# Patient Record
Sex: MALE | Race: BLACK OR AFRICAN AMERICAN | Employment: OTHER | URBAN - METROPOLITAN AREA
[De-identification: names, ages, dates, MRNs, and addresses within clinical notes are randomized per-mention and may not be internally consistent; named-entity substitution may affect disease eponyms.]

---

## 2017-01-04 ENCOUNTER — OFFICE VISIT (OUTPATIENT)
Dept: INTERNAL MEDICINE CLINIC | Age: 82
End: 2017-01-04

## 2017-01-04 VITALS
WEIGHT: 192 LBS | DIASTOLIC BLOOD PRESSURE: 80 MMHG | HEART RATE: 81 BPM | SYSTOLIC BLOOD PRESSURE: 148 MMHG | RESPIRATION RATE: 20 BRPM | TEMPERATURE: 98.4 F | HEIGHT: 70 IN | OXYGEN SATURATION: 97 % | BODY MASS INDEX: 27.49 KG/M2

## 2017-01-04 DIAGNOSIS — R80.9 CONTROLLED TYPE 2 DIABETES MELLITUS WITH MICROALBUMINURIA, WITHOUT LONG-TERM CURRENT USE OF INSULIN (HCC): Primary | ICD-10-CM

## 2017-01-04 DIAGNOSIS — E78.00 HIGH BLOOD CHOLESTEROL LEVEL: ICD-10-CM

## 2017-01-04 DIAGNOSIS — I10 HTN (HYPERTENSION), BENIGN: ICD-10-CM

## 2017-01-04 DIAGNOSIS — E11.29 CONTROLLED TYPE 2 DIABETES MELLITUS WITH MICROALBUMINURIA, WITHOUT LONG-TERM CURRENT USE OF INSULIN (HCC): Primary | ICD-10-CM

## 2017-01-04 RX ORDER — AMLODIPINE AND BENAZEPRIL HYDROCHLORIDE 10; 20 MG/1; MG/1
CAPSULE ORAL
Qty: 30 CAP | Refills: 5 | Status: SHIPPED | OUTPATIENT
Start: 2017-01-04 | End: 2018-01-01 | Stop reason: ALTCHOICE

## 2017-01-04 NOTE — PROGRESS NOTES
Patient is in the office today for a 4 month follow up. Do you have an Advance Directive yes      1. Have you been to the ER, urgent care clinic since your last visit? Hospitalized since your last visit? No    2. Have you seen or consulted any other health care providers outside of the [address] [place] [address] since your last visit? Include any pap smears or colon screening.  No

## 2017-01-04 NOTE — PROGRESS NOTES
Subjective:       Chief Complaint  The patient presents for follow up of diabetes, hypertension and high cholesterol. HPI Merilee Goodpasture is a [de-identified] y.o. male seen for follow up of diabetes. Too has hypertension and hyperlipidemia. Diabetes well controlled, no significant medication side effects noted, on metformin, hypertension well controlled, no significant medication side effects noted, on Lotrel, hyperlipidemia well controlled, no significant medication side effects noted, on Zocor. Diet and Lifestyle: generally follows a low fat low cholesterol diet, follows a diabetic diet regularly, sedentary    Home BP Monitoring: is not measured at home. Diabetic Review of Systems - home glucose monitoring: is performed regularly, 1x/day. Other symptoms and concerns:  Pt is by himself today. Has been concern by family about his memory but pt seems to still drive and take meds. Discussed the patient's BMI with him. The BMI follow up plan is as follows: BMI is out of normal parameters and plan is as follows: I have counseled this patient on diet and exercise regimens. Current Outpatient Prescriptions   Medication Sig    amLODIPine-benazepril (LOTREL) 10-20 mg per capsule take 1 capsule by mouth once daily    metFORMIN ER (GLUCOPHAGE XR) 500 mg tablet take 1 tablet by mouth once daily WITH DINNER    simvastatin (ZOCOR) 40 mg tablet take 1 tablet by mouth EVERYNIGHT AT BEDTIME    aspirin (ASPIRIN) 325 mg tablet Take 325 mg by mouth daily. No current facility-administered medications for this visit.               Review of Systems  Respiratory: negative for dyspnea on exertion  Cardiovascular: negative for chest pain    Objective:     Visit Vitals    /80 (BP 1 Location: Left arm, BP Patient Position: Sitting)    Pulse 81    Temp 98.4 °F (36.9 °C) (Oral)    Resp 20    Ht 5' 10\" (1.778 m)    Wt 192 lb (87.1 kg)    SpO2 97%    BMI 27.55 kg/m2        General appearance - alert, well appearing, and in no distress  Neck - supple, no significant adenopathy, carotids upstroke normal bilaterally, no bruits  Chest - clear to auscultation, no wheezes, rales or rhonchi, symmetric air entry  Heart - normal rate, regular rhythm, normal S1, S2, no murmurs, rubs, clicks or gallops  Extremities - peripheral pulses normal, no pedal edema, no clubbing or cyanosis        Labs:   Lab Results   Component Value Date/Time    Hemoglobin A1c 5.6 12/29/2016 08:16 AM    Hemoglobin A1c 5.8 08/18/2016 08:30 AM    Hemoglobin A1c 6.3 04/19/2016 08:11 AM    Microalbumin/Creat ratio (mg/g creat) 294 12/29/2016 08:16 AM    Microalb/Creat ratio (ug/mg creat.) 140.7 12/18/2014 08:32 AM    Microalbumin,urine random 60.30 12/29/2016 08:16 AM    LDL, calculated 69 12/29/2016 08:16 AM    Creatinine 1.17 12/29/2016 08:16 AM      Lab Results   Component Value Date/Time    Cholesterol, total 121 12/29/2016 08:16 AM    HDL Cholesterol 36 12/29/2016 08:16 AM    LDL, calculated 69 12/29/2016 08:16 AM    Triglyceride 80 12/29/2016 08:16 AM    CHOL/HDL Ratio 3.4 12/29/2016 08:16 AM     Lab Results   Component Value Date/Time    AST 12 12/29/2016 08:16 AM    Alk. phosphatase 88 12/29/2016 08:16 AM     Lab Results   Component Value Date/Time    GFR est AA >60 12/29/2016 08:16 AM    GFR est non-AA 60 12/29/2016 08:16 AM    Creatinine 1.17 12/29/2016 08:16 AM    BUN 17 12/29/2016 08:16 AM    Sodium 143 12/29/2016 08:16 AM    Potassium 4.2 12/29/2016 08:16 AM    Chloride 107 12/29/2016 08:16 AM    CO2 29 12/29/2016 08:16 AM            Assessment / Plan     Diabetes well controlled, on current meds,   Hypertension well controlled, on current meds  Hyperlipidemia well controlled, on ZOcor. ICD-10-CM ICD-9-CM    1.  Controlled type 2 diabetes mellitus with microalbuminuria, without long-term current use of insulin (HCC) E11.29 250.40 HEMOGLOBIN A1C W/O EAG    R80.9 791.0    2. HTN (hypertension), benign S41 974.6 METABOLIC PANEL, COMPREHENSIVE   3. High blood cholesterol level E78.00 272.0 LIPID PANEL                Diabetic issues reviewed with him: diabetic diet discussed in detail, and low cholesterol diet, weight control and daily exercise discussed. Follow-up Disposition:  Return in about 4 months (around 5/4/2017) for labs 1 week before. Reviewed plan of care. Patient has provided input and agrees with goals.

## 2017-01-04 NOTE — PATIENT INSTRUCTIONS

## 2017-01-04 NOTE — MR AVS SNAPSHOT
Visit Information Date & Time Provider Department Dept. Phone Encounter #  
 1/4/2017  4:30 PM Dorys Sims MD [facility] 8 [id number] Follow-up Instructions Return in about 4 months (around 5/4/2017) for labs 1 week before. Your Appointments 1/4/2017  4:30 PM  
ROUTINE CARE with Dorys Sims MD  
[facility] [employer] (--) Appt Note: 4 month follow with labs [address] [place] [address] [phone]  
  
   
 [address] [place] [address] [address] Upcoming Health Maintenance Date Due DTaP/Tdap/Td series (1 - Tdap) 8/18/1956 ZOSTER VACCINE AGE 60> 8/18/1996 GLAUCOMA SCREENING Q2Y 8/18/2001 EYE EXAM RETINAL OR DILATED Q1 8/23/2010 FOOT EXAM Q1 4/24/2016 INFLUENZA AGE 9 TO ADULT 8/1/2016 Pneumococcal 65+ High/Highest Risk (1 of 2 - PCV13) 1/30/2017* MEDICARE YEARLY EXAM 4/28/2017 HEMOGLOBIN A1C Q6M 6/29/2017 MICROALBUMIN Q1 12/29/2017 LIPID PANEL Q1 12/29/2017 *Topic was postponed. The date shown is not the original due date. Allergies as of 1/4/2017  Review Complete On: 1/4/2017 By: Dorys Sims MD  
 No Known Allergies Current Immunizations  Never Reviewed No immunizations on file. Not reviewed this visit You Were Diagnosed With   
  
 Codes Comments HTN (hypertension), benign    -  Primary ICD-10-CM: I10 
ICD-9-CM: 401.1 High blood cholesterol level     ICD-10-CM: E78.00 ICD-9-CM: 272.0 Controlled type 2 diabetes mellitus with microalbuminuria, without long-term current use of insulin (HCC)     ICD-10-CM: E11.29, R80.9 ICD-9-CM: 250.40, 791.0 Vitals BP Pulse Temp Resp Height(growth percentile) Weight(growth percentile) 148/80 (BP 1 Location: Left arm, BP Patient Position: Sitting) 81 98.4 °F (36.9 °C) (Oral) 20 5' 10\" (1.778 m) 192 lb (87.1 kg) SpO2 BMI Smoking Status 97% 27.55 kg/m2 Never Smoker Vitals History BMI and BSA Data Body Mass Index Body Surface Area  
 27.55 kg/m 2 2.07 m 2 [phone] Your Updated Medication List  
  
   
This list is accurate as of: 1/4/17  4:20 PM.  Always use your most recent med list. amLODIPine-benazepril 10-20 mg per capsule Commonly known as:  LOTREL  
take 1 capsule by mouth once daily  
  
 aspirin 325 mg tablet Commonly known as:  ASPIRIN Take 325 mg by mouth daily. metFORMIN  mg tablet Commonly known as:  GLUCOPHAGE XR  
take 1 tablet by mouth once daily WITH DINNER  
  
 simvastatin 40 mg tablet Commonly known as:  ZOCOR  
take 1 tablet by mouth EVERYNIGHT AT BEDTIME Prescriptions Sent to Pharmacy Refills  
 amLODIPine-benazepril (LOTREL) 10-20 mg per capsule 5 Sig: take 1 capsule by mouth once daily Class: Normal  
 Pharmacy: [id number]. Sergio [place]. Ph #: [phone] Follow-up Instructions Return in about 4 months (around 5/4/2017) for labs 1 week before. Patient Instructions Learning About Diabetes Food Guidelines Your Care Instructions Meal planning is important to manage diabetes. It helps keep your blood sugar at a target level (which you set with your doctor). You don't have to eat special foods. You can eat what your family eats, including sweets once in a while. But you do have to pay attention to how often you eat and how much you eat of certain foods. You may want to work with a dietitian or a certified diabetes educator (CDE) to help you plan meals and snacks. A dietitian or CDE can also help you lose weight if that is one of your goals. What should you know about eating carbs?  
Managing the amount of carbohydrate (carbs) you eat is an important part of healthy meals when you have diabetes. Carbohydrate is found in many foods. · Learn which foods have carbs. And learn the amounts of carbs in different foods. ¨ Bread, cereal, pasta, and rice have about 15 grams of carbs in a serving. A serving is 1 slice of bread (1 ounce), ½ cup of cooked cereal, or 1/3 cup of cooked pasta or rice. ¨ Fruits have 15 grams of carbs in a serving. A serving is 1 small fresh fruit, such as an apple or orange; ½ of a banana; ½ cup of cooked or canned fruit; ½ cup of fruit juice; 1 cup of melon or raspberries; or 2 tablespoons of dried fruit. ¨ Milk and no-sugar-added yogurt have 15 grams of carbs in a serving. A serving is 1 cup of milk or 2/3 cup of no-sugar-added yogurt. ¨ Starchy vegetables have 15 grams of carbs in a serving. A serving is ½ cup of mashed potatoes or sweet potato; 1 cup winter squash; ½ of a small baked potato; ½ cup of cooked beans; or ½ cup cooked corn or green peas. · Learn how much carbs to eat each day and at each meal. A dietitian or CDE can teach you how to keep track of the amount of carbs you eat. This is called carbohydrate counting. · If you are not sure how to count carbohydrate grams, use the Plate Method to plan meals. It is a good, quick way to make sure that you have a balanced meal. It also helps you spread carbs throughout the day. ¨ Divide your plate by types of foods. Put non-starchy vegetables on half the plate, meat or other protein food on one-quarter of the plate, and a grain or starchy vegetable in the final quarter of the plate. To this you can add a small piece of fruit and 1 cup of milk or yogurt, depending on how many carbs you are supposed to eat at a meal. 
· Try to eat about the same amount of carbs at each meal. Do not \"save up\" your daily allowance of carbs to eat at one meal. 
· Proteins have very little or no carbs per serving.  Examples of proteins are beef, chicken, turkey, fish, eggs, tofu, cheese, cottage cheese, and peanut butter. A serving size of meat is 3 ounces, which is about the size of a deck of cards. Examples of meat substitute serving sizes (equal to 1 ounce of meat) are 1/4 cup of cottage cheese, 1 egg, 1 tablespoon of peanut butter, and ½ cup of tofu. How can you eat out and still eat healthy? · Learn to estimate the serving sizes of foods that have carbohydrate. If you measure food at home, it will be easier to estimate the amount in a serving of restaurant food. · If the meal you order has too much carbohydrate (such as potatoes, corn, or baked beans), ask to have a low-carbohydrate food instead. Ask for a salad or green vegetables. · If you use insulin, check your blood sugar before and after eating out to help you plan how much to eat in the future. · If you eat more carbohydrate at a meal than you had planned, take a walk or do other exercise. This will help lower your blood sugar. What else should you know? · Limit saturated fat, such as the fat from meat and dairy products. This is a healthy choice because people who have diabetes are at higher risk of heart disease. So choose lean cuts of meat and nonfat or low-fat dairy products. Use olive or canola oil instead of butter or shortening when cooking. · Don't skip meals. Your blood sugar may drop too low if you skip meals and take insulin or certain medicines for diabetes. · Check with your doctor before you drink alcohol. Alcohol can cause your blood sugar to drop too low. Alcohol can also cause a bad reaction if you take certain diabetes medicines. Follow-up care is a key part of your treatment and safety. Be sure to make and go to all appointments, and call your doctor if you are having problems. It's also a good idea to know your test results and keep a list of the medicines you take. Where can you learn more? Go to http://gladis-bigg.info/.  
Enter A080 in the search box to learn more about \"Learning About Diabetes Food Guidelines. \" Current as of: May 23, 2016 Content Version: 11.1 © 3272-7684 [employer]. Care instructions adapted under license by [employer] (which disclaims liability or warranty for this information). If you have questions about a medical condition or this instruction, always ask your healthcare professional. [address] any warranty or liability for your use of this information. Introducing [facility]! [employer] introduces [employer] patient portal. Now you can access parts of your medical record, email your doctor's office, and request medication refills online. 1. In your internet browser, go to [email or web address] 2. Click on the First Time User? Click Here link in the Sign In box. You will see the New Member Sign Up page. 3. Enter your [employer] Access Code exactly as it appears below. You will not need to use this code after youve completed the sign-up process. If you do not sign up before the expiration date, you must request a new code. · [employer] Access Code: [email or web address] Expires: 4/4/2017  4:14 PM 
 
4. Enter the last four digits of your Social Security Number (xxxx) and Date of Birth (mm/dd/yyyy) as indicated and click Submit. You will be taken to the next sign-up page. 5. Create a [employer] ID. This will be your [employer] login ID and cannot be changed, so think of one that is secure and easy to remember. 6. Create a [employer] password. You can change your password at any time. 7. Enter your Password Reset Question and Answer. This can be used at a later time if you forget your password. 8. Enter your e-mail address. You will receive e-mail notification when new information is available in [address] 9. Click Sign Up. You can now view and download portions of your medical record. 10. Click the Download Summary menu link to download a portable copy of your medical information. If you have questions, please visit the Frequently Asked Questions section of the [facility] website. Remember, [employer] is NOT to be used for urgent needs. For medical emergencies, dial 911. Now available from your iPhone and Android! Please provide this summary of care documentation to your next provider. Your primary care clinician is listed as JOSE GUEVARA. If you have any questions after today's visit, please call [phone].

## 2017-01-23 RX ORDER — METFORMIN HYDROCHLORIDE 500 MG/1
TABLET, EXTENDED RELEASE ORAL
Qty: 30 TAB | Refills: 5 | Status: SHIPPED | OUTPATIENT
Start: 2017-01-23 | End: 2018-01-01

## 2017-04-28 ENCOUNTER — HOSPITAL ENCOUNTER (OUTPATIENT)
Dept: LAB | Age: 82
Discharge: HOME | End: 2017-04-28
Payer: MEDICARE

## 2017-04-28 DIAGNOSIS — E11.29 CONTROLLED TYPE 2 DIABETES MELLITUS WITH MICROALBUMINURIA, WITHOUT LONG-TERM CURRENT USE OF INSULIN (HCC): ICD-10-CM

## 2017-04-28 DIAGNOSIS — R80.9 CONTROLLED TYPE 2 DIABETES MELLITUS WITH MICROALBUMINURIA, WITHOUT LONG-TERM CURRENT USE OF INSULIN (HCC): ICD-10-CM

## 2017-04-28 DIAGNOSIS — E78.00 HIGH BLOOD CHOLESTEROL LEVEL: ICD-10-CM

## 2017-04-28 DIAGNOSIS — I10 HTN (HYPERTENSION), BENIGN: ICD-10-CM

## 2017-04-28 LAB
ALBUMIN SERPL BCP-MCNC: 3.3 G/DL (ref 3.4–5)
ALBUMIN/GLOB SERPL: 0.6 {RATIO} (ref 0.8–1.7)
ALP SERPL-CCNC: 78 U/L (ref 45–117)
ALT SERPL-CCNC: 15 U/L (ref 16–61)
ANION GAP BLD CALC-SCNC: 11 MMOL/L (ref 3–18)
AST SERPL W P-5'-P-CCNC: 15 U/L (ref 15–37)
BILIRUB SERPL-MCNC: 0.4 MG/DL (ref 0.2–1)
BUN SERPL-MCNC: 12 MG/DL (ref 7–18)
BUN/CREAT SERPL: 11 (ref 12–20)
CALCIUM SERPL-MCNC: 9.3 MG/DL (ref 8.5–10.1)
CHLORIDE SERPL-SCNC: 103 MMOL/L (ref 100–108)
CHOLEST SERPL-MCNC: 107 MG/DL
CO2 SERPL-SCNC: 27 MMOL/L (ref 21–32)
CREAT SERPL-MCNC: 1.08 MG/DL (ref 0.6–1.3)
GLOBULIN SER CALC-MCNC: 5.6 G/DL (ref 2–4)
GLUCOSE SERPL-MCNC: 94 MG/DL (ref 74–99)
HBA1C MFR BLD: 6 % (ref 4.2–5.6)
HDLC SERPL-MCNC: 30 MG/DL (ref 40–60)
HDLC SERPL: 3.6 {RATIO} (ref 0–5)
LDLC SERPL CALC-MCNC: 56.4 MG/DL (ref 0–100)
LIPID PROFILE,FLP: ABNORMAL
POTASSIUM SERPL-SCNC: 4.3 MMOL/L (ref 3.5–5.5)
PROT SERPL-MCNC: 8.9 G/DL (ref 6.4–8.2)
SODIUM SERPL-SCNC: 141 MMOL/L (ref 136–145)
TRIGL SERPL-MCNC: 103 MG/DL (ref ?–150)
VLDLC SERPL CALC-MCNC: 20.6 MG/DL

## 2017-04-28 PROCEDURE — 80053 COMPREHEN METABOLIC PANEL: CPT | Performed by: INTERNAL MEDICINE

## 2017-04-28 PROCEDURE — 80061 LIPID PANEL: CPT | Performed by: INTERNAL MEDICINE

## 2017-04-28 PROCEDURE — 36415 COLL VENOUS BLD VENIPUNCTURE: CPT | Performed by: INTERNAL MEDICINE

## 2017-04-28 PROCEDURE — 83036 HEMOGLOBIN GLYCOSYLATED A1C: CPT | Performed by: INTERNAL MEDICINE

## 2017-05-15 ENCOUNTER — OFFICE VISIT (OUTPATIENT)
Dept: INTERNAL MEDICINE CLINIC | Age: 82
End: 2017-05-15

## 2017-05-15 VITALS
OXYGEN SATURATION: 98 % | SYSTOLIC BLOOD PRESSURE: 134 MMHG | RESPIRATION RATE: 16 BRPM | TEMPERATURE: 98.1 F | HEART RATE: 71 BPM | HEIGHT: 70 IN | WEIGHT: 181 LBS | BODY MASS INDEX: 25.91 KG/M2 | DIASTOLIC BLOOD PRESSURE: 80 MMHG

## 2017-05-15 DIAGNOSIS — E11.29 CONTROLLED TYPE 2 DIABETES MELLITUS WITH MICROALBUMINURIA, WITHOUT LONG-TERM CURRENT USE OF INSULIN (HCC): ICD-10-CM

## 2017-05-15 DIAGNOSIS — I10 HTN (HYPERTENSION), BENIGN: ICD-10-CM

## 2017-05-15 DIAGNOSIS — Z13.39 SCREENING FOR ALCOHOLISM: ICD-10-CM

## 2017-05-15 DIAGNOSIS — R63.4 WEIGHT LOSS: ICD-10-CM

## 2017-05-15 DIAGNOSIS — Z00.00 ROUTINE GENERAL MEDICAL EXAMINATION AT A HEALTH CARE FACILITY: Primary | ICD-10-CM

## 2017-05-15 DIAGNOSIS — R80.9 CONTROLLED TYPE 2 DIABETES MELLITUS WITH MICROALBUMINURIA, WITHOUT LONG-TERM CURRENT USE OF INSULIN (HCC): ICD-10-CM

## 2017-05-15 DIAGNOSIS — E78.00 HIGH BLOOD CHOLESTEROL LEVEL: ICD-10-CM

## 2017-05-15 NOTE — MR AVS SNAPSHOT
Visit Information Date & Time Provider Department Dept. Phone Encounter #  
 5/15/2017  3:15 PM Yari Felder MD [facility] 8 [id number] Follow-up Instructions Return in about 3 months (around 8/15/2017) for labs 1 week before. Upcoming Health Maintenance Date Due DTaP/Tdap/Td series (1 - Tdap) 8/18/1956 ZOSTER VACCINE AGE 60> 8/18/1996 GLAUCOMA SCREENING Q2Y 8/18/2001 Pneumococcal 65+ Low/Medium Risk (1 of 2 - PCV13) 8/18/2001 EYE EXAM RETINAL OR DILATED Q1 8/23/2010 FOOT EXAM Q1 4/24/2016 MEDICARE YEARLY EXAM 4/28/2017 INFLUENZA AGE 9 TO ADULT 8/1/2017 HEMOGLOBIN A1C Q6M 10/28/2017 MICROALBUMIN Q1 12/29/2017 LIPID PANEL Q1 4/28/2018 Allergies as of 5/15/2017  Review Complete On: 5/15/2017 By: Yari Felder MD  
 No Known Allergies Current Immunizations  Never Reviewed No immunizations on file. Not reviewed this visit You Were Diagnosed With   
  
 Codes Comments Routine general medical examination at a health care facility     ICD-10-CM: Z00.00 ICD-9-CM: V70.0 Screening for alcoholism     ICD-10-CM: Z13.89 ICD-9-CM: V79.1 Vitals BP Pulse Temp Resp Height(growth percentile) Weight(growth percentile) 134/80 (BP 1 Location: Left arm, BP Patient Position: Sitting) 71 98.1 °F (36.7 °C) (Oral) 16 5' 10\" (1.778 m) 181 lb (82.1 kg) SpO2 BMI Smoking Status 98% 25.97 kg/m2 Never Smoker Vitals History BMI and BSA Data Body Mass Index Body Surface Area  
 25.97 kg/m 2 2.01 m 2 Preferred Pharmacy Pharmacy Name Phone [facility]. [place]a Croft, [address] [facility] [phone] Your Updated Medication List  
  
   
This list is accurate as of: 5/15/17  4:28 PM.  Always use your most recent med list. amLODIPine-benazepril 10-20 mg per capsule Commonly known as:  LOTREL  
take 1 capsule by mouth once daily aspirin 325 mg tablet Commonly known as:  ASPIRIN Take 325 mg by mouth daily. metFORMIN  mg tablet Commonly known as:  GLUCOPHAGE XR  
take 1 tablet by mouth once daily WITH DINNER  
  
 simvastatin 40 mg tablet Commonly known as:  ZOCOR  
take 1 tablet by mouth EVERYNIGHT AT BEDTIME Follow-up Instructions Return in about 3 months (around 8/15/2017) for labs 1 week before. Patient Instructions Medicare Part B Preventive Services Limitations Recommendation Scheduled Bone Mass Measurement 
(age 72 & older, biennial) Requires diagnosis related to osteoporosis or estrogen deficiency. Biennial benefit unless patient has history of long-term glucocorticoid tx or baseline is needed because initial test was by other method  NA Cardiovascular Screening Blood Tests (every 5 years) Total cholesterol, HDL, Triglycerides Order as a panel if possible  4/2017 Colorectal Cancer Screening 
-Fecal occult blood test (annual) -Flexible sigmoidoscopy (5y) 
-Screening colonoscopy (10y) -Barium Enema   NA Counseling to Prevent Tobacco Use (up to 8 sessions per year) - Counseling greater than 3 and up to 10 minutes - Counseling greater than 10 minutes Patients must be asymptomatic of tobacco-related conditions to receive as preventive service  NA Diabetes Screening Tests (at least every 3 years, Medicare covers annually or at 6-month intervals for prediabetic patients) Fasting blood sugar (FBS) or glucose tolerance test (GTT) Patient must be diagnosed with one of the following: 
-Hypertension, Dyslipidemia, obesity, previous impaired FBS or GTT 
Or any two of the following: overweight, FH of diabetes, age ? 72, history of gestational diabetes, birth of baby weighing more than 9 pounds  4/2017 Diabetes Self-Management Training (DSMT) (no USPSTF recommendation) Requires referral by treating physician for patient with diabetes or renal disease. 10 hours of initial DSMT session of no less than 30 minutes each in a continuous 12-month period. 2 hours of follow-up DSMT in subsequent years. 8/2015 Glaucoma Screening (no USPSTF recommendation) Diabetes mellitus, family history, , age 48 or over,  American, age 72 or over  NA Human Immunodeficiency Virus (HIV) Screening (annually for increased risk patients) HIV-1 and HIV-2 by EIA, NORM, rapid antibody test, or oral mucosa transudate Patient must be at increased risk for HIV infection per USPSTF guidelines or pregnant. Tests covered annually for patients at increased risk. Pregnant patients may receive up to 3 test during pregnancy. NA Medical Nutrition Therapy (MNT) (for diabetes or renal disease not recommended schedule) Requires referral by treating physician for patient with diabetes or renal disease. Can be provided in same year as diabetes self-management training (DSMT), and CMS recommends medical nutrition therapy take place after DSMT. Up to 3 hours for initial year and 2 hours in subsequent years. NA Prostate Cancer Screening (annually up to age 76) - Digital rectal exam (ROBYN) - Prostate specific antigen (PSA) Annually (age 48 or over), ROBYN not paid separately when covered E/M service is provided on same date Men up to age 76 may need a screening blood test for prostate cancer at certain intervals, depending on their personal and family history. This decision is between the patient and his provider. NA Seasonal Influenza Vaccination (annually)   Declined Pneumococcal Vaccination (once after 65)   Declined Hepatitis B Vaccinations (if medium/high risk) Medium/high risk factors:  End-stage renal disease, Hemophiliacs who received Factor VIII or IX concentrates, Clients of institutions for the mentally retarded, Persons who live in the same house as a HepB virus carrier, Homosexual men, Illicit injectable drug abusers.   NA  
 Shingles Vaccination A shingles vaccine is also recommended once in a lifetime after age 61  Declined Ultrasound Screening for Abdominal Aortic Aneurysm (AAA) (once) Patient must be referred through [facility] and not have had a screening for abdominal aortic aneurysm before under Medicare. Limited to patients who meet one of the following criteria: 
- Men who are 73-68 years old and have smoked more than 100 cigarettes in their lifetime. 
-Anyone with a FH of AAA 
-Anyone recommended for screening by USPSTF  Na Preventing Falls: Care Instructions Your Care Instructions Getting around your home safely can be a challenge if you have injuries or health problems that make it easy for you to fall. Loose rugs and furniture in walkways are among the dangers for many older people who have problems walking or who have poor eyesight. People who have conditions such as arthritis, osteoporosis, or dementia also have to be careful not to fall. You can make your home safer with a few simple measures. Follow-up care is a key part of your treatment and safety. Be sure to make and go to all appointments, and call your doctor if you are having problems. It's also a good idea to know your test results and keep a list of the medicines you take. How can you care for yourself at home? Taking care of yourself · You may get dizzy if you do not drink enough water. To prevent dehydration, drink plenty of fluids, enough so that your urine is light yellow or clear like water. Choose water and other caffeine-free clear liquids. If you have kidney, heart, or liver disease and have to limit fluids, talk with your doctor before you increase the amount of fluids you drink. · Exercise regularly to improve your strength, muscle tone, and balance. Walk if you can. Swimming may be a good choice if you cannot walk easily.  
· Have your vision and hearing checked each year or any time you notice a change. If you have trouble seeing and hearing, you might not be able to avoid objects and could lose your balance. · Know the side effects of the medicines you take. Ask your doctor or pharmacist whether the medicines you take can affect your balance. Sleeping pills or sedatives can affect your balance. · Limit the amount of alcohol you drink. Alcohol can impair your balance and other senses. · Ask your doctor whether calluses or corns on your feet need to be removed. If you wear loose-fitting shoes because of calluses or corns, you can lose your balance and fall. · Talk to your doctor if you have numbness in your feet. Preventing falls at home · Remove raised doorway thresholds, throw rugs, and clutter. Repair loose carpet or raised areas in the floor. · Move furniture and electrical cords to keep them out of walking paths. · Use nonskid floor wax, and wipe up spills right away, especially on ceramic tile floors. · If you use a walker or cane, put rubber tips on it. If you use crutches, clean the bottoms of them regularly with an abrasive pad, such as steel wool. · Keep your house well lit, especially Katcarina Connelly, and outside walkways. Use night-lights in areas such as hallways and bathrooms. Add extra light switches or use remote switches (such as switches that go on or off when you clap your hands) to make it easier to turn lights on if you have to get up during the night. · Install sturdy handrails on stairways. · Move items in your cabinets so that the things you use a lot are on the lower shelves (about waist level). · Keep a cordless phone and a flashlight with new batteries by your bed. If possible, put a phone in each of the main rooms of your house, or carry a cell phone in case you fall and cannot reach a phone. Or, you can wear a device around your neck or wrist. You push a button that sends a signal for help. · Wear low-heeled shoes that fit well and give your feet good support. Use footwear with nonskid soles. Check the heels and soles of your shoes for wear. Repair or replace worn heels or soles. · Do not wear socks without shoes on wood floors. · Walk on the grass when the sidewalks are slippery. If you live in an area that gets snow and ice in the winter, sprinkle salt on slippery steps and sidewalks. Preventing falls in the bath · Install grab bars and nonskid mats inside and outside your shower or tub and near the toilet and sinks. · Use shower chairs and bath benches. · Use a hand-held shower head that will allow you to sit while showering. · Get into a tub or shower by putting the weaker leg in first. Get out of a tub or shower with your strong side first. 
· Repair loose toilet seats and consider installing a raised toilet seat to make getting on and off the toilet easier. · Keep your bathroom door unlocked while you are in the shower. Where can you learn more? Go to http://gladis-bigg.info/. Enter [id number] [id number] in the search box to learn more about \"Preventing Falls: Care Instructions. \" Current as of: August 4, 2016 Content Version: 11.2 © 7385-3160 [employer]. Care instructions adapted under license by [employer] (which disclaims liability or warranty for this information). If you have questions about a medical condition or this instruction, always ask your healthcare professional. [place] [address] any warranty or liability for your use of this information. Introducing [facility]! Bethelmitoney Shah introduces [employer] patient portal. Now you can access parts of your medical record, email your doctor's office, and request medication refills online. 1. In your internet browser, go to [email or web address] 2. Click on the First Time User? Click Here link in the Sign In box. You will see the New Member Sign Up page. 3. Enter your [employer] Access Code exactly as it appears below. You will not need to use this code after youve completed the sign-up process. If you do not sign up before the expiration date, you must request a new code. · [employer] Access Code: [email or web address] Expires: 8/13/2017  2:19 PM 
 
4. Enter the last four digits of your Social Security Number (xxxx) and Date of Birth (mm/dd/yyyy) as indicated and click Submit. You will be taken to the next sign-up page. 5. Create a [employer] ID. This will be your [employer] login ID and cannot be changed, so think of one that is secure and easy to remember. 6. Create a [employer] password. You can change your password at any time. 7. Enter your Password Reset Question and Answer. This can be used at a later time if you forget your password. 8. Enter your e-mail address. You will receive e-mail notification when new information is available in [address]. 9. Click Sign Up. You can now view and download portions of your medical record. 10. Click the Download Summary menu link to download a portable copy of your medical information. If you have questions, please visit the Frequently Asked Questions section of the [employer] website. Remember, [employer] is NOT to be used for urgent needs. For medical emergencies, dial 911. Now available from your iPhone and Android! Please provide this summary of care documentation to your next provider. Your primary care clinician is listed as JOSE GUEVARA. If you have any questions after today's visit, please call [phone].

## 2017-05-15 NOTE — PATIENT INSTRUCTIONS
Medicare Part B Preventive Services Limitations Recommendation Scheduled   Bone Mass Measurement  (age 72 & older, biennial) Requires diagnosis related to osteoporosis or estrogen deficiency. Biennial benefit unless patient has history of long-term glucocorticoid tx or baseline is needed because initial test was by other method  NA   Cardiovascular Screening Blood Tests (every 5 years)  Total cholesterol, HDL, Triglycerides Order as a panel if possible  4/2017   Colorectal Cancer Screening  -Fecal occult blood test (annual)  -Flexible sigmoidoscopy (5y)  -Screening colonoscopy (10y)  -Barium Enema   NA   Counseling to Prevent Tobacco Use (up to 8 sessions per year)  - Counseling greater than 3 and up to 10 minutes  - Counseling greater than 10 minutes Patients must be asymptomatic of tobacco-related conditions to receive as preventive service  NA   Diabetes Screening Tests (at least every 3 years, Medicare covers annually or at 6-month intervals for prediabetic patients)    Fasting blood sugar (FBS) or glucose tolerance test (GTT) Patient must be diagnosed with one of the following:  -Hypertension, Dyslipidemia, obesity, previous impaired FBS or GTT  Or any two of the following: overweight, FH of diabetes, age ? 72, history of gestational diabetes, birth of baby weighing more than 9 pounds  4/2017   Diabetes Self-Management Training (DSMT) (no USPSTF recommendation) Requires referral by treating physician for patient with diabetes or renal disease. 10 hours of initial DSMT session of no less than 30 minutes each in a continuous 12-month period. 2 hours of follow-up DSMT in subsequent years.   8/2015   Glaucoma Screening (no USPSTF recommendation) Diabetes mellitus, family history, , age 48 or over,  American, age 72 or over  NA   Human Immunodeficiency Virus (HIV) Screening (annually for increased risk patients)  HIV-1 and HIV-2 by EIA, NORM, rapid antibody test, or oral mucosa transudate Patient must be at increased risk for HIV infection per USPSTF guidelines or pregnant. Tests covered annually for patients at increased risk. Pregnant patients may receive up to 3 test during pregnancy. NA   Medical Nutrition Therapy (MNT) (for diabetes or renal disease not recommended schedule) Requires referral by treating physician for patient with diabetes or renal disease. Can be provided in same year as diabetes self-management training (DSMT), and CMS recommends medical nutrition therapy take place after DSMT. Up to 3 hours for initial year and 2 hours in subsequent years. NA   Prostate Cancer Screening (annually up to age 76)  - Digital rectal exam (ROBYN)  - Prostate specific antigen (PSA) Annually (age 48 or over), ROBYN not paid separately when covered E/M service is provided on same date  Men up to age 76 may need a screening blood test for prostate cancer at certain intervals, depending on their personal and family history. This decision is between the patient and his provider. NA   Seasonal Influenza Vaccination (annually)   Declined      Pneumococcal Vaccination (once after 72)   Declined   Hepatitis B Vaccinations (if medium/high risk) Medium/high risk factors:  End-stage renal disease,  Hemophiliacs who received Factor VIII or IX concentrates, Clients of institutions for the mentally retarded, Persons who live in the same house as a HepB virus carrier, Homosexual men, Illicit injectable drug abusers. NA   Shingles Vaccination A shingles vaccine is also recommended once in a lifetime after age 61  Declined    Ultrasound Screening for Abdominal Aortic Aneurysm (AAA) (once) Patient must be referred through [facility] and not have had a screening for abdominal aortic aneurysm before under Medicare.   Limited to patients who meet one of the following criteria:  - Men who are 73-68 years old and have smoked more than 100 cigarettes in their lifetime.  -Anyone with a FH of AAA  -Anyone recommended for screening by USPSTF  Na          Preventing Falls: Care Instructions  Your Care Instructions  Getting around your home safely can be a challenge if you have injuries or health problems that make it easy for you to fall. Loose rugs and furniture in walkways are among the dangers for many older people who have problems walking or who have poor eyesight. People who have conditions such as arthritis, osteoporosis, or dementia also have to be careful not to fall. You can make your home safer with a few simple measures. Follow-up care is a key part of your treatment and safety. Be sure to make and go to all appointments, and call your doctor if you are having problems. It's also a good idea to know your test results and keep a list of the medicines you take. How can you care for yourself at home? Taking care of yourself  · You may get dizzy if you do not drink enough water. To prevent dehydration, drink plenty of fluids, enough so that your urine is light yellow or clear like water. Choose water and other caffeine-free clear liquids. If you have kidney, heart, or liver disease and have to limit fluids, talk with your doctor before you increase the amount of fluids you drink. · Exercise regularly to improve your strength, muscle tone, and balance. Walk if you can. Swimming may be a good choice if you cannot walk easily. · Have your vision and hearing checked each year or any time you notice a change. If you have trouble seeing and hearing, you might not be able to avoid objects and could lose your balance. · Know the side effects of the medicines you take. Ask your doctor or pharmacist whether the medicines you take can affect your balance. Sleeping pills or sedatives can affect your balance. · Limit the amount of alcohol you drink. Alcohol can impair your balance and other senses. · Ask your doctor whether calluses or corns on your feet need to be removed.  If you wear loose-fitting shoes because of calluses or corns, you can lose your balance and fall. · Talk to your doctor if you have numbness in your feet. Preventing falls at home  · Remove raised doorway thresholds, throw rugs, and clutter. Repair loose carpet or raised areas in the floor. · Move furniture and electrical cords to keep them out of walking paths. · Use nonskid floor wax, and wipe up spills right away, especially on ceramic tile floors. · If you use a walker or cane, put rubber tips on it. If you use crutches, clean the bottoms of them regularly with an abrasive pad, such as steel wool. · Keep your house well lit, especially Delgado Basil, and outside walkways. Use night-lights in areas such as hallways and bathrooms. Add extra light switches or use remote switches (such as switches that go on or off when you clap your hands) to make it easier to turn lights on if you have to get up during the night. · Install sturdy handrails on stairways. · Move items in your cabinets so that the things you use a lot are on the lower shelves (about waist level). · Keep a cordless phone and a flashlight with new batteries by your bed. If possible, put a phone in each of the main rooms of your house, or carry a cell phone in case you fall and cannot reach a phone. Or, you can wear a device around your neck or wrist. You push a button that sends a signal for help. · Wear low-heeled shoes that fit well and give your feet good support. Use footwear with nonskid soles. Check the heels and soles of your shoes for wear. Repair or replace worn heels or soles. · Do not wear socks without shoes on wood floors. · Walk on the grass when the sidewalks are slippery. If you live in an area that gets snow and ice in the winter, sprinkle salt on slippery steps and sidewalks. Preventing falls in the bath  · Install grab bars and nonskid mats inside and outside your shower or tub and near the toilet and sinks. · Use shower chairs and bath benches.   · Use a hand-held shower head that will allow you to sit while showering. · Get into a tub or shower by putting the weaker leg in first. Get out of a tub or shower with your strong side first.  · Repair loose toilet seats and consider installing a raised toilet seat to make getting on and off the toilet easier. · Keep your bathroom door unlocked while you are in the shower. Where can you learn more? Go to http://gladis-bigg.info/. Enter [phone] [id number] in the search box to learn more about \"Preventing Falls: Care Instructions. \"  Current as of: August 4, 2016  Content Version: 11.2  © 0387-2585 [employer]. Care instructions adapted under license by [employer] (which disclaims liability or warranty for this information). If you have questions about a medical condition or this instruction, always ask your healthcare professional. [address] any warranty or liability for your use of this information.

## 2017-05-15 NOTE — PROGRESS NOTES
Patient is in the office today for a 6 month follow up, and Medicare Wellness Visit. 1. Have you been to the ER, urgent care clinic since your last visit? Hospitalized since your last visit? No    2. Have you seen or consulted any other health care providers outside of the [address] [place] [address] since your last visit? Include any pap smears or colon screening. No        This is a Subsequent Medicare Annual Wellness Visit providing Personalized Prevention Plan Services (PPPS) (Performed 12 months after initial AWV and PPPS )    I have reviewed the patient's medical history in detail and updated the computerized patient record. History     Past Medical History:   Diagnosis Date    CKD (chronic kidney disease) stage 2, GFR 60-89 ml/min 3/29/2010    High blood cholesterol level 3/29/2010    HTN (hypertension), benign 3/29/2010    Hyperglycemia 3/29/2010    Microalbuminuria 12/14/2010    Vertigo 3/29/2010      History reviewed. No pertinent surgical history. Current Outpatient Prescriptions   Medication Sig Dispense Refill    metFORMIN ER (GLUCOPHAGE XR) 500 mg tablet take 1 tablet by mouth once daily WITH DINNER 30 Tab 5    amLODIPine-benazepril (LOTREL) 10-20 mg per capsule take 1 capsule by mouth once daily 30 Cap 5    simvastatin (ZOCOR) 40 mg tablet take 1 tablet by mouth EVERYNIGHT AT BEDTIME 30 Tab 5    aspirin (ASPIRIN) 325 mg tablet Take 325 mg by mouth daily. No Known Allergies  History reviewed. No pertinent family history.   Social History   Substance Use Topics    Smoking status: Never Smoker    Smokeless tobacco: Never Used    Alcohol use No     Patient Active Problem List   Diagnosis Code    HTN (hypertension), benign I10    CKD (chronic kidney disease) stage 2, GFR 60-89 ml/min N18.2    Vertigo R42    High blood cholesterol level E78.00    Microalbuminuria R80.9    ACP (advance care planning) Z71.89    Controlled type 2 diabetes mellitus with microalbuminuria, without long-term current use of insulin (HCC) E11.29, R80.9       Depression Risk Factor Screening:     PHQ over the last two weeks 5/15/2017   Little interest or pleasure in doing things Not at all   Feeling down, depressed or hopeless Not at all   Total Score PHQ 2 0     Alcohol Risk Factor Screening:   Patient states he does not drink alcohol. Functional Ability and Level of Safety:     Hearing Loss   Patient states he might have a little hearing loss. Activities of Daily Living   Self-care. Requires assistance with: no ADLs    Fall Risk     Fall Risk Assessment, last 12 mths 5/15/2017   Able to walk? Yes   Fall in past 12 months? No   Fall with injury? -   Number of falls in past 12 months -   Fall Risk Score -     Abuse Screen   Patient is not abused    Review of Systems   A comprehensive review of systems was negative except for that written in the HPI. Physical Examination     Evaluation of Cognitive Function:  Mood/affect:  neutral  Appearance: age appropriate  Family member/caregiver input: none    Visit Vitals    /80 (BP 1 Location: Left arm, BP Patient Position: Sitting)    Pulse 71    Temp 98.1 °F (36.7 °C) (Oral)    Resp 16    Ht 5' 10\" (1.778 m)    Wt 181 lb (82.1 kg)    SpO2 98%    BMI 25.97 kg/m2       Patient Care Team:  Mariano Deshpande MD as PCP - General    Advice/Referrals/Counseling   Education and counseling provided:  Are appropriate based on today's review and evaluation  End-of-Life planning (with patient's consent)  Pneumococcal Vaccine     Glaucoma Screening- has not seen one in years declines currently  Pneumonia Vaccine- declines    Shingles Vaccine- declines    Tdap Vaccine- declines  Colonoscopy-  Due 2/2015. Pt declines currently    PSA- 6/2013  4.0  Due to age would not repeat    Advance Directive-  Pt considering wife and child       Assessment/Plan       ICD-10-CM ICD-9-CM    1.  Routine general medical examination at a health care facility Z00.00 V70.0 2. Screening for alcoholism Z13.89 V79.1    3. HTN (hypertension), benign Y40 361.7 METABOLIC PANEL, COMPREHENSIVE   4. High blood cholesterol level E78.00 272.0 LIPID PANEL   5. Controlled type 2 diabetes mellitus with microalbuminuria, without long-term current use of insulin (HCC) E11.29 250.40 HEMOGLOBIN A1C W/O EAG    R80.9 791.0    6. Weight loss R63.4 783.21    . A comprehensive 5 year plan for medical care and screening exams was reviewed with pt and they received a copy of it.

## 2017-05-16 NOTE — PROGRESS NOTES
Subjective:       Chief Complaint  The patient presents for follow up of diabetes, hypertension and high cholesterol. HPI  Trupti Erwin is a [de-identified] y.o. male seen for follow up of diabetes. Too has hypertension and hyperlipidemia. Diabetes well controlled, no significant medication side effects noted, on metformin, hypertension well controlled, no significant medication side effects noted, on Lotrel, hyperlipidemia well controlled, no significant medication side effects noted, on Zocor. Diet and Lifestyle: generally follows a low fat low cholesterol diet, follows a diabetic diet regularly, sedentary    Home BP Monitoring: is not measured at home. Diabetic Review of Systems - home glucose monitoring: is performed regularly, 1x/day. Other symptoms and concerns:  Pt is by himself today. Has been concern by family about his memory but pt seems to still drive and take meds. Pt has been losing weight over the last few months. He says he does eat less. Will continue to monitor if pt continues to lose weight consider further evaluation. Pt has declined screening recently due to his age. Discussed the patient's BMI with him. The BMI follow up plan is as follows: BMI is out of normal parameters and plan is as follows: I have counseled this patient on diet and exercise regimens. Current Outpatient Prescriptions   Medication Sig    metFORMIN ER (GLUCOPHAGE XR) 500 mg tablet take 1 tablet by mouth once daily WITH DINNER    amLODIPine-benazepril (LOTREL) 10-20 mg per capsule take 1 capsule by mouth once daily    simvastatin (ZOCOR) 40 mg tablet take 1 tablet by mouth EVERYNIGHT AT BEDTIME    aspirin (ASPIRIN) 325 mg tablet Take 325 mg by mouth daily. No current facility-administered medications for this visit.               Review of Systems  Respiratory: negative for dyspnea on exertion  Cardiovascular: negative for chest pain    Objective:     Visit Vitals    /80 (BP 1 Location: Left arm, BP Patient Position: Sitting)    Pulse 71    Temp 98.1 °F (36.7 °C) (Oral)    Resp 16    Ht 5' 10\" (1.778 m)    Wt 181 lb (82.1 kg)    SpO2 98%    BMI 25.97 kg/m2        General appearance - alert, well appearing, and in no distress  Neck - supple, no significant adenopathy, carotids upstroke normal bilaterally, no bruits  Chest - clear to auscultation, no wheezes, rales or rhonchi, symmetric air entry  Heart - normal rate, regular rhythm, normal S1, S2, no murmurs, rubs, clicks or gallops  Extremities - peripheral pulses normal, no pedal edema, no clubbing or cyanosis        Labs:   Lab Results   Component Value Date/Time    Hemoglobin A1c 6.0 04/28/2017 07:57 AM    Hemoglobin A1c 5.6 12/29/2016 08:16 AM    Hemoglobin A1c 5.8 08/18/2016 08:30 AM    Microalbumin/Creat ratio (mg/g creat) 294 12/29/2016 08:16 AM    Microalbumin,urine random 60.30 12/29/2016 08:16 AM    LDL, calculated 56.4 04/28/2017 07:57 AM    Creatinine 1.08 04/28/2017 07:57 AM      Lab Results   Component Value Date/Time    Cholesterol, total 107 04/28/2017 07:57 AM    HDL Cholesterol 30 04/28/2017 07:57 AM    LDL, calculated 56.4 04/28/2017 07:57 AM    Triglyceride 103 04/28/2017 07:57 AM    CHOL/HDL Ratio 3.6 04/28/2017 07:57 AM     Lab Results   Component Value Date/Time    AST (SGOT) 15 04/28/2017 07:57 AM    Alk. phosphatase 78 04/28/2017 07:57 AM     Lab Results   Component Value Date/Time    GFR est AA >60 04/28/2017 07:57 AM    GFR est non-AA >60 04/28/2017 07:57 AM    Creatinine 1.08 04/28/2017 07:57 AM    BUN 12 04/28/2017 07:57 AM    Sodium 141 04/28/2017 07:57 AM    Potassium 4.3 04/28/2017 07:57 AM    Chloride 103 04/28/2017 07:57 AM    CO2 27 04/28/2017 07:57 AM            Assessment / Plan     Diabetes well controlled, on current meds,   Hypertension well controlled, on current meds  Hyperlipidemia well controlled, on ZOcor. ICD-10-CM ICD-9-CM    1.  Routine general medical examination at a health care facility Z00.00 V70.0    2. Screening for alcoholism Z13.89 V79.1    3. HTN (hypertension), benign Y87 110.0 METABOLIC PANEL, COMPREHENSIVE   4. High blood cholesterol level E78.00 272.0 LIPID PANEL   5. Controlled type 2 diabetes mellitus with microalbuminuria, without long-term current use of insulin (HCC) E11.29 250.40 HEMOGLOBIN A1C W/O EAG    R80.9 791.0    6. Weight loss R63.4 783.21 Etiology unclear. Continue to monitor. Due to age would not pursue w/u unless he continues to lose more significant weight. Diabetic issues reviewed with him: diabetic diet discussed in detail, and low cholesterol diet, weight control and daily exercise discussed. Follow-up Disposition:  Return in about 3 months (around 8/15/2017) for labs 1 week before. Reviewed plan of care. Patient has provided input and agrees with goals.

## 2017-08-07 ENCOUNTER — HOSPITAL ENCOUNTER (OUTPATIENT)
Dept: LAB | Age: 82
Discharge: HOME | End: 2017-08-07
Payer: MEDICARE

## 2017-08-07 DIAGNOSIS — I10 HTN (HYPERTENSION), BENIGN: ICD-10-CM

## 2017-08-07 DIAGNOSIS — E11.29 CONTROLLED TYPE 2 DIABETES MELLITUS WITH MICROALBUMINURIA, WITHOUT LONG-TERM CURRENT USE OF INSULIN (HCC): ICD-10-CM

## 2017-08-07 DIAGNOSIS — E78.00 HIGH BLOOD CHOLESTEROL LEVEL: ICD-10-CM

## 2017-08-07 DIAGNOSIS — R80.9 CONTROLLED TYPE 2 DIABETES MELLITUS WITH MICROALBUMINURIA, WITHOUT LONG-TERM CURRENT USE OF INSULIN (HCC): ICD-10-CM

## 2017-08-07 LAB
ALBUMIN SERPL BCP-MCNC: 3.3 G/DL (ref 3.4–5)
ALBUMIN/GLOB SERPL: 0.6 {RATIO} (ref 0.8–1.7)
ALP SERPL-CCNC: 76 U/L (ref 45–117)
ALT SERPL-CCNC: 16 U/L (ref 16–61)
ANION GAP BLD CALC-SCNC: 9 MMOL/L (ref 3–18)
AST SERPL W P-5'-P-CCNC: 17 U/L (ref 15–37)
BILIRUB SERPL-MCNC: 0.4 MG/DL (ref 0.2–1)
BUN SERPL-MCNC: 15 MG/DL (ref 7–18)
BUN/CREAT SERPL: 15 (ref 12–20)
CALCIUM SERPL-MCNC: 9.4 MG/DL (ref 8.5–10.1)
CHLORIDE SERPL-SCNC: 109 MMOL/L (ref 100–108)
CHOLEST SERPL-MCNC: 109 MG/DL
CO2 SERPL-SCNC: 27 MMOL/L (ref 21–32)
CREAT SERPL-MCNC: 1.02 MG/DL (ref 0.6–1.3)
GLOBULIN SER CALC-MCNC: 5.8 G/DL (ref 2–4)
GLUCOSE SERPL-MCNC: 94 MG/DL (ref 74–99)
HBA1C MFR BLD: 6.3 % (ref 4.2–5.6)
HDLC SERPL-MCNC: 39 MG/DL (ref 40–60)
HDLC SERPL: 2.8 {RATIO} (ref 0–5)
LDLC SERPL CALC-MCNC: 55.2 MG/DL (ref 0–100)
LIPID PROFILE,FLP: ABNORMAL
POTASSIUM SERPL-SCNC: 4.2 MMOL/L (ref 3.5–5.5)
PROT SERPL-MCNC: 9.1 G/DL (ref 6.4–8.2)
SODIUM SERPL-SCNC: 145 MMOL/L (ref 136–145)
TRIGL SERPL-MCNC: 74 MG/DL (ref ?–150)
VLDLC SERPL CALC-MCNC: 14.8 MG/DL

## 2017-08-07 PROCEDURE — 83036 HEMOGLOBIN GLYCOSYLATED A1C: CPT | Performed by: INTERNAL MEDICINE

## 2017-08-07 PROCEDURE — 80053 COMPREHEN METABOLIC PANEL: CPT | Performed by: INTERNAL MEDICINE

## 2017-08-07 PROCEDURE — 36415 COLL VENOUS BLD VENIPUNCTURE: CPT | Performed by: INTERNAL MEDICINE

## 2017-08-07 PROCEDURE — 80061 LIPID PANEL: CPT | Performed by: INTERNAL MEDICINE

## 2017-08-14 ENCOUNTER — OFFICE VISIT (OUTPATIENT)
Dept: INTERNAL MEDICINE CLINIC | Age: 82
End: 2017-08-14

## 2017-08-14 VITALS
TEMPERATURE: 99.4 F | OXYGEN SATURATION: 97 % | SYSTOLIC BLOOD PRESSURE: 142 MMHG | HEART RATE: 67 BPM | BODY MASS INDEX: 24.91 KG/M2 | HEIGHT: 70 IN | DIASTOLIC BLOOD PRESSURE: 82 MMHG | WEIGHT: 174 LBS | RESPIRATION RATE: 18 BRPM

## 2017-08-14 DIAGNOSIS — E11.29 CONTROLLED TYPE 2 DIABETES MELLITUS WITH MICROALBUMINURIA, WITHOUT LONG-TERM CURRENT USE OF INSULIN (HCC): Primary | ICD-10-CM

## 2017-08-14 DIAGNOSIS — R80.9 CONTROLLED TYPE 2 DIABETES MELLITUS WITH MICROALBUMINURIA, WITHOUT LONG-TERM CURRENT USE OF INSULIN (HCC): Primary | ICD-10-CM

## 2017-08-14 DIAGNOSIS — I10 HTN (HYPERTENSION), BENIGN: ICD-10-CM

## 2017-08-14 DIAGNOSIS — E78.00 HIGH BLOOD CHOLESTEROL LEVEL: ICD-10-CM

## 2017-08-14 NOTE — PATIENT INSTRUCTIONS

## 2017-08-14 NOTE — PROGRESS NOTES
Patient is in the office today for a 3 month follow up. Patient declined eye exam.      1. Have you been to the ER, urgent care clinic since your last visit? Hospitalized since your last visit? No    2. Have you seen or consulted any other health care providers outside of the [address] [place] [address] since your last visit? Include any pap smears or colon screening.  No

## 2017-08-14 NOTE — MR AVS SNAPSHOT
Visit Information Date & Time Provider Department Dept. Phone Encounter #  
 8/14/2017  1:15 PM Lashawn Coto MD [facility]  Follow-up Instructions Return in about 4 months (around 12/14/2017) for labs 1 week before. Upcoming Health Maintenance Date Due DTaP/Tdap/Td series (1 - Tdap) 8/18/1956 ZOSTER VACCINE AGE 60> 6/18/1996 GLAUCOMA SCREENING Q2Y 8/18/2001 Pneumococcal 65+ Low/Medium Risk (1 of 2 - PCV13) 8/18/2001 EYE EXAM RETINAL OR DILATED Q1 8/23/2010 FOOT EXAM Q1 4/24/2016 INFLUENZA AGE 9 TO ADULT 10/1/2017* MICROALBUMIN Q1 12/29/2017 HEMOGLOBIN A1C Q6M 2/7/2018 MEDICARE YEARLY EXAM 5/16/2018 LIPID PANEL Q1 8/7/2018 *Topic was postponed. The date shown is not the original due date. Allergies as of 8/14/2017  Review Complete On: 8/14/2017 By: Emilie Matos LPN No Known Allergies Current Immunizations  Never Reviewed No immunizations on file. Not reviewed this visit You Were Diagnosed With   
  
 Codes Comments Controlled type 2 diabetes mellitus with microalbuminuria, without long-term current use of insulin (HCC)    -  Primary ICD-10-CM: E11.29, R80.9 ICD-9-CM: 250.40, 791.0   
 HTN (hypertension), benign     ICD-10-CM: I10 
ICD-9-CM: 401.1 High blood cholesterol level     ICD-10-CM: E78.00 ICD-9-CM: 272.0 Vitals BP Pulse Temp Resp Height(growth percentile) Weight(growth percentile) 142/82 (BP 1 Location: Left arm, BP Patient Position: Sitting) 67 99.4 °F (37.4 °C) (Oral) 18 5' 10\" (1.778 m) 174 lb (78.9 kg) SpO2 BMI Smoking Status 97% 24.97 kg/m2 Never Smoker Vitals History BMI and BSA Data Body Mass Index Body Surface Area 24.97 kg/m 2 1.97 m 2 Preferred Pharmacy Pharmacy Name Phone [id number] [place] [address] [address] [facility] [phone] Your Updated Medication List  
  
   
 This list is accurate as of: 8/14/17  1:15 PM.  Always use your most recent med list. amLODIPine-benazepril 10-20 mg per capsule Commonly known as:  LOTREL  
take 1 capsule by mouth once daily  
  
 aspirin 325 mg tablet Commonly known as:  ASPIRIN Take 325 mg by mouth daily. metFORMIN  mg tablet Commonly known as:  GLUCOPHAGE XR  
take 1 tablet by mouth once daily WITH DINNER  
  
 simvastatin 40 mg tablet Commonly known as:  ZOCOR  
take 1 tablet by mouth EVERYNIGHT AT BEDTIME Follow-up Instructions Return in about 4 months (around 12/14/2017) for labs 1 week before. Patient Instructions Learning About Diabetes Food Guidelines Your Care Instructions Meal planning is important to manage diabetes. It helps keep your blood sugar at a target level (which you set with your doctor). You don't have to eat special foods. You can eat what your family eats, including sweets once in a while. But you do have to pay attention to how often you eat and how much you eat of certain foods. You may want to work with a dietitian or a certified diabetes educator (CDE) to help you plan meals and snacks. A dietitian or CDE can also help you lose weight if that is one of your goals. What should you know about eating carbs? Managing the amount of carbohydrate (carbs) you eat is an important part of healthy meals when you have diabetes. Carbohydrate is found in many foods. · Learn which foods have carbs. And learn the amounts of carbs in different foods. ¨ Bread, cereal, pasta, and rice have about 15 grams of carbs in a serving. A serving is 1 slice of bread (1 ounce), ½ cup of cooked cereal, or 1/3 cup of cooked pasta or rice. ¨ Fruits have 15 grams of carbs in a serving.  A serving is 1 small fresh fruit, such as an apple or orange; ½ of a banana; ½ cup of cooked or canned fruit; ½ cup of fruit juice; 1 cup of melon or raspberries; or 2 tablespoons of dried fruit. ¨ Milk and no-sugar-added yogurt have 15 grams of carbs in a serving. A serving is 1 cup of milk or 2/3 cup of no-sugar-added yogurt. ¨ Starchy vegetables have 15 grams of carbs in a serving. A serving is ½ cup of mashed potatoes or sweet potato; 1 cup winter squash; ½ of a small baked potato; ½ cup of cooked beans; or ½ cup cooked corn or green peas. · Learn how much carbs to eat each day and at each meal. A dietitian or CDE can teach you how to keep track of the amount of carbs you eat. This is called carbohydrate counting. · If you are not sure how to count carbohydrate grams, use the Plate Method to plan meals. It is a good, quick way to make sure that you have a balanced meal. It also helps you spread carbs throughout the day. ¨ Divide your plate by types of foods. Put non-starchy vegetables on half the plate, meat or other protein food on one-quarter of the plate, and a grain or starchy vegetable in the final quarter of the plate. To this you can add a small piece of fruit and 1 cup of milk or yogurt, depending on how many carbs you are supposed to eat at a meal. 
· Try to eat about the same amount of carbs at each meal. Do not \"save up\" your daily allowance of carbs to eat at one meal. 
· Proteins have very little or no carbs per serving. Examples of proteins are beef, chicken, turkey, fish, eggs, tofu, cheese, cottage cheese, and peanut butter. A serving size of meat is 3 ounces, which is about the size of a deck of cards. Examples of meat substitute serving sizes (equal to 1 ounce of meat) are 1/4 cup of cottage cheese, 1 egg, 1 tablespoon of peanut butter, and ½ cup of tofu. How can you eat out and still eat healthy? · Learn to estimate the serving sizes of foods that have carbohydrate. If you measure food at home, it will be easier to estimate the amount in a serving of restaurant food.  
· If the meal you order has too much carbohydrate (such as potatoes, corn, or baked beans), ask to have a low-carbohydrate food instead. Ask for a salad or green vegetables. · If you use insulin, check your blood sugar before and after eating out to help you plan how much to eat in the future. · If you eat more carbohydrate at a meal than you had planned, take a walk or do other exercise. This will help lower your blood sugar. What else should you know? · Limit saturated fat, such as the fat from meat and dairy products. This is a healthy choice because people who have diabetes are at higher risk of heart disease. So choose lean cuts of meat and nonfat or low-fat dairy products. Use olive or canola oil instead of butter or shortening when cooking. · Don't skip meals. Your blood sugar may drop too low if you skip meals and take insulin or certain medicines for diabetes. · Check with your doctor before you drink alcohol. Alcohol can cause your blood sugar to drop too low. Alcohol can also cause a bad reaction if you take certain diabetes medicines. Follow-up care is a key part of your treatment and safety. Be sure to make and go to all appointments, and call your doctor if you are having problems. It's also a good idea to know your test results and keep a list of the medicines you take. Where can you learn more? Go to http://gladis-bigg.info/. Enter U701 in the search box to learn more about \"Learning About Diabetes Food Guidelines. \" Current as of: March 13, 2017 Content Version: 11.3 © 9349-1533 [employer]. Care instructions adapted under license by [employer] (which disclaims liability or warranty for this information). If you have questions about a medical condition or this instruction, always ask your healthcare professional. [address] any warranty or liability for your use of this information. Introducing [facility]!    
 Leo Anderson introduces [employer]en patient portal. Now you can access parts of your medical record, email your doctor's office, and request medication refills online. 1. In your internet browser, go to [email or web address] 2. Click on the First Time User? Click Here link in the Sign In box. You will see the New Member Sign Up page. 3. Enter your [facility] Access Code exactly as it appears below. You will not need to use this code after youve completed the sign-up process. If you do not sign up before the expiration date, you must request a new code. · [facility] Access Code: [email or web address] Expires: 11/12/2017 12:26 PM 
 
4. Enter the last four digits of your Social Security Number (xxxx) and Date of Birth (mm/dd/yyyy) as indicated and click Submit. You will be taken to the next sign-up page. 5. Create a [facility] ID. This will be your [facility] login ID and cannot be changed, so think of one that is secure and easy to remember. 6. Create a [facility] password. You can change your password at any time. 7. Enter your Password Reset Question and Answer. This can be used at a later time if you forget your password. 8. Enter your e-mail address. You will receive e-mail notification when new information is available in [address] 9. Click Sign Up. You can now view and download portions of your medical record. 10. Click the Download Summary menu link to download a portable copy of your medical information. If you have questions, please visit the Frequently Asked Questions section of the [facility] website. Remember, [facility] is NOT to be used for urgent needs. For medical emergencies, dial 911. Now available from your iPhone and Android! Please provide this summary of care documentation to your next provider. Your primary care clinician is listed as JOSE GUEVARA. If you have any questions after today's visit, please call [phone].

## 2017-08-15 NOTE — PROGRESS NOTES
Subjective:       Chief Complaint  The patient presents for follow up of diabetes, hypertension and high cholesterol. NEL Dunn is a [de-identified] y.o. male seen for follow up of diabetes. Too has hypertension and hyperlipidemia. Diabetes well controlled, no significant medication side effects noted, on metformin, hypertension well controlled, no significant medication side effects noted, on Lotrel, hyperlipidemia well controlled, no significant medication side effects noted, on Zocor. Diet and Lifestyle: generally follows a low fat low cholesterol diet, follows a diabetic diet regularly, sedentary    Home BP Monitoring: is not measured at home. Diabetic Review of Systems - home glucose monitoring: is performed regularly, 1x/day. Other symptoms and concerns:  Pt is by himself today. Has been concern by family about his memory but pt seems to still drive and take meds. Pt has been losing weight over the last few months. He says he does eat less. Will continue to monitor if pt continues to lose weight consider further evaluation. Pt has declined screening recently due to his age. Discussed the patient's BMI with him. The BMI follow up plan is as follows: BMI is out of normal parameters and plan is as follows: I have counseled this patient on diet and exercise regimens. Current Outpatient Prescriptions   Medication Sig    metFORMIN ER (GLUCOPHAGE XR) 500 mg tablet take 1 tablet by mouth once daily WITH DINNER    amLODIPine-benazepril (LOTREL) 10-20 mg per capsule take 1 capsule by mouth once daily    simvastatin (ZOCOR) 40 mg tablet take 1 tablet by mouth EVERYNIGHT AT BEDTIME    aspirin (ASPIRIN) 325 mg tablet Take 325 mg by mouth daily. No current facility-administered medications for this visit.               Review of Systems  Respiratory: negative for dyspnea on exertion  Cardiovascular: negative for chest pain    Objective:     Visit Vitals    /82 (BP 1 Location: Left arm, BP Patient Position: Sitting)    Pulse 67    Temp 99.4 °F (37.4 °C) (Oral)    Resp 18    Ht 5' 10\" (1.778 m)    Wt 174 lb (78.9 kg)    SpO2 97%    BMI 24.97 kg/m2        General appearance - alert, well appearing, and in no distress  Neck - supple, no significant adenopathy, carotids upstroke normal bilaterally, no bruits  Chest - clear to auscultation, no wheezes, rales or rhonchi, symmetric air entry  Heart - normal rate, regular rhythm, normal S1, S2, no murmurs, rubs, clicks or gallops  Extremities - peripheral pulses normal, no pedal edema, no clubbing or cyanosis        Labs:   Lab Results   Component Value Date/Time    Hemoglobin A1c 6.3 08/07/2017 08:32 AM    Hemoglobin A1c 6.0 04/28/2017 07:57 AM    Hemoglobin A1c 5.6 12/29/2016 08:16 AM    Microalbumin/Creat ratio (mg/g creat) 294 12/29/2016 08:16 AM    Microalbumin,urine random 60.30 12/29/2016 08:16 AM    LDL, calculated 55.2 08/07/2017 08:32 AM    Creatinine 1.02 08/07/2017 08:32 AM      Lab Results   Component Value Date/Time    Cholesterol, total 109 08/07/2017 08:32 AM    HDL Cholesterol 39 08/07/2017 08:32 AM    LDL, calculated 55.2 08/07/2017 08:32 AM    Triglyceride 74 08/07/2017 08:32 AM    CHOL/HDL Ratio 2.8 08/07/2017 08:32 AM     Lab Results   Component Value Date/Time    AST (SGOT) 17 08/07/2017 08:32 AM    Alk. phosphatase 76 08/07/2017 08:32 AM     Lab Results   Component Value Date/Time    GFR est AA >60 08/07/2017 08:32 AM    GFR est non-AA >60 08/07/2017 08:32 AM    Creatinine 1.02 08/07/2017 08:32 AM    BUN 15 08/07/2017 08:32 AM    Sodium 145 08/07/2017 08:32 AM    Potassium 4.2 08/07/2017 08:32 AM    Chloride 109 08/07/2017 08:32 AM    CO2 27 08/07/2017 08:32 AM            Assessment / Plan     Diabetes well controlled, on current meds,   Hypertension well controlled, on current meds  Hyperlipidemia well controlled, on ZOcor. ICD-10-CM ICD-9-CM    1.  Controlled type 2 diabetes mellitus with microalbuminuria, without long-term current use of insulin (HCC) E11.29 250.40 HEMOGLOBIN A1C W/O EAG    R80.9 791.0 MICROALBUMIN, UR, RAND W/ MICROALBUMIN/CREA RATIO   2. HTN (hypertension), benign M07 676.6 METABOLIC PANEL, COMPREHENSIVE   3. High blood cholesterol level E78.00 272.0 LIPID PANEL                Diabetic issues reviewed with him: diabetic diet discussed in detail, and low cholesterol diet, weight control and daily exercise discussed. Follow-up Disposition:  Return in about 4 months (around 12/14/2017) for labs 1 week before. Reviewed plan of care. Patient has provided input and agrees with goals.

## 2017-12-12 ENCOUNTER — HOSPITAL ENCOUNTER (OUTPATIENT)
Dept: LAB | Age: 82
Discharge: HOME | End: 2017-12-12
Payer: MEDICARE

## 2017-12-12 DIAGNOSIS — R80.9 CONTROLLED TYPE 2 DIABETES MELLITUS WITH MICROALBUMINURIA, WITHOUT LONG-TERM CURRENT USE OF INSULIN (HCC): ICD-10-CM

## 2017-12-12 DIAGNOSIS — E78.00 HIGH BLOOD CHOLESTEROL LEVEL: ICD-10-CM

## 2017-12-12 DIAGNOSIS — I10 HTN (HYPERTENSION), BENIGN: ICD-10-CM

## 2017-12-12 DIAGNOSIS — E11.29 CONTROLLED TYPE 2 DIABETES MELLITUS WITH MICROALBUMINURIA, WITHOUT LONG-TERM CURRENT USE OF INSULIN (HCC): ICD-10-CM

## 2017-12-12 LAB
ALBUMIN SERPL-MCNC: 3.4 G/DL (ref 3.4–5)
ALBUMIN/GLOB SERPL: 0.6 {RATIO} (ref 0.8–1.7)
ALP SERPL-CCNC: 79 U/L (ref 45–117)
ALT SERPL-CCNC: 21 U/L (ref 16–61)
ANION GAP SERPL CALC-SCNC: 9 MMOL/L (ref 3–18)
AST SERPL-CCNC: 15 U/L (ref 15–37)
BILIRUB SERPL-MCNC: 0.5 MG/DL (ref 0.2–1)
BUN SERPL-MCNC: 10 MG/DL (ref 7–18)
BUN/CREAT SERPL: 10 (ref 12–20)
CALCIUM SERPL-MCNC: 9 MG/DL (ref 8.5–10.1)
CHLORIDE SERPL-SCNC: 104 MMOL/L (ref 100–108)
CHOLEST SERPL-MCNC: 105 MG/DL
CO2 SERPL-SCNC: 28 MMOL/L (ref 21–32)
CREAT SERPL-MCNC: 1 MG/DL (ref 0.6–1.3)
GLOBULIN SER CALC-MCNC: 5.9 G/DL (ref 2–4)
GLUCOSE SERPL-MCNC: 88 MG/DL (ref 74–99)
HBA1C MFR BLD: 5.8 % (ref 4.2–5.6)
HDLC SERPL-MCNC: 42 MG/DL (ref 40–60)
HDLC SERPL: 2.5 {RATIO} (ref 0–5)
LDLC SERPL CALC-MCNC: 49 MG/DL (ref 0–100)
LIPID PROFILE,FLP: NORMAL
POTASSIUM SERPL-SCNC: 4.4 MMOL/L (ref 3.5–5.5)
PROT SERPL-MCNC: 9.3 G/DL (ref 6.4–8.2)
SODIUM SERPL-SCNC: 141 MMOL/L (ref 136–145)
TRIGL SERPL-MCNC: 70 MG/DL (ref ?–150)
VLDLC SERPL CALC-MCNC: 14 MG/DL

## 2017-12-12 PROCEDURE — 80061 LIPID PANEL: CPT | Performed by: INTERNAL MEDICINE

## 2017-12-12 PROCEDURE — 80053 COMPREHEN METABOLIC PANEL: CPT | Performed by: INTERNAL MEDICINE

## 2017-12-12 PROCEDURE — 83036 HEMOGLOBIN GLYCOSYLATED A1C: CPT | Performed by: INTERNAL MEDICINE

## 2017-12-12 PROCEDURE — 36415 COLL VENOUS BLD VENIPUNCTURE: CPT | Performed by: INTERNAL MEDICINE

## 2017-12-12 PROCEDURE — 82043 UR ALBUMIN QUANTITATIVE: CPT | Performed by: INTERNAL MEDICINE

## 2017-12-13 LAB
CREAT UR-MCNC: 167.18 MG/DL (ref 30–125)
MICROALBUMIN UR-MCNC: 18.6 MG/DL (ref 0–3)
MICROALBUMIN/CREAT UR-RTO: 111 MG/G (ref 0–30)

## 2018-01-01 ENCOUNTER — OFFICE VISIT (OUTPATIENT)
Dept: FAMILY MEDICINE CLINIC | Age: 83
End: 2018-01-01

## 2018-01-01 ENCOUNTER — HOME CARE VISIT (OUTPATIENT)
Dept: SCHEDULING | Facility: HOME HEALTH | Age: 83
End: 2018-01-01
Payer: MEDICARE

## 2018-01-01 ENCOUNTER — HOSPITAL ENCOUNTER (OUTPATIENT)
Dept: LAB | Age: 83
Discharge: HOME | End: 2018-11-12
Payer: MEDICARE

## 2018-01-01 ENCOUNTER — HOME CARE VISIT (OUTPATIENT)
Dept: HOME HEALTH SERVICES | Facility: HOME HEALTH | Age: 83
End: 2018-01-01
Payer: MEDICARE

## 2018-01-01 ENCOUNTER — TELEPHONE (OUTPATIENT)
Dept: FAMILY MEDICINE CLINIC | Age: 83
End: 2018-01-01

## 2018-01-01 ENCOUNTER — HOSPITAL ENCOUNTER (OUTPATIENT)
Dept: LAB | Age: 83
Discharge: HOME | End: 2018-07-12
Payer: MEDICARE

## 2018-01-01 ENCOUNTER — HOME HEALTH ADMISSION (OUTPATIENT)
Dept: HOME HEALTH SERVICES | Facility: HOME HEALTH | Age: 83
End: 2018-01-01
Payer: MEDICARE

## 2018-01-01 ENCOUNTER — HOSPITAL ENCOUNTER (EMERGENCY)
Age: 83
Discharge: HOME | End: 2018-10-16
Attending: EMERGENCY MEDICINE | Admitting: INTERNAL MEDICINE
Payer: MEDICARE

## 2018-01-01 ENCOUNTER — HOSPITAL ENCOUNTER (OUTPATIENT)
Dept: LAB | Age: 83
Discharge: HOME | End: 2018-11-19
Payer: MEDICARE

## 2018-01-01 ENCOUNTER — HOSPITAL ENCOUNTER (OUTPATIENT)
Dept: LAB | Age: 83
Discharge: HOME | End: 2018-04-09
Payer: MEDICARE

## 2018-01-01 VITALS
SYSTOLIC BLOOD PRESSURE: 137 MMHG | DIASTOLIC BLOOD PRESSURE: 83 MMHG | HEART RATE: 60 BPM | TEMPERATURE: 97.8 F | OXYGEN SATURATION: 97 %

## 2018-01-01 VITALS
RESPIRATION RATE: 20 BRPM | HEART RATE: 62 BPM | BODY MASS INDEX: 22.05 KG/M2 | HEIGHT: 70 IN | SYSTOLIC BLOOD PRESSURE: 167 MMHG | TEMPERATURE: 98.1 F | OXYGEN SATURATION: 95 % | WEIGHT: 154 LBS | DIASTOLIC BLOOD PRESSURE: 98 MMHG

## 2018-01-01 VITALS
TEMPERATURE: 98.2 F | HEART RATE: 85 BPM | OXYGEN SATURATION: 99 % | SYSTOLIC BLOOD PRESSURE: 122 MMHG | DIASTOLIC BLOOD PRESSURE: 80 MMHG

## 2018-01-01 VITALS
DIASTOLIC BLOOD PRESSURE: 78 MMHG | SYSTOLIC BLOOD PRESSURE: 128 MMHG | TEMPERATURE: 96 F | RESPIRATION RATE: 20 BRPM | HEART RATE: 74 BPM | OXYGEN SATURATION: 98 %

## 2018-01-01 VITALS
BODY MASS INDEX: 20.66 KG/M2 | SYSTOLIC BLOOD PRESSURE: 137 MMHG | DIASTOLIC BLOOD PRESSURE: 83 MMHG | TEMPERATURE: 97.6 F | OXYGEN SATURATION: 97 % | HEART RATE: 64 BPM | WEIGHT: 144 LBS | RESPIRATION RATE: 16 BRPM

## 2018-01-01 VITALS
TEMPERATURE: 98.1 F | DIASTOLIC BLOOD PRESSURE: 84 MMHG | SYSTOLIC BLOOD PRESSURE: 130 MMHG | OXYGEN SATURATION: 97 % | HEART RATE: 77 BPM

## 2018-01-01 VITALS
SYSTOLIC BLOOD PRESSURE: 159 MMHG | OXYGEN SATURATION: 96 % | RESPIRATION RATE: 16 BRPM | TEMPERATURE: 98.2 F | BODY MASS INDEX: 22.76 KG/M2 | HEART RATE: 66 BPM | WEIGHT: 159 LBS | HEIGHT: 70 IN | DIASTOLIC BLOOD PRESSURE: 88 MMHG

## 2018-01-01 VITALS
DIASTOLIC BLOOD PRESSURE: 70 MMHG | TEMPERATURE: 98.1 F | OXYGEN SATURATION: 98 % | HEART RATE: 77 BPM | SYSTOLIC BLOOD PRESSURE: 124 MMHG

## 2018-01-01 VITALS
OXYGEN SATURATION: 98 % | HEIGHT: 70 IN | RESPIRATION RATE: 20 BRPM | HEART RATE: 74 BPM | BODY MASS INDEX: 21.47 KG/M2 | TEMPERATURE: 98.1 F | WEIGHT: 150 LBS | SYSTOLIC BLOOD PRESSURE: 158 MMHG | DIASTOLIC BLOOD PRESSURE: 82 MMHG

## 2018-01-01 VITALS
HEART RATE: 85 BPM | DIASTOLIC BLOOD PRESSURE: 68 MMHG | SYSTOLIC BLOOD PRESSURE: 120 MMHG | OXYGEN SATURATION: 97 % | TEMPERATURE: 99 F

## 2018-01-01 VITALS
TEMPERATURE: 98.5 F | SYSTOLIC BLOOD PRESSURE: 124 MMHG | DIASTOLIC BLOOD PRESSURE: 79 MMHG | RESPIRATION RATE: 18 BRPM | OXYGEN SATURATION: 97 % | HEART RATE: 75 BPM

## 2018-01-01 VITALS
OXYGEN SATURATION: 98 % | DIASTOLIC BLOOD PRESSURE: 76 MMHG | SYSTOLIC BLOOD PRESSURE: 118 MMHG | TEMPERATURE: 97.9 F | HEART RATE: 69 BPM

## 2018-01-01 VITALS
TEMPERATURE: 98.6 F | SYSTOLIC BLOOD PRESSURE: 121 MMHG | DIASTOLIC BLOOD PRESSURE: 76 MMHG | HEART RATE: 64 BPM | OXYGEN SATURATION: 95 %

## 2018-01-01 VITALS
SYSTOLIC BLOOD PRESSURE: 122 MMHG | HEART RATE: 48 BPM | TEMPERATURE: 98.8 F | OXYGEN SATURATION: 98 % | DIASTOLIC BLOOD PRESSURE: 70 MMHG

## 2018-01-01 VITALS
DIASTOLIC BLOOD PRESSURE: 64 MMHG | TEMPERATURE: 96.6 F | HEART RATE: 77 BPM | OXYGEN SATURATION: 98 % | SYSTOLIC BLOOD PRESSURE: 128 MMHG

## 2018-01-01 VITALS
TEMPERATURE: 97.1 F | OXYGEN SATURATION: 98 % | HEART RATE: 64 BPM | DIASTOLIC BLOOD PRESSURE: 80 MMHG | SYSTOLIC BLOOD PRESSURE: 120 MMHG

## 2018-01-01 VITALS
HEART RATE: 62 BPM | SYSTOLIC BLOOD PRESSURE: 169 MMHG | TEMPERATURE: 97.3 F | OXYGEN SATURATION: 98 % | RESPIRATION RATE: 17 BRPM | DIASTOLIC BLOOD PRESSURE: 86 MMHG

## 2018-01-01 VITALS — HEART RATE: 79 BPM | OXYGEN SATURATION: 97 % | DIASTOLIC BLOOD PRESSURE: 84 MMHG | SYSTOLIC BLOOD PRESSURE: 130 MMHG

## 2018-01-01 DIAGNOSIS — R80.9 CONTROLLED TYPE 2 DIABETES MELLITUS WITH MICROALBUMINURIA, WITHOUT LONG-TERM CURRENT USE OF INSULIN (HCC): Primary | ICD-10-CM

## 2018-01-01 DIAGNOSIS — I10 HTN (HYPERTENSION), BENIGN: ICD-10-CM

## 2018-01-01 DIAGNOSIS — I10 ESSENTIAL HYPERTENSION: ICD-10-CM

## 2018-01-01 DIAGNOSIS — F02.80 LATE ONSET ALZHEIMER'S DISEASE WITHOUT BEHAVIORAL DISTURBANCE (HCC): ICD-10-CM

## 2018-01-01 DIAGNOSIS — E11.29 CONTROLLED TYPE 2 DIABETES MELLITUS WITH MICROALBUMINURIA, WITHOUT LONG-TERM CURRENT USE OF INSULIN (HCC): ICD-10-CM

## 2018-01-01 DIAGNOSIS — G30.1 LATE ONSET ALZHEIMER'S DISEASE WITHOUT BEHAVIORAL DISTURBANCE (HCC): ICD-10-CM

## 2018-01-01 DIAGNOSIS — I10 HTN (HYPERTENSION), BENIGN: Primary | ICD-10-CM

## 2018-01-01 DIAGNOSIS — R80.9 CONTROLLED TYPE 2 DIABETES MELLITUS WITH MICROALBUMINURIA, WITHOUT LONG-TERM CURRENT USE OF INSULIN (HCC): ICD-10-CM

## 2018-01-01 DIAGNOSIS — D64.9 ANEMIA, UNSPECIFIED TYPE: Primary | ICD-10-CM

## 2018-01-01 DIAGNOSIS — I10 ESSENTIAL HYPERTENSION: Primary | ICD-10-CM

## 2018-01-01 DIAGNOSIS — E78.00 HIGH BLOOD CHOLESTEROL LEVEL: ICD-10-CM

## 2018-01-01 DIAGNOSIS — D64.9 ANEMIA, UNSPECIFIED TYPE: ICD-10-CM

## 2018-01-01 DIAGNOSIS — N39.0 URINARY TRACT INFECTION WITHOUT HEMATURIA, SITE UNSPECIFIED: Primary | ICD-10-CM

## 2018-01-01 DIAGNOSIS — R63.4 WEIGHT LOSS: ICD-10-CM

## 2018-01-01 DIAGNOSIS — E11.29 CONTROLLED TYPE 2 DIABETES MELLITUS WITH MICROALBUMINURIA, WITHOUT LONG-TERM CURRENT USE OF INSULIN (HCC): Primary | ICD-10-CM

## 2018-01-01 LAB
ALBUMIN 24H MFR UR ELPH: 41.5 %
ALBUMIN SERPL ELPH-MCNC: 3.4 G/DL (ref 2.9–4.4)
ALBUMIN SERPL-MCNC: 3.1 G/DL (ref 3.4–5)
ALBUMIN SERPL-MCNC: 3.1 G/DL (ref 3.4–5)
ALBUMIN SERPL-MCNC: 3.2 G/DL (ref 3.4–5)
ALBUMIN/GLOB SERPL: 0.6 {RATIO} (ref 0.7–1.7)
ALBUMIN/GLOB SERPL: 0.6 {RATIO} (ref 0.8–1.7)
ALP SERPL-CCNC: 68 U/L (ref 45–117)
ALP SERPL-CCNC: 74 U/L (ref 45–117)
ALP SERPL-CCNC: 83 U/L (ref 45–117)
ALPHA1 GLOB 24H MFR UR ELPH: 4.2 %
ALPHA1 GLOB SERPL ELPH-MCNC: 0.2 G/DL (ref 0–0.4)
ALPHA2 GLOB 24H MFR UR ELPH: 13.6 %
ALPHA2 GLOB SERPL ELPH-MCNC: 0.7 G/DL (ref 0.4–1)
ALT SERPL-CCNC: 13 U/L (ref 16–61)
ALT SERPL-CCNC: 19 U/L (ref 16–61)
ALT SERPL-CCNC: 23 U/L (ref 16–61)
ANION GAP SERPL CALC-SCNC: 8 MMOL/L (ref 3–18)
ANION GAP SERPL CALC-SCNC: 8 MMOL/L (ref 3–18)
ANION GAP SERPL CALC-SCNC: 9 MMOL/L (ref 3–18)
ANION GAP SERPL CALC-SCNC: 9 MMOL/L (ref 3–18)
APPEARANCE UR: CLEAR
AST SERPL-CCNC: 10 U/L (ref 15–37)
AST SERPL-CCNC: 13 U/L (ref 15–37)
AST SERPL-CCNC: 19 U/L (ref 15–37)
ATRIAL RATE: 63 BPM
B-GLOBULIN MFR UR ELPH: 27.8 %
B-GLOBULIN SERPL ELPH-MCNC: 4.1 G/DL (ref 0.7–1.3)
BACTERIA URNS QL MICRO: ABNORMAL /HPF
BASOPHILS # BLD: 0 K/UL (ref 0–0.1)
BASOPHILS # BLD: 0 K/UL (ref 0–0.1)
BASOPHILS NFR BLD: 0 % (ref 0–2)
BASOPHILS NFR BLD: 0 % (ref 0–2)
BILIRUB SERPL-MCNC: 0.3 MG/DL (ref 0.2–1)
BILIRUB SERPL-MCNC: 0.3 MG/DL (ref 0.2–1)
BILIRUB SERPL-MCNC: 0.4 MG/DL (ref 0.2–1)
BILIRUB UR QL: NEGATIVE
BUN SERPL-MCNC: 11 MG/DL (ref 7–18)
BUN SERPL-MCNC: 11 MG/DL (ref 7–18)
BUN SERPL-MCNC: 13 MG/DL (ref 7–18)
BUN SERPL-MCNC: 18 MG/DL (ref 7–18)
BUN/CREAT SERPL: 12 (ref 12–20)
BUN/CREAT SERPL: 12 (ref 12–20)
BUN/CREAT SERPL: 16 (ref 12–20)
BUN/CREAT SERPL: 9 (ref 12–20)
CALCIUM SERPL-MCNC: 10 MG/DL (ref 8.5–10.1)
CALCIUM SERPL-MCNC: 8.6 MG/DL (ref 8.5–10.1)
CALCIUM SERPL-MCNC: 8.6 MG/DL (ref 8.5–10.1)
CALCIUM SERPL-MCNC: 9.1 MG/DL (ref 8.5–10.1)
CALCULATED P AXIS, ECG09: 66 DEGREES
CALCULATED R AXIS, ECG10: 45 DEGREES
CALCULATED T AXIS, ECG11: 62 DEGREES
CHLORIDE SERPL-SCNC: 104 MMOL/L (ref 100–108)
CHLORIDE SERPL-SCNC: 106 MMOL/L (ref 100–108)
CHOLEST SERPL-MCNC: 83 MG/DL
CHOLEST SERPL-MCNC: 84 MG/DL
CHOLEST SERPL-MCNC: 88 MG/DL
CO2 SERPL-SCNC: 26 MMOL/L (ref 21–32)
CO2 SERPL-SCNC: 26 MMOL/L (ref 21–32)
CO2 SERPL-SCNC: 27 MMOL/L (ref 21–32)
CO2 SERPL-SCNC: 28 MMOL/L (ref 21–32)
COLLECT DURATION TIME UR: NORMAL HR
COLOR UR: ABNORMAL
CREAT SERPL-MCNC: 0.94 MG/DL (ref 0.6–1.3)
CREAT SERPL-MCNC: 1.05 MG/DL (ref 0.6–1.3)
CREAT SERPL-MCNC: 1.11 MG/DL (ref 0.6–1.3)
CREAT SERPL-MCNC: 1.16 MG/DL (ref 0.6–1.3)
DIAGNOSIS, 93000: NORMAL
DIFFERENTIAL METHOD BLD: ABNORMAL
DIFFERENTIAL METHOD BLD: ABNORMAL
EOSINOPHIL # BLD: 0 K/UL (ref 0–0.4)
EOSINOPHIL # BLD: 0 K/UL (ref 0–0.4)
EOSINOPHIL NFR BLD: 0 % (ref 0–5)
EOSINOPHIL NFR BLD: 1 % (ref 0–5)
EPITH CASTS URNS QL MICRO: ABNORMAL /LPF (ref 0–5)
ERYTHROCYTE [DISTWIDTH] IN BLOOD BY AUTOMATED COUNT: 13 % (ref 11.6–14.5)
ERYTHROCYTE [DISTWIDTH] IN BLOOD BY AUTOMATED COUNT: 13.3 % (ref 11.6–14.5)
GAMMA GLOB 24H MFR UR ELPH: 12.9 %
GAMMA GLOB SERPL ELPH-MCNC: 0.4 G/DL (ref 0.4–1.8)
GLOBULIN SER CALC-MCNC: 5.4 G/DL (ref 2.2–3.9)
GLOBULIN SER CALC-MCNC: 5.5 G/DL (ref 2–4)
GLOBULIN SER CALC-MCNC: 5.6 G/DL (ref 2–4)
GLOBULIN SER CALC-MCNC: 5.8 G/DL (ref 2–4)
GLUCOSE SERPL-MCNC: 100 MG/DL (ref 74–99)
GLUCOSE SERPL-MCNC: 102 MG/DL (ref 74–99)
GLUCOSE SERPL-MCNC: 87 MG/DL (ref 74–99)
GLUCOSE SERPL-MCNC: 88 MG/DL (ref 74–99)
GLUCOSE UR STRIP.AUTO-MCNC: NEGATIVE MG/DL
HBA1C MFR BLD: 5.5 % (ref 4.2–5.6)
HBA1C MFR BLD: 5.6 % (ref 4.2–5.6)
HCT VFR BLD AUTO: 28.8 % (ref 36–48)
HCT VFR BLD AUTO: 34.1 % (ref 36–48)
HDLC SERPL-MCNC: 31 MG/DL (ref 40–60)
HDLC SERPL-MCNC: 31 MG/DL (ref 40–60)
HDLC SERPL-MCNC: 33 MG/DL (ref 40–60)
HDLC SERPL: 2.7 {RATIO} (ref 0–5)
HGB BLD-MCNC: 10.9 G/DL (ref 13–16)
HGB BLD-MCNC: 9.7 G/DL (ref 13–16)
HGB UR QL STRIP: ABNORMAL
INTERPRETATION UR IFE-IMP: NORMAL
IRON SATN MFR SERPL: 26 %
IRON SERPL-MCNC: 49 UG/DL (ref 50–175)
KETONES UR QL STRIP.AUTO: NEGATIVE MG/DL
LACTATE BLD-SCNC: 1.9 MMOL/L (ref 0.4–2)
LDLC SERPL CALC-MCNC: 37.4 MG/DL (ref 0–100)
LDLC SERPL CALC-MCNC: 39.4 MG/DL (ref 0–100)
LDLC SERPL CALC-MCNC: 41.2 MG/DL (ref 0–100)
LEUKOCYTE ESTERASE UR QL STRIP.AUTO: ABNORMAL
LIPID PROFILE,FLP: ABNORMAL
LYMPHOCYTES # BLD: 0.6 K/UL (ref 0.9–3.6)
LYMPHOCYTES # BLD: 1.3 K/UL (ref 0.9–3.6)
LYMPHOCYTES NFR BLD: 12 % (ref 21–52)
LYMPHOCYTES NFR BLD: 36 % (ref 21–52)
M PROTEIN 24H MFR UR ELPH: NORMAL %
M PROTEIN 24H UR ELPH-MRATE: NORMAL MG/24 HR
M PROTEIN SERPL ELPH-MCNC: 2.7 G/DL
MCH RBC QN AUTO: 28.5 PG (ref 24–34)
MCH RBC QN AUTO: 28.8 PG (ref 24–34)
MCHC RBC AUTO-ENTMCNC: 32 G/DL (ref 31–37)
MCHC RBC AUTO-ENTMCNC: 33.7 G/DL (ref 31–37)
MCV RBC AUTO: 84.7 FL (ref 74–97)
MCV RBC AUTO: 90 FL (ref 74–97)
MONOCYTES # BLD: 0.3 K/UL (ref 0.05–1.2)
MONOCYTES # BLD: 0.6 K/UL (ref 0.05–1.2)
MONOCYTES NFR BLD: 11 % (ref 3–10)
MONOCYTES NFR BLD: 8 % (ref 3–10)
NEUTS SEG # BLD: 2 K/UL (ref 1.8–8)
NEUTS SEG # BLD: 4 K/UL (ref 1.8–8)
NEUTS SEG NFR BLD: 55 % (ref 40–73)
NEUTS SEG NFR BLD: 77 % (ref 40–73)
NITRITE UR QL STRIP.AUTO: POSITIVE
NOTE, 149533: NORMAL
P-R INTERVAL, ECG05: 180 MS
PH UR STRIP: 5 [PH] (ref 5–8)
PLATELET # BLD AUTO: 154 K/UL (ref 135–420)
PLATELET # BLD AUTO: 204 K/UL (ref 135–420)
PMV BLD AUTO: 11.2 FL (ref 9.2–11.8)
PMV BLD AUTO: 11.2 FL (ref 9.2–11.8)
POTASSIUM SERPL-SCNC: 4.2 MMOL/L (ref 3.5–5.5)
POTASSIUM SERPL-SCNC: 4.2 MMOL/L (ref 3.5–5.5)
POTASSIUM SERPL-SCNC: 4.3 MMOL/L (ref 3.5–5.5)
POTASSIUM SERPL-SCNC: 4.7 MMOL/L (ref 3.5–5.5)
PROT 24H UR-MRATE: NORMAL MG/24 HR (ref 30–150)
PROT SERPL-MCNC: 8.6 G/DL (ref 6.4–8.2)
PROT SERPL-MCNC: 8.7 G/DL (ref 6.4–8.2)
PROT SERPL-MCNC: 8.8 G/DL (ref 6–8.5)
PROT SERPL-MCNC: 9 G/DL (ref 6.4–8.2)
PROT UR STRIP-MCNC: 100 MG/DL
PROT UR-MCNC: 10.7 MG/DL
Q-T INTERVAL, ECG07: 384 MS
QRS DURATION, ECG06: 84 MS
QTC CALCULATION (BEZET), ECG08: 392 MS
RBC # BLD AUTO: 3.4 M/UL (ref 4.7–5.5)
RBC # BLD AUTO: 3.79 M/UL (ref 4.7–5.5)
RBC #/AREA URNS HPF: ABNORMAL /HPF (ref 0–5)
SODIUM SERPL-SCNC: 138 MMOL/L (ref 136–145)
SODIUM SERPL-SCNC: 141 MMOL/L (ref 136–145)
SODIUM SERPL-SCNC: 141 MMOL/L (ref 136–145)
SODIUM SERPL-SCNC: 143 MMOL/L (ref 136–145)
SP GR UR REFRACTOMETRY: 1.02 (ref 1–1.03)
SPECIMEN VOL ?TM UR: NORMAL ML
TIBC SERPL-MCNC: 189 UG/DL (ref 250–450)
TRIGL SERPL-MCNC: 63 MG/DL (ref ?–150)
TRIGL SERPL-MCNC: 69 MG/DL (ref ?–150)
TRIGL SERPL-MCNC: 78 MG/DL (ref ?–150)
TROPONIN I SERPL-MCNC: 0.03 NG/ML (ref 0–0.04)
UROBILINOGEN UR QL STRIP.AUTO: 1 EU/DL (ref 0.2–1)
VENTRICULAR RATE, ECG03: 63 BPM
VLDLC SERPL CALC-MCNC: 12.6 MG/DL
VLDLC SERPL CALC-MCNC: 13.8 MG/DL
VLDLC SERPL CALC-MCNC: 15.6 MG/DL
WBC # BLD AUTO: 3.6 K/UL (ref 4.6–13.2)
WBC # BLD AUTO: 5.2 K/UL (ref 4.6–13.2)
WBC URNS QL MICRO: PRESENT /HPF (ref 0–4)

## 2018-01-01 PROCEDURE — 93005 ELECTROCARDIOGRAM TRACING: CPT

## 2018-01-01 PROCEDURE — G0299 HHS/HOSPICE OF RN EA 15 MIN: HCPCS

## 2018-01-01 PROCEDURE — 83540 ASSAY OF IRON: CPT

## 2018-01-01 PROCEDURE — 80053 COMPREHEN METABOLIC PANEL: CPT | Performed by: INTERNAL MEDICINE

## 2018-01-01 PROCEDURE — 3331090001 HH PPS REVENUE CREDIT

## 2018-01-01 PROCEDURE — 36415 COLL VENOUS BLD VENIPUNCTURE: CPT | Performed by: INTERNAL MEDICINE

## 2018-01-01 PROCEDURE — G0157 HHC PT ASSISTANT EA 15: HCPCS

## 2018-01-01 PROCEDURE — 3331090002 HH PPS REVENUE DEBIT

## 2018-01-01 PROCEDURE — 65270000029 HC RM PRIVATE

## 2018-01-01 PROCEDURE — 96361 HYDRATE IV INFUSION ADD-ON: CPT

## 2018-01-01 PROCEDURE — G0496 LPN CARE TRAIN/EDU IN HH: HCPCS

## 2018-01-01 PROCEDURE — 80061 LIPID PANEL: CPT | Performed by: INTERNAL MEDICINE

## 2018-01-01 PROCEDURE — G0153 HHCP-SVS OF S/L PATH,EA 15MN: HCPCS

## 2018-01-01 PROCEDURE — 96374 THER/PROPH/DIAG INJ IV PUSH: CPT

## 2018-01-01 PROCEDURE — 81001 URINALYSIS AUTO W/SCOPE: CPT | Performed by: EMERGENCY MEDICINE

## 2018-01-01 PROCEDURE — 83036 HEMOGLOBIN GLYCOSYLATED A1C: CPT | Performed by: INTERNAL MEDICINE

## 2018-01-01 PROCEDURE — 83605 ASSAY OF LACTIC ACID: CPT

## 2018-01-01 PROCEDURE — G0151 HHCP-SERV OF PT,EA 15 MIN: HCPCS

## 2018-01-01 PROCEDURE — 85025 COMPLETE CBC W/AUTO DIFF WBC: CPT | Performed by: EMERGENCY MEDICINE

## 2018-01-01 PROCEDURE — 400013 HH SOC

## 2018-01-01 PROCEDURE — 74011250636 HC RX REV CODE- 250/636: Performed by: EMERGENCY MEDICINE

## 2018-01-01 PROCEDURE — 84166 PROTEIN E-PHORESIS/URINE/CSF: CPT

## 2018-01-01 PROCEDURE — 74011250636 HC RX REV CODE- 250/636: Performed by: INTERNAL MEDICINE

## 2018-01-01 PROCEDURE — 84484 ASSAY OF TROPONIN QUANT: CPT | Performed by: EMERGENCY MEDICINE

## 2018-01-01 PROCEDURE — 84155 ASSAY OF PROTEIN SERUM: CPT

## 2018-01-01 PROCEDURE — 36415 COLL VENOUS BLD VENIPUNCTURE: CPT

## 2018-01-01 PROCEDURE — 80048 BASIC METABOLIC PNL TOTAL CA: CPT | Performed by: EMERGENCY MEDICINE

## 2018-01-01 PROCEDURE — 83036 HEMOGLOBIN GLYCOSYLATED A1C: CPT

## 2018-01-01 PROCEDURE — 85025 COMPLETE CBC W/AUTO DIFF WBC: CPT

## 2018-01-01 PROCEDURE — 80053 COMPREHEN METABOLIC PANEL: CPT

## 2018-01-01 PROCEDURE — 80061 LIPID PANEL: CPT

## 2018-01-01 PROCEDURE — 99285 EMERGENCY DEPT VISIT HI MDM: CPT

## 2018-01-01 RX ORDER — AMLODIPINE BESYLATE 5 MG/1
5 TABLET ORAL DAILY
Qty: 30 TAB | Refills: 5 | Status: SHIPPED | OUTPATIENT
Start: 2018-01-01

## 2018-01-01 RX ORDER — CEFTRIAXONE 1 G/1
1 INJECTION, POWDER, FOR SOLUTION INTRAMUSCULAR; INTRAVENOUS
Status: COMPLETED | OUTPATIENT
Start: 2018-01-01 | End: 2018-01-01

## 2018-01-01 RX ORDER — NITROFURANTOIN 25; 75 MG/1; MG/1
100 CAPSULE ORAL 2 TIMES DAILY
Qty: 6 CAP | Refills: 0 | Status: SHIPPED | OUTPATIENT
Start: 2018-01-01 | End: 2018-01-01

## 2018-01-01 RX ORDER — SODIUM CHLORIDE 9 MG/ML
100 INJECTION, SOLUTION INTRAVENOUS ONCE
Status: COMPLETED | OUTPATIENT
Start: 2018-01-01 | End: 2018-01-01

## 2018-01-01 RX ADMIN — SODIUM CHLORIDE 100 ML/HR: 900 INJECTION, SOLUTION INTRAVENOUS at 12:12

## 2018-01-01 RX ADMIN — SODIUM CHLORIDE 1000 ML: 900 INJECTION, SOLUTION INTRAVENOUS at 12:12

## 2018-01-01 RX ADMIN — CEFTRIAXONE SODIUM 1 G: 1 INJECTION, POWDER, FOR SOLUTION INTRAMUSCULAR; INTRAVENOUS at 12:13

## 2018-01-08 ENCOUNTER — HOSPITAL ENCOUNTER (EMERGENCY)
Age: 83
Discharge: HOME | End: 2018-01-08
Attending: EMERGENCY MEDICINE
Payer: MEDICARE

## 2018-01-08 ENCOUNTER — APPOINTMENT (OUTPATIENT)
Dept: CT IMAGING | Age: 83
End: 2018-01-08
Attending: EMERGENCY MEDICINE
Payer: MEDICARE

## 2018-01-08 VITALS
RESPIRATION RATE: 21 BRPM | SYSTOLIC BLOOD PRESSURE: 157 MMHG | OXYGEN SATURATION: 99 % | DIASTOLIC BLOOD PRESSURE: 87 MMHG | TEMPERATURE: 98.7 F | HEART RATE: 73 BPM

## 2018-01-08 DIAGNOSIS — R53.81 PHYSICAL DECONDITIONING: ICD-10-CM

## 2018-01-08 DIAGNOSIS — R42 VERTIGO: Primary | ICD-10-CM

## 2018-01-08 LAB
ALBUMIN SERPL-MCNC: 3.2 G/DL (ref 3.4–5)
ALBUMIN/GLOB SERPL: 0.5 {RATIO} (ref 0.8–1.7)
ALP SERPL-CCNC: 77 U/L (ref 45–117)
ALT SERPL-CCNC: 15 U/L (ref 16–61)
ANION GAP SERPL CALC-SCNC: 10 MMOL/L (ref 3–18)
AST SERPL-CCNC: 16 U/L (ref 15–37)
ATRIAL RATE: 82 BPM
BASOPHILS # BLD: 0 K/UL (ref 0–0.1)
BASOPHILS NFR BLD: 0 % (ref 0–2)
BILIRUB SERPL-MCNC: 0.7 MG/DL (ref 0.2–1)
BUN SERPL-MCNC: 22 MG/DL (ref 7–18)
BUN/CREAT SERPL: 23 (ref 12–20)
CALCIUM SERPL-MCNC: 9.4 MG/DL (ref 8.5–10.1)
CALCULATED P AXIS, ECG09: 64 DEGREES
CALCULATED R AXIS, ECG10: 50 DEGREES
CALCULATED T AXIS, ECG11: 63 DEGREES
CHLORIDE SERPL-SCNC: 106 MMOL/L (ref 100–108)
CO2 SERPL-SCNC: 25 MMOL/L (ref 21–32)
CREAT SERPL-MCNC: 0.95 MG/DL (ref 0.6–1.3)
DIAGNOSIS, 93000: NORMAL
DIFFERENTIAL METHOD BLD: ABNORMAL
EOSINOPHIL # BLD: 0 K/UL (ref 0–0.4)
EOSINOPHIL NFR BLD: 0 % (ref 0–5)
ERYTHROCYTE [DISTWIDTH] IN BLOOD BY AUTOMATED COUNT: 13 % (ref 11.6–14.5)
GLOBULIN SER CALC-MCNC: 6.9 G/DL (ref 2–4)
GLUCOSE BLD STRIP.AUTO-MCNC: 85 MG/DL (ref 70–110)
GLUCOSE SERPL-MCNC: 77 MG/DL (ref 74–99)
HCT VFR BLD AUTO: 39.2 % (ref 36–48)
HGB BLD-MCNC: 12.8 G/DL (ref 13–16)
LYMPHOCYTES # BLD: 0.8 K/UL (ref 0.9–3.6)
LYMPHOCYTES NFR BLD: 12 % (ref 21–52)
MAGNESIUM SERPL-MCNC: 2.1 MG/DL (ref 1.6–2.6)
MCH RBC QN AUTO: 28.1 PG (ref 24–34)
MCHC RBC AUTO-ENTMCNC: 32.7 G/DL (ref 31–37)
MCV RBC AUTO: 86 FL (ref 74–97)
MONOCYTES # BLD: 0.3 K/UL (ref 0.05–1.2)
MONOCYTES NFR BLD: 5 % (ref 3–10)
NEUTS SEG # BLD: 5.4 K/UL (ref 1.8–8)
NEUTS SEG NFR BLD: 83 % (ref 40–73)
P-R INTERVAL, ECG05: 174 MS
PLATELET # BLD AUTO: 166 K/UL (ref 135–420)
PMV BLD AUTO: 11 FL (ref 9.2–11.8)
POTASSIUM SERPL-SCNC: 3.7 MMOL/L (ref 3.5–5.5)
PROT SERPL-MCNC: 10.1 G/DL (ref 6.4–8.2)
Q-T INTERVAL, ECG07: 374 MS
QRS DURATION, ECG06: 90 MS
QTC CALCULATION (BEZET), ECG08: 436 MS
RBC # BLD AUTO: 4.56 M/UL (ref 4.7–5.5)
SODIUM SERPL-SCNC: 141 MMOL/L (ref 136–145)
VENTRICULAR RATE, ECG03: 82 BPM
WBC # BLD AUTO: 6.5 K/UL (ref 4.6–13.2)

## 2018-01-08 PROCEDURE — 93005 ELECTROCARDIOGRAM TRACING: CPT

## 2018-01-08 PROCEDURE — 96361 HYDRATE IV INFUSION ADD-ON: CPT

## 2018-01-08 PROCEDURE — 74011250636 HC RX REV CODE- 250/636: Performed by: EMERGENCY MEDICINE

## 2018-01-08 PROCEDURE — 83735 ASSAY OF MAGNESIUM: CPT | Performed by: EMERGENCY MEDICINE

## 2018-01-08 PROCEDURE — 96360 HYDRATION IV INFUSION INIT: CPT

## 2018-01-08 PROCEDURE — 99285 EMERGENCY DEPT VISIT HI MDM: CPT

## 2018-01-08 PROCEDURE — 80053 COMPREHEN METABOLIC PANEL: CPT | Performed by: EMERGENCY MEDICINE

## 2018-01-08 PROCEDURE — 85025 COMPLETE CBC W/AUTO DIFF WBC: CPT | Performed by: EMERGENCY MEDICINE

## 2018-01-08 PROCEDURE — 70450 CT HEAD/BRAIN W/O DYE: CPT

## 2018-01-08 PROCEDURE — 82962 GLUCOSE BLOOD TEST: CPT

## 2018-01-08 RX ORDER — MECLIZINE HCL 12.5 MG 12.5 MG/1
12.5 TABLET ORAL
Qty: 21 TAB | Refills: 0 | Status: SHIPPED | OUTPATIENT
Start: 2018-01-08 | End: 2018-01-18

## 2018-01-08 RX ORDER — MECLIZINE HYDROCHLORIDE 25 MG/1
25 TABLET ORAL
Qty: 9 TAB | Refills: 0 | Status: SHIPPED | OUTPATIENT
Start: 2018-01-08

## 2018-01-08 RX ORDER — MECLIZINE HCL 12.5 MG 12.5 MG/1
25 TABLET ORAL ONCE
Status: COMPLETED | OUTPATIENT
Start: 2018-01-08 | End: 2018-01-08

## 2018-01-08 RX ADMIN — MECLIZINE 25 MG: 12.5 TABLET ORAL at 19:19

## 2018-01-08 RX ADMIN — SODIUM CHLORIDE 1000 ML: 900 INJECTION, SOLUTION INTRAVENOUS at 15:00

## 2018-01-08 NOTE — ED TRIAGE NOTES
Pt BIBA for dizziness and incontinence of urine and decreased appetite, pt at baseline with dementia, no s/s of acute cardiac or respiratory distress, unable to ambulate, no meds given in route

## 2018-01-08 NOTE — ED PROVIDER NOTES
EMERGENCY DEPARTMENT HISTORY AND PHYSICAL EXAM    1:41 PM      Date: 1/8/2018  Patient Name: Grace Boyle    History of Presenting Illness     Chief Complaint   Patient presents with    Dizziness         History Provided By: Patient    Chief Complaint: Dizziness   Duration:  Days  Timing:  Constant  Location: n/a  Quality: n/a   Severity: severe   Modifying Factors: improves with closing his eyes. Associated Symptoms: lack of appetite, nausea      Additional History (Context): Grace Boyle is a 81 y.o. male with dementia, hypertension, vertigo, CKD, and other noted PMHx who presents to the ED via EMS with his family complaining of severe episodic dizziness that began 4 days ago. The pt is also complaining of associated a lack of appetite due to dizziness and associated nausea. Family reports that the pt has not been able to get up out of bed because of his dizziness. The pt notes that when sits up the room begins to spin. Family reports a h/o vertigo in the pt but does not know if he has ever taken medications in the past for it. They also states that the pt drinks a large amount of ginger ale and not very much water and are concerned about his diet. The pt denies V, blurry vision, HA, ad pain, fever, and rash. No other complaints or concerns at this time. History limited due to dementia. PCP: Viry Hirsch MD    Current Facility-Administered Medications   Medication Dose Route Frequency Provider Last Rate Last Dose    meclizine (ANTIVERT) tablet 25 mg  25 mg Oral ONCE Virgilio García MD         Current Outpatient Prescriptions   Medication Sig Dispense Refill    meclizine (ANTIVERT) 12.5 mg tablet Take 1 Tab by mouth three (3) times daily as needed for up to 10 days.  21 Tab 0    metFORMIN ER (GLUCOPHAGE XR) 500 mg tablet take 1 tablet by mouth once daily WITH DINNER 30 Tab 5    amLODIPine-benazepril (LOTREL) 10-20 mg per capsule take 1 capsule by mouth once daily 30 Cap 5    simvastatin (ZOCOR) 40 mg tablet take 1 tablet by mouth EVERYNIGHT AT BEDTIME 30 Tab 5    aspirin (ASPIRIN) 325 mg tablet Take 325 mg by mouth daily. Past History     Past Medical History:  Past Medical History:   Diagnosis Date    CKD (chronic kidney disease) stage 2, GFR 60-89 ml/min 3/29/2010    Dementia     High blood cholesterol level 3/29/2010    HTN (hypertension), benign 3/29/2010    Hyperglycemia 3/29/2010    Microalbuminuria 12/14/2010    Vertigo 3/29/2010       Past Surgical History:  History reviewed. No pertinent surgical history. Family History:  History reviewed. No pertinent family history. Social History:  Social History   Substance Use Topics    Smoking status: Never Smoker    Smokeless tobacco: Never Used    Alcohol use No       Allergies:  No Known Allergies      Review of Systems       Review of Systems   Constitutional: Negative for fever. Respiratory: Negative for cough and shortness of breath. Cardiovascular: Negative for chest pain and leg swelling. Gastrointestinal: Negative for abdominal pain. Musculoskeletal: Negative. Neurological: Positive for dizziness. Negative for headaches. All other systems reviewed and are negative. Physical Exam     Visit Vitals    /87    Pulse 73    Temp 98.7 °F (37.1 °C)    Resp 21    SpO2 99%         Physical Exam   HENT:   Head: Atraumatic. Eyes: Conjunctivae are normal. Pupils are equal, round, and reactive to light. Right eye exhibits nystagmus (horizontal). Left eye exhibits nystagmus (horizontal). Neck: Neck supple. No JVD present. Cardiovascular: Normal rate and regular rhythm. Pulmonary/Chest: Effort normal and breath sounds normal. No respiratory distress. He exhibits no tenderness. Abdominal: Soft. Bowel sounds are normal. He exhibits no distension. There is no tenderness. There is no rebound and no guarding. Musculoskeletal: He exhibits no edema, tenderness or deformity.    Neurological: He is alert. No cranial nerve deficit or sensory deficit. Strength 5/5 in all extremities, no clonus or pronator drift. Normal finger to nose. Skin: Skin is warm and dry. Psychiatric: He has a normal mood and affect. Nursing note and vitals reviewed. Diagnostic Study Results     Labs -  Recent Results (from the past 12 hour(s))   EKG, 12 LEAD, INITIAL    Collection Time: 01/08/18  2:12 PM   Result Value Ref Range    Ventricular Rate 82 BPM    Atrial Rate 82 BPM    P-R Interval 174 ms    QRS Duration 90 ms    Q-T Interval 374 ms    QTC Calculation (Bezet) 436 ms    Calculated P Axis 64 degrees    Calculated R Axis 50 degrees    Calculated T Axis 63 degrees    Diagnosis       Sinus rhythm  Normal ECG  No previous ECGs available  Confirmed by Bryant Reyes ([id number]) on 1/8/2018 5:35:36 PM     GLUCOSE, POC    Collection Time: 01/08/18  2:14 PM   Result Value Ref Range    Glucose (POC) 85 70 - 110 mg/dL   CBC WITH AUTOMATED DIFF    Collection Time: 01/08/18  3:30 PM   Result Value Ref Range    WBC 6.5 4.6 - 13.2 K/uL    RBC 4.56 (L) 4.70 - 5.50 M/uL    HGB 12.8 (L) 13.0 - 16.0 g/dL    HCT 39.2 36.0 - 48.0 %    MCV 86.0 74.0 - 97.0 FL    MCH 28.1 24.0 - 34.0 PG    MCHC 32.7 31.0 - 37.0 g/dL    RDW 13.0 11.6 - 14.5 %    PLATELET 285 039 - 650 K/uL    MPV 11.0 9.2 - 11.8 FL    NEUTROPHILS 83 (H) 40 - 73 %    LYMPHOCYTES 12 (L) 21 - 52 %    MONOCYTES 5 3 - 10 %    EOSINOPHILS 0 0 - 5 %    BASOPHILS 0 0 - 2 %    ABS. NEUTROPHILS 5.4 1.8 - 8.0 K/UL    ABS. LYMPHOCYTES 0.8 (L) 0.9 - 3.6 K/UL    ABS. MONOCYTES 0.3 0.05 - 1.2 K/UL    ABS. EOSINOPHILS 0.0 0.0 - 0.4 K/UL    ABS.  BASOPHILS 0.0 0.0 - 0.1 K/UL    DF AUTOMATED     METABOLIC PANEL, COMPREHENSIVE    Collection Time: 01/08/18  3:30 PM   Result Value Ref Range    Sodium 141 136 - 145 mmol/L    Potassium 3.7 3.5 - 5.5 mmol/L    Chloride 106 100 - 108 mmol/L    CO2 25 21 - 32 mmol/L    Anion gap 10 3.0 - 18 mmol/L    Glucose 77 74 - 99 mg/dL    BUN 22 (H) 7.0 - 18 MG/DL    Creatinine 0.95 0.6 - 1.3 MG/DL    BUN/Creatinine ratio 23 (H) 12 - 20      GFR est AA >60 >60 ml/min/1.73m2    GFR est non-AA >60 >60 ml/min/1.73m2    Calcium 9.4 8.5 - 10.1 MG/DL    Bilirubin, total 0.7 0.2 - 1.0 MG/DL    ALT (SGPT) 15 (L) 16 - 61 U/L    AST (SGOT) 16 15 - 37 U/L    Alk. phosphatase 77 45 - 117 U/L    Protein, total 10.1 (H) 6.4 - 8.2 g/dL    Albumin 3.2 (L) 3.4 - 5.0 g/dL    Globulin 6.9 (H) 2.0 - 4.0 g/dL    A-G Ratio 0.5 (L) 0.8 - 1.7     MAGNESIUM    Collection Time: 01/08/18  3:30 PM   Result Value Ref Range    Magnesium 2.1 1.6 - 2.6 mg/dL       Radiologic Studies -   CT HEAD WO CONT   Final Result   IMPRESSION:       1. No acute intracranial findings. 2.  Mild sequela of chronic microvascular ischemic disease, small remote  bilateral basal ganglia infarcts and atherosclerosis. 3.  Mild generalized volume loss. Medical Decision Making   I am the first provider for this patient. I reviewed the vital signs, available nursing notes, past medical history, past surgical history, family history and social history. Vital Signs-Reviewed the patient's vital signs. EKG: Interpreted by the EP. Time Interpreted: 1414   Rate: 82   Rhythm: Sinus Rhythm    Interpretation: normal axis, no STEMI   Comparison:       Provider Notes (Medical Decision Making):   Pt presenting with signs and symptoms c/w peripheral vertigo. Has h/o same per chart review and wife at bedside. On initial exam pt denying any symptoms but apprehensive to sit up. Labs and imaging obtained and pt given fluid bolus. I have discussed results of work up with patient and family at bedside. Discussed plan of discharge with pt and family however prior to discharge vertigo symptoms returned. Meclizine ordered and care signed over to Dr Waldemar Richards for reevaluation and final disposition. Diagnosis     Clinical Impression:   1. Vertigo    2.  Physical deconditioning        Disposition: pending    Follow-up Information     None           Patient's Medications   Start Taking    MECLIZINE (ANTIVERT) 12.5 MG TABLET    Take 1 Tab by mouth three (3) times daily as needed for up to 10 days. Continue Taking    AMLODIPINE-BENAZEPRIL (LOTREL) 10-20 MG PER CAPSULE    take 1 capsule by mouth once daily    ASPIRIN (ASPIRIN) 325 MG TABLET    Take 325 mg by mouth daily. METFORMIN ER (GLUCOPHAGE XR) 500 MG TABLET    take 1 tablet by mouth once daily WITH DINNER    SIMVASTATIN (ZOCOR) 40 MG TABLET    take 1 tablet by mouth EVERYNIGHT AT BEDTIME   These Medications have changed    No medications on file   Stop Taking    No medications on file     _______________________________    Attestations:  [employer] acting as a scribe for and in the presence of Kathia Tilley MD      January 08, 2018 at 1:41 PM       Provider Attestation:      I personally performed the services described in the documentation, reviewed the documentation, as recorded by the scribe in my presence, and it accurately and completely records my words and actions.  January 08, 2018 at 1:41 PM - Kathia Tilley MD    _______________________________

## 2018-01-09 NOTE — ED NOTES
ADDENDUM: Patient care transferred to myself from Dr. Nik Padron pending trial of ambulation. Patient has a hx of intermittent peripheral vertigo and has been c/o intermittent vertigo \"spinning\" sensation. No vomiting, but some nausea. Worse with movement. Denies head trauma, HA, visual changes or syncope. Family reports that he has been lying around in bed more the last few days and feel that he has undiagnosed dementia because he has trouble remembering. Patient received the Antivert and was then able to get up with minimal assistance. He ambulated to the bathroom and back. No ataxia. Normal finger to nose and normal rapid alternating movements. Head CT done prior to my eval and no acute abnormalities. Suspect peripheral vertigo and deconditioning. Discussed the importance of close follow up with patient and wife and son as well as the importance of close follow up with PCP in the next two days. Gave strict return precautions for worsening symptoms.

## 2018-01-09 NOTE — ED NOTES
I have reviewed discharge instructions with the patient. The patient verbalized understanding. Patient armband removed and shredded. Patient leaving the ED in stable condition at this time.

## 2018-01-10 ENCOUNTER — PATIENT OUTREACH (OUTPATIENT)
Dept: FAMILY MEDICINE CLINIC | Age: 83
End: 2018-01-10

## 2018-01-10 NOTE — PROGRESS NOTES
Goals        Post ED     Establish PCP relationships and regularly scheduled appointments. Patient admitted to [facility] ED, to 1/8/18 for dizziness. Presenting symptoms:   Dizziness when sitting up or standing  New medications/changes to current medications:  Meclizine  ED utilization in past 6 months: 0    Contacted patient's son Eder Villalobos for ED follow up. Verified 2 patient identifiers. Introduced self, role and reason for call. Son reports:  Patient has been sleeping a lot. Son denies:  Dizziness since ED visit  ADL's:  Feeds self: independently  Ambulates: with assist    Self grooming: with assist  Toileting: independently    DME:   Cane and walker  Support:   Wife and children    Educated son to monitor and report any further dizziness and how many times patient has to take Meclizine. Son verbalized understanding of information discussed and is aware of  when to seek medical attention from PCP, urgent care or ED. Reviewed new medications or changes to previous medications and allergies reviewed. Instructed to bring all medications or list of medications with patient to next appointment. Opportunity to ask questions was provided. Contact information was provided for future reference or further questions. Appointment(s):  Dr. Nino Estevez on 1/15/17 at 66   Son will provide transportation. Will continue to follow.

## 2018-01-15 ENCOUNTER — HOME HEALTH ADMISSION (OUTPATIENT)
Dept: HOME HEALTH SERVICES | Facility: HOME HEALTH | Age: 83
End: 2018-01-15
Payer: MEDICARE

## 2018-01-15 ENCOUNTER — OFFICE VISIT (OUTPATIENT)
Dept: FAMILY MEDICINE CLINIC | Age: 83
End: 2018-01-15

## 2018-01-15 VITALS
RESPIRATION RATE: 18 BRPM | SYSTOLIC BLOOD PRESSURE: 154 MMHG | TEMPERATURE: 98.3 F | HEART RATE: 81 BPM | DIASTOLIC BLOOD PRESSURE: 96 MMHG | OXYGEN SATURATION: 95 % | WEIGHT: 161 LBS | BODY MASS INDEX: 23.05 KG/M2 | HEIGHT: 70 IN

## 2018-01-15 DIAGNOSIS — E11.29 CONTROLLED TYPE 2 DIABETES MELLITUS WITH MICROALBUMINURIA, WITHOUT LONG-TERM CURRENT USE OF INSULIN (HCC): Primary | ICD-10-CM

## 2018-01-15 DIAGNOSIS — G30.1 LATE ONSET ALZHEIMER'S DISEASE WITHOUT BEHAVIORAL DISTURBANCE (HCC): ICD-10-CM

## 2018-01-15 DIAGNOSIS — I10 HTN (HYPERTENSION), BENIGN: ICD-10-CM

## 2018-01-15 DIAGNOSIS — F02.80 LATE ONSET ALZHEIMER'S DISEASE WITHOUT BEHAVIORAL DISTURBANCE (HCC): ICD-10-CM

## 2018-01-15 DIAGNOSIS — R29.898 MUSCULAR DECONDITIONING: ICD-10-CM

## 2018-01-15 DIAGNOSIS — R80.9 CONTROLLED TYPE 2 DIABETES MELLITUS WITH MICROALBUMINURIA, WITHOUT LONG-TERM CURRENT USE OF INSULIN (HCC): Primary | ICD-10-CM

## 2018-01-15 DIAGNOSIS — E78.00 HIGH BLOOD CHOLESTEROL LEVEL: ICD-10-CM

## 2018-01-15 RX ORDER — METFORMIN HYDROCHLORIDE 500 MG/1
TABLET, EXTENDED RELEASE ORAL
Qty: 30 TAB | Refills: 5 | Status: CANCELLED | OUTPATIENT
Start: 2018-01-15

## 2018-01-15 RX ORDER — AMLODIPINE AND BENAZEPRIL HYDROCHLORIDE 10; 20 MG/1; MG/1
CAPSULE ORAL
Qty: 30 CAP | Refills: 5 | Status: CANCELLED | OUTPATIENT
Start: 2018-01-15

## 2018-01-15 NOTE — PROGRESS NOTES
Patient is in the office today for a ED follow up. Son states patient was seen for Vertigo. 1. Have you been to the ER, urgent care clinic since your last visit? Hospitalized since your last visit? yes, [facility].    2. Have you seen or consulted any other health care providers outside of the [address] [place] [address] since your last visit? Include any pap smears or colon screening.  No

## 2018-01-15 NOTE — PATIENT INSTRUCTIONS
Dizziness: Care Instructions  Your Care Instructions  Dizziness is the feeling of unsteadiness or fuzziness in your head. It is different than having vertigo, which is a feeling that the room is spinning or that you are moving or falling. It is also different from lightheadedness, which is the feeling that you are about to faint. It can be hard to know what causes dizziness. Some people feel dizzy when they have migraine headaches. Sometimes bouts of flu can make you feel dizzy. Some medical conditions, such as heart problems or high blood pressure, can make you feel dizzy. Many medicines can cause dizziness, including medicines for high blood pressure, pain, or anxiety. If a medicine causes your symptoms, your doctor may recommend that you stop or change the medicine. If it is a problem with your heart, you may need medicine to help your heart work better. If there is no clear reason for your symptoms, your doctor may suggest watching and waiting for a while to see if the dizziness goes away on its own. Follow-up care is a key part of your treatment and safety. Be sure to make and go to all appointments, and call your doctor if you are having problems. It's also a good idea to know your test results and keep a list of the medicines you take. How can you care for yourself at home? · If your doctor recommends or prescribes medicine, take it exactly as directed. Call your doctor if you think you are having a problem with your medicine. · Do not drive while you feel dizzy. · Try to prevent falls. Steps you can take include:  ¨ Using nonskid mats, adding grab bars near the tub, and using night-lights. ¨ Clearing your home so that walkways are free of anything you might trip on. ¨ Letting family and friends know that you have been feeling dizzy. This will help them know how to help you. When should you call for help? Call 911 anytime you think you may need emergency care.  For example, call if:  ? · You passed out (lost consciousness). ? · You have dizziness along with symptoms of a heart attack. These may include:  ¨ Chest pain or pressure, or a strange feeling in the chest.  ¨ Sweating. ¨ Shortness of breath. ¨ Nausea or vomiting. ¨ Pain, pressure, or a strange feeling in the back, neck, jaw, or upper belly or in one or both shoulders or arms. ¨ Lightheadedness or sudden weakness. ¨ A fast or irregular heartbeat. ? · You have symptoms of a stroke. These may include:  ¨ Sudden numbness, tingling, weakness, or loss of movement in your face, arm, or leg, especially on only one side of your body. ¨ Sudden vision changes. ¨ Sudden trouble speaking. ¨ Sudden confusion or trouble understanding simple statements. ¨ Sudden problems with walking or balance. ¨ A sudden, severe headache that is different from past headaches. ?Call your doctor now or seek immediate medical care if:  ? · You feel dizzy and have a fever, headache, or ringing in your ears. ? · You have new or increased nausea and vomiting. ? · Your dizziness does not go away or comes back. ? Watch closely for changes in your health, and be sure to contact your doctor if:  ? · You do not get better as expected. Where can you learn more? Go to http://gladis-bigg.info/. Enter T184 in the search box to learn more about \"Dizziness: Care Instructions. \"  Current as of: March 20, 2017  Content Version: 11.4  © 9600-5876 [employer]. Care instructions adapted under license by [employer] (which disclaims liability or warranty for this information). If you have questions about a medical condition or this instruction, always ask your healthcare professional. [place] [address] any warranty or liability for your use of this information.

## 2018-01-15 NOTE — PROGRESS NOTES
Subjective:       Chief Complaint  The patient presents for follow up of diabetes, hypertension and high cholesterol. NEL Gracia is a 81 y.o. male seen for follow up of diabetes. Healso has hypertension and hyperlipidemia. Diabetes well controlled, no significant medication side effects noted, on metformin, not sure he is taking the med so with weight loss will stop it, hypertension borderline controlled, no significant medication side effects noted, on Lotrel, again not sure how compliant he is, his son will monitor meds from now, hyperlipidemia well controlled, no significant medication side effects noted, on Zocor. Due to age and will go ahead and stop Zocor    Diet and Lifestyle: generally follows a low fat low cholesterol diet, follows a diabetic diet regularly, sedentary    Home BP Monitoring: is not measured at home. Diabetic Review of Systems - home glucose monitoring: is performed regularly, 1x/day. Other symptoms and concerns:  Pt with son today. According to son pt sleeps from 8PM to 2 pm and only eats one meal per day. He has lost about 40 lbs over the last year. His memory is very poor and he is not oriented to time. He has had dizziness recently due to vertigo and had CT in ER that just showed small vessel ischemic changes. Pt is very unsteady on his feet due to muscular deconditioning. Family also needs  to help with disposition since it is more difficult to take care of patient at home. Will set pt up for Home Health to help with the issues above. It is a taking effort for son to get pt ready to leave the home. He is incontinent of urine.            Current Outpatient Prescriptions   Medication Sig    metFORMIN ER (GLUCOPHAGE XR) 500 mg tablet take 1 tablet by mouth once daily WITH DINNER    amLODIPine-benazepril (LOTREL) 10-20 mg per capsule take 1 capsule by mouth once daily    simvastatin (ZOCOR) 40 mg tablet take 1 tablet by mouth EVERYNIGHT AT BEDTIME    aspirin (ASPIRIN) 325 mg tablet Take 325 mg by mouth daily.  meclizine (ANTIVERT) 12.5 mg tablet Take 1 Tab by mouth three (3) times daily as needed for up to 10 days.  meclizine (ANTIVERT) 25 mg tablet Take 1 Tab by mouth three (3) times daily as needed for up to 3 doses. No current facility-administered medications for this visit. Review of Systems  Respiratory: negative for dyspnea on exertion  Cardiovascular: negative for chest pain    Objective:     Visit Vitals    BP (!) 154/96 (BP 1 Location: Left arm, BP Patient Position: Sitting)    Pulse 81    Temp 98.3 °F (36.8 °C) (Oral)    Resp 18    Ht 5' 10\" (1.778 m)    Wt 161 lb (73 kg)    SpO2 95%    BMI 23.1 kg/m2        General appearance - alert, well appearing, and in no distress  Neck - supple, no significant adenopathy, carotids upstroke normal bilaterally, no bruits  Chest - clear to auscultation, no wheezes, rales or rhonchi, symmetric air entry  Heart - normal rate, regular rhythm, normal S1, S2, no murmurs, rubs, clicks or gallops  Extremities - peripheral pulses normal, no pedal edema, no clubbing or cyanosis  Neuro; pt unsteady on feet getting up to the exam table. Labs:   Lab Results   Component Value Date/Time    Hemoglobin A1c 5.8 12/12/2017 10:45 AM    Hemoglobin A1c 6.3 08/07/2017 08:32 AM    Hemoglobin A1c 6.0 04/28/2017 07:57 AM    Microalbumin/Creat ratio (mg/g creat) 111 12/12/2017 10:45 AM    Microalbumin,urine random 18.60 12/12/2017 10:45 AM    LDL, calculated 49 12/12/2017 10:45 AM    Creatinine 0.95 01/08/2018 03:30 PM      Lab Results   Component Value Date/Time    Cholesterol, total 105 12/12/2017 10:45 AM    HDL Cholesterol 42 12/12/2017 10:45 AM    LDL, calculated 49 12/12/2017 10:45 AM    Triglyceride 70 12/12/2017 10:45 AM    CHOL/HDL Ratio 2.5 12/12/2017 10:45 AM     Lab Results   Component Value Date/Time    AST (SGOT) 16 01/08/2018 03:30 PM    Alk.  phosphatase 77 01/08/2018 03:30 PM     Lab Results   Component Value Date/Time    GFR est AA >60 01/08/2018 03:30 PM    GFR est non-AA >60 01/08/2018 03:30 PM    Creatinine 0.95 01/08/2018 03:30 PM    BUN 22 01/08/2018 03:30 PM    Sodium 141 01/08/2018 03:30 PM    Potassium 3.7 01/08/2018 03:30 PM    Chloride 106 01/08/2018 03:30 PM    CO2 25 01/08/2018 03:30 PM            Assessment / Plan     Diabetes well controlled, will stop metformin   Hypertension borderline controlled, will change to Lotrel 10/20 every other day to avoid hypotension with his weight loss and poor appetite   Hyperlipidemia well controlled, will stop ZOcor. ICD-10-CM ICD-9-CM    1. Controlled type 2 diabetes mellitus with microalbuminuria, without long-term current use of insulin (HCC) E11.29 250.40 HEMOGLOBIN A1C W/O EAG    R80.9 791.0    2. HTN (hypertension), benign X68 342.9 METABOLIC PANEL, COMPREHENSIVE   3. High blood cholesterol level E78.00 272.0 LIPID PANEL   4. Muscular deconditioning R29.898 781.99 REFERRAL TO HOME HEALTH for PT    5. Late onset Alzheimer's disease without behavioral disturbance G30.1 331.0 Refer to [address] [place] [address] Ethan Leblanc for safety eval and . F02.80 294.10                 Diabetic issues reviewed with him: diabetic diet discussed in detail, and low cholesterol diet, weight control and daily exercise discussed. Follow-up Disposition:  Return in about 3 months (around 4/15/2018) for labs 1 week before. Reviewed plan of care. Patient has provided input and agrees with goals.

## 2018-01-15 NOTE — MR AVS SNAPSHOT
[address] [place] [address] 
 
 
 [address] [address] [address] [facility] [address] 
[phone] Patient: Liyah Salamanca MRN: [id number] Jose De Jesus Ernandez Visit Information Date & Time Provider Department Dept. Phone Encounter #  
 1/15/2018 11:45 AM Avery Fernandez[address] [phone] [id number] Follow-up Instructions Return in about 3 months (around 4/15/2018) for labs 1 week before. Upcoming Health Maintenance Date Due DTaP/Tdap/Td series (1 - Tdap) 8/18/1957 ZOSTER VACCINE AGE 60> 6/18/1996 GLAUCOMA SCREENING Q2Y 8/18/2001 Influenza Age 5 to Adult 8/1/2017 Pneumococcal 65+ Low/Medium Risk (1 of 2 - PCV13) 1/22/2018* FOOT EXAM Q1 1/22/2018* EYE EXAM RETINAL OR DILATED Q1 1/22/2018* MEDICARE YEARLY EXAM 5/16/2018 HEMOGLOBIN A1C Q6M 6/12/2018 MICROALBUMIN Q1 12/12/2018 LIPID PANEL Q1 12/12/2018 *Topic was postponed. The date shown is not the original due date. Allergies as of 1/15/2018  Review Complete On: 1/15/2018 By: Bela Ardon LPN No Known Allergies Current Immunizations  Never Reviewed No immunizations on file. Not reviewed this visit You Were Diagnosed With   
  
 Codes Comments Controlled type 2 diabetes mellitus with microalbuminuria, without long-term current use of insulin (HCC)    -  Primary ICD-10-CM: E11.29, R80.9 ICD-9-CM: 250.40, 791.0   
 HTN (hypertension), benign     ICD-10-CM: I10 
ICD-9-CM: 401.1 High blood cholesterol level     ICD-10-CM: E78.00 ICD-9-CM: 272.0 Muscular deconditioning     ICD-10-CM: R29.898 ICD-9-CM: 781.99 Vitals BP Pulse Temp Resp Height(growth percentile) Weight(growth percentile) (!) 154/96 (BP 1 Location: Left arm, BP Patient Position: Sitting) 81 98.3 °F (36.8 °C) (Oral) 18 5' 10\" (1.778 m) 161 lb (73 kg) SpO2 BMI Smoking Status 95% 23.1 kg/m2 Never Smoker Vitals History BMI and BSA Data Body Mass Index Body Surface Area  
 23.1 kg/m 2 1.9 m 2 [facility] [address] [phone] Your Updated Medication List  
  
   
This list is accurate as of: 1/15/18 12:24 PM.  Always use your most recent med list. amLODIPine-benazepril 10-20 mg per capsule Commonly known as:  LOTREL  
take 1 capsule by mouth once daily  
  
 aspirin 325 mg tablet Commonly known as:  ASPIRIN Take 325 mg by mouth daily. * meclizine 12.5 mg tablet Commonly known as:  ANTIVERT Take 1 Tab by mouth three (3) times daily as needed for up to 10 days. * meclizine 25 mg tablet Commonly known as:  ANTIVERT Take 1 Tab by mouth three (3) times daily as needed for up to 3 doses. metFORMIN  mg tablet Commonly known as:  GLUCOPHAGE XR  
take 1 tablet by mouth once daily WITH DINNER  
  
 simvastatin 40 mg tablet Commonly known as:  ZOCOR  
take 1 tablet by mouth EVERYNIGHT AT BEDTIME  
  
 * Notice: This list has 2 medication(s) that are the same as other medications prescribed for you. Read the directions carefully, and ask your doctor or other care provider to review them with you. We Performed the Following [address] Comments:  
 Ref to [address] [facility] for PT and SN for Dementia and muscular deconditioning [phone] Follow-up Instructions Return in about 3 months (around 4/15/2018) for labs 1 week before. Referral Information Referral ID Referred By Referred To  
  
 [id number] JOSE GUEVARA Not Available Visits Status Start Date End Date 1 New Request 1/15/18 1/15/19 If your referral has a status of pending review or denied, additional information will be sent to support the outcome of this decision. Patient Instructions Dizziness: Care Instructions Your Care Instructions Dizziness is the feeling of unsteadiness or fuzziness in your head. It is different than having vertigo, which is a feeling that the room is spinning or that you are moving or falling. It is also different from lightheadedness, which is the feeling that you are about to faint. It can be hard to know what causes dizziness. Some people feel dizzy when they have migraine headaches. Sometimes bouts of flu can make you feel dizzy. Some medical conditions, such as heart problems or high blood pressure, can make you feel dizzy. Many medicines can cause dizziness, including medicines for high blood pressure, pain, or anxiety. If a medicine causes your symptoms, your doctor may recommend that you stop or change the medicine. If it is a problem with your heart, you may need medicine to help your heart work better. If there is no clear reason for your symptoms, your doctor may suggest watching and waiting for a while to see if the dizziness goes away on its own. Follow-up care is a key part of your treatment and safety. Be sure to make and go to all appointments, and call your doctor if you are having problems. It's also a good idea to know your test results and keep a list of the medicines you take. How can you care for yourself at home? · If your doctor recommends or prescribes medicine, take it exactly as directed. Call your doctor if you think you are having a problem with your medicine. · Do not drive while you feel dizzy. · Try to prevent falls. Steps you can take include: ¨ Using nonskid mats, adding grab bars near the tub, and using night-lights. ¨ Clearing your home so that walkways are free of anything you might trip on. ¨ Letting family and friends know that you have been feeling dizzy. This will help them know how to help you. When should you call for help? Call 911 anytime you think you may need emergency care. For example, call if: 
? · You passed out (lost consciousness). ? · You have dizziness along with symptoms of a heart attack. These may include: ¨ Chest pain or pressure, or a strange feeling in the chest. 
¨ Sweating. ¨ Shortness of breath. ¨ Nausea or vomiting. ¨ Pain, pressure, or a strange feeling in the back, neck, jaw, or upper belly or in one or both shoulders or arms. ¨ Lightheadedness or sudden weakness. ¨ A fast or irregular heartbeat. ? · You have symptoms of a stroke. These may include: 
¨ Sudden numbness, tingling, weakness, or loss of movement in your face, arm, or leg, especially on only one side of your body. ¨ Sudden vision changes. ¨ Sudden trouble speaking. ¨ Sudden confusion or trouble understanding simple statements. ¨ Sudden problems with walking or balance. ¨ A sudden, severe headache that is different from past headaches. ?Call your doctor now or seek immediate medical care if: 
? · You feel dizzy and have a fever, headache, or ringing in your ears. ? · You have new or increased nausea and vomiting. ? · Your dizziness does not go away or comes back. ? Watch closely for changes in your health, and be sure to contact your doctor if: 
? · You do not get better as expected. Where can you learn more? Go to http://gladis-bigg.info/. Enter D263 in the search box to learn more about \"Dizziness: Care Instructions. \" Current as of: March 20, 2017 Content Version: 11.4 © 9244-6673 Qyer.com. Care instructions adapted under license by [employer] (which disclaims liability or warranty for this information). If you have questions about a medical condition or this instruction, always ask your healthcare professional. [address] any warranty or liability for your use of this information. Introducing [facility] & HEALTH SERVICES!    
 [facility] introduces DEQ patient portal. Now you can access parts of your medical record, email your doctor's office, and request medication refills online. 1. In your internet browser, go to [email or web address] 2. Click on the First Time User? Click Here link in the Sign In box. You will see the New Member Sign Up page. 3. Enter your TrackingPoint Access Code exactly as it appears below. You will not need to use this code after youve completed the sign-up process. If you do not sign up before the expiration date, you must request a new code. · TrackingPoint Access Code: [email or web address] Expires: 4/8/2018  8:25 PM 
 
4. Enter the last four digits of your Social Security Number (xxxx) and Date of Birth (mm/dd/yyyy) as indicated and click Submit. You will be taken to the next sign-up page. 5. Create a TrackingPoint ID. This will be your TrackingPoint login ID and cannot be changed, so think of one that is secure and easy to remember. 6. Create a TrackingPoint password. You can change your password at any time. 7. Enter your Password Reset Question and Answer. This can be used at a later time if you forget your password. 8. Enter your e-mail address. You will receive e-mail notification when new information is available in [address] 9. Click Sign Up. You can now view and download portions of your medical record. 10. Click the Download Summary menu link to download a portable copy of your medical information. If you have questions, please visit the Frequently Asked Questions section of the TrackingPoint website. Remember, TrackingPoint is NOT to be used for urgent needs. For medical emergencies, dial 911. Now available from your iPhone and Android! Please provide this summary of care documentation to your next provider. Your primary care clinician is listed as JOSE GUEVARA. If you have any questions after today's visit, please call [phone].

## 2018-01-16 ENCOUNTER — PATIENT OUTREACH (OUTPATIENT)
Dept: FAMILY MEDICINE CLINIC | Age: 83
End: 2018-01-16

## 2018-01-16 ENCOUNTER — HOME CARE VISIT (OUTPATIENT)
Dept: HOME HEALTH SERVICES | Facility: HOME HEALTH | Age: 83
End: 2018-01-16

## 2018-01-16 ENCOUNTER — HOME CARE VISIT (OUTPATIENT)
Dept: SCHEDULING | Facility: HOME HEALTH | Age: 83
End: 2018-01-16
Payer: MEDICARE

## 2018-01-16 PROCEDURE — G0151 HHCP-SERV OF PT,EA 15 MIN: HCPCS

## 2018-01-16 PROCEDURE — 3331090002 HH PPS REVENUE DEBIT

## 2018-01-16 PROCEDURE — 3331090001 HH PPS REVENUE CREDIT

## 2018-01-16 PROCEDURE — G0299 HHS/HOSPICE OF RN EA 15 MIN: HCPCS

## 2018-01-16 PROCEDURE — 400013 HH SOC

## 2018-01-16 NOTE — PROGRESS NOTES
Attempted to contact patient post [facility] ED visit 1/8/18. Left voice message. Will make another attempt on tomorrow.

## 2018-01-17 ENCOUNTER — HOME CARE VISIT (OUTPATIENT)
Dept: SCHEDULING | Facility: HOME HEALTH | Age: 83
End: 2018-01-17
Payer: MEDICARE

## 2018-01-17 ENCOUNTER — PATIENT OUTREACH (OUTPATIENT)
Dept: FAMILY MEDICINE CLINIC | Age: 83
End: 2018-01-17

## 2018-01-17 VITALS
SYSTOLIC BLOOD PRESSURE: 151 MMHG | OXYGEN SATURATION: 98 % | HEART RATE: 77 BPM | TEMPERATURE: 97.3 F | DIASTOLIC BLOOD PRESSURE: 88 MMHG

## 2018-01-17 PROCEDURE — 3331090001 HH PPS REVENUE CREDIT

## 2018-01-17 PROCEDURE — G0157 HHC PT ASSISTANT EA 15: HCPCS

## 2018-01-17 PROCEDURE — 3331090002 HH PPS REVENUE DEBIT

## 2018-01-17 NOTE — PROGRESS NOTES
Patient admitted to [facility] ED1/8/18 for dizziness. Presenting symptoms:   Dizziness  Lack of appetite  vomiting  New medications/changes to current medications:  Meclizine  ED utilization in past 6 months: 1    Contacted patient for ED follow up. Verified 2 patient identifiers. Introduced self, role and reason for call. Wife reports:  Patient has not had any dizzy spells since discharge from ED  ADL's:  Feeds self: independently  Ambulates: with cane    Self grooming: needs assist  Toileting: independently    DME:   cane  Support:   Wife  Son  [address] for PT, [place]    Educated patient to monitor and report the following Red flags: dizziness, nausea & vomiting or any new or concerning symptoms. Wife verbalized understanding of information discussed and is aware of  when to seek medical attention from PCP, urgent care or ED. Reviewed new medications or changes to previous medications and allergies reviewed. Instructed to bring all medications or list of medications with him to next appointment. Opportunity to ask questions was provided. Contact information was provided for future reference or further questions. Appointment(s):  Patient was seen in our office 1/15/18  Next office visit April 2018  Encouraged wife to contact us if patient needs to be seen prior to next visit.  Wife verbalizes an understanding

## 2018-01-18 ENCOUNTER — HOME CARE VISIT (OUTPATIENT)
Dept: HOME HEALTH SERVICES | Facility: HOME HEALTH | Age: 83
End: 2018-01-18
Payer: MEDICARE

## 2018-01-18 ENCOUNTER — HOME CARE VISIT (OUTPATIENT)
Dept: SCHEDULING | Facility: HOME HEALTH | Age: 83
End: 2018-01-18
Payer: MEDICARE

## 2018-01-18 VITALS
TEMPERATURE: 98.1 F | SYSTOLIC BLOOD PRESSURE: 156 MMHG | OXYGEN SATURATION: 98 % | DIASTOLIC BLOOD PRESSURE: 90 MMHG | HEART RATE: 76 BPM

## 2018-01-18 PROCEDURE — 3331090002 HH PPS REVENUE DEBIT

## 2018-01-18 PROCEDURE — G0157 HHC PT ASSISTANT EA 15: HCPCS

## 2018-01-18 PROCEDURE — 3331090001 HH PPS REVENUE CREDIT

## 2018-01-19 ENCOUNTER — HOME CARE VISIT (OUTPATIENT)
Dept: SCHEDULING | Facility: HOME HEALTH | Age: 83
End: 2018-01-19
Payer: MEDICARE

## 2018-01-19 PROCEDURE — 3331090002 HH PPS REVENUE DEBIT

## 2018-01-19 PROCEDURE — 3331090001 HH PPS REVENUE CREDIT

## 2018-01-19 PROCEDURE — G0299 HHS/HOSPICE OF RN EA 15 MIN: HCPCS

## 2018-01-20 VITALS
RESPIRATION RATE: 18 BRPM | SYSTOLIC BLOOD PRESSURE: 130 MMHG | HEART RATE: 72 BPM | DIASTOLIC BLOOD PRESSURE: 80 MMHG | OXYGEN SATURATION: 98 % | TEMPERATURE: 99.3 F

## 2018-01-20 PROCEDURE — 3331090001 HH PPS REVENUE CREDIT

## 2018-01-20 PROCEDURE — 3331090002 HH PPS REVENUE DEBIT

## 2018-01-21 PROCEDURE — 3331090002 HH PPS REVENUE DEBIT

## 2018-01-21 PROCEDURE — 3331090001 HH PPS REVENUE CREDIT

## 2018-01-22 ENCOUNTER — HOME CARE VISIT (OUTPATIENT)
Dept: SCHEDULING | Facility: HOME HEALTH | Age: 83
End: 2018-01-22
Payer: MEDICARE

## 2018-01-22 VITALS
HEART RATE: 78 BPM | DIASTOLIC BLOOD PRESSURE: 78 MMHG | OXYGEN SATURATION: 98 % | SYSTOLIC BLOOD PRESSURE: 140 MMHG | TEMPERATURE: 98 F

## 2018-01-22 VITALS
SYSTOLIC BLOOD PRESSURE: 136 MMHG | HEART RATE: 79 BPM | RESPIRATION RATE: 20 BRPM | TEMPERATURE: 98.4 F | OXYGEN SATURATION: 98 % | DIASTOLIC BLOOD PRESSURE: 88 MMHG

## 2018-01-22 PROCEDURE — G0157 HHC PT ASSISTANT EA 15: HCPCS

## 2018-01-22 PROCEDURE — 3331090001 HH PPS REVENUE CREDIT

## 2018-01-22 PROCEDURE — 3331090002 HH PPS REVENUE DEBIT

## 2018-01-23 PROCEDURE — 3331090001 HH PPS REVENUE CREDIT

## 2018-01-23 PROCEDURE — 3331090002 HH PPS REVENUE DEBIT

## 2018-01-24 ENCOUNTER — HOME CARE VISIT (OUTPATIENT)
Dept: SCHEDULING | Facility: HOME HEALTH | Age: 83
End: 2018-01-24
Payer: MEDICARE

## 2018-01-24 VITALS
HEART RATE: 84 BPM | TEMPERATURE: 98.7 F | OXYGEN SATURATION: 98 % | DIASTOLIC BLOOD PRESSURE: 84 MMHG | SYSTOLIC BLOOD PRESSURE: 142 MMHG

## 2018-01-24 PROCEDURE — G0151 HHCP-SERV OF PT,EA 15 MIN: HCPCS

## 2018-01-24 PROCEDURE — 3331090001 HH PPS REVENUE CREDIT

## 2018-01-24 PROCEDURE — 3331090002 HH PPS REVENUE DEBIT

## 2018-01-25 PROCEDURE — 3331090001 HH PPS REVENUE CREDIT

## 2018-01-25 PROCEDURE — 3331090002 HH PPS REVENUE DEBIT

## 2018-01-26 VITALS
OXYGEN SATURATION: 97 % | SYSTOLIC BLOOD PRESSURE: 150 MMHG | HEART RATE: 74 BPM | TEMPERATURE: 96.5 F | DIASTOLIC BLOOD PRESSURE: 88 MMHG

## 2018-01-26 PROCEDURE — 3331090001 HH PPS REVENUE CREDIT

## 2018-01-26 PROCEDURE — 3331090002 HH PPS REVENUE DEBIT

## 2018-01-27 PROCEDURE — 3331090002 HH PPS REVENUE DEBIT

## 2018-01-27 PROCEDURE — 3331090001 HH PPS REVENUE CREDIT

## 2018-01-28 PROCEDURE — 3331090002 HH PPS REVENUE DEBIT

## 2018-01-28 PROCEDURE — 3331090001 HH PPS REVENUE CREDIT

## 2018-01-29 PROCEDURE — 3331090002 HH PPS REVENUE DEBIT

## 2018-01-29 PROCEDURE — 3331090001 HH PPS REVENUE CREDIT

## 2018-01-30 ENCOUNTER — HOME CARE VISIT (OUTPATIENT)
Dept: SCHEDULING | Facility: HOME HEALTH | Age: 83
End: 2018-01-30
Payer: MEDICARE

## 2018-01-30 VITALS
SYSTOLIC BLOOD PRESSURE: 122 MMHG | TEMPERATURE: 98.3 F | RESPIRATION RATE: 18 BRPM | HEART RATE: 73 BPM | DIASTOLIC BLOOD PRESSURE: 90 MMHG | OXYGEN SATURATION: 98 %

## 2018-01-30 PROCEDURE — 3331090001 HH PPS REVENUE CREDIT

## 2018-01-30 PROCEDURE — 3331090002 HH PPS REVENUE DEBIT

## 2018-01-30 PROCEDURE — G0299 HHS/HOSPICE OF RN EA 15 MIN: HCPCS

## 2018-01-31 PROCEDURE — 3331090001 HH PPS REVENUE CREDIT

## 2018-01-31 PROCEDURE — 3331090002 HH PPS REVENUE DEBIT

## 2018-02-01 PROCEDURE — 3331090001 HH PPS REVENUE CREDIT

## 2018-02-01 PROCEDURE — 3331090002 HH PPS REVENUE DEBIT

## 2018-02-02 PROCEDURE — 3331090002 HH PPS REVENUE DEBIT

## 2018-02-02 PROCEDURE — 3331090001 HH PPS REVENUE CREDIT

## 2018-02-03 ENCOUNTER — HOME CARE VISIT (OUTPATIENT)
Dept: HOME HEALTH SERVICES | Facility: HOME HEALTH | Age: 83
End: 2018-02-03
Payer: MEDICARE

## 2018-02-03 PROCEDURE — 3331090002 HH PPS REVENUE DEBIT

## 2018-02-03 PROCEDURE — 3331090001 HH PPS REVENUE CREDIT

## 2018-02-04 PROCEDURE — 3331090002 HH PPS REVENUE DEBIT

## 2018-02-04 PROCEDURE — 3331090001 HH PPS REVENUE CREDIT

## 2018-02-05 PROCEDURE — 3331090001 HH PPS REVENUE CREDIT

## 2018-02-05 PROCEDURE — 3331090002 HH PPS REVENUE DEBIT

## 2018-02-06 ENCOUNTER — HOME CARE VISIT (OUTPATIENT)
Dept: SCHEDULING | Facility: HOME HEALTH | Age: 83
End: 2018-02-06
Payer: MEDICARE

## 2018-02-06 VITALS
RESPIRATION RATE: 20 BRPM | TEMPERATURE: 97.7 F | DIASTOLIC BLOOD PRESSURE: 88 MMHG | SYSTOLIC BLOOD PRESSURE: 144 MMHG | OXYGEN SATURATION: 98 % | HEART RATE: 77 BPM

## 2018-02-06 PROCEDURE — 3331090002 HH PPS REVENUE DEBIT

## 2018-02-06 PROCEDURE — G0299 HHS/HOSPICE OF RN EA 15 MIN: HCPCS

## 2018-02-06 PROCEDURE — 3331090001 HH PPS REVENUE CREDIT

## 2018-02-07 PROCEDURE — 3331090001 HH PPS REVENUE CREDIT

## 2018-02-07 PROCEDURE — 3331090002 HH PPS REVENUE DEBIT

## 2018-04-16 NOTE — PROGRESS NOTES
Patient is in the office today for a 3 month follow up. 1. Have you been to the ER, urgent care clinic since your last visit? Hospitalized since your last visit? No    2. Have you seen or consulted any other health care providers outside of the [facility] since your last visit? Include any pap smears or colon screening.  No

## 2018-04-16 NOTE — MR AVS SNAPSHOT
[facility] 
 
 
 [address] [address] Charis Sharma [address] 
[phone] Patient: Macho Palma MRN: [id number] Jenniffer Sharalex Visit Information Date & Time Provider Department Dept. Phone Encounter #  
 4/16/2018  3:30 PM Ihsan Muñoz[address] [phone] [id number] Follow-up Instructions Return in about 3 months (around 7/16/2018) for labs 1 week before. Upcoming Health Maintenance Date Due DTaP/Tdap/Td series (1 - Tdap) 8/18/1957 ZOSTER VACCINE AGE 60> 6/18/1996 GLAUCOMA SCREENING Q2Y 8/18/2001 Pneumococcal 65+ Low/Medium Risk (1 of 2 - PCV13) 8/18/2001 EYE EXAM RETINAL OR DILATED Q1 8/23/2010 FOOT EXAM Q1 4/24/2016 Influenza Age 5 to Adult 8/1/2017 MEDICARE YEARLY EXAM 5/16/2018 HEMOGLOBIN A1C Q6M 10/9/2018 MICROALBUMIN Q1 12/12/2018 LIPID PANEL Q1 4/9/2019 Allergies as of 4/16/2018  Review Complete On: 4/16/2018 By: Patricia Witt LPN No Known Allergies Current Immunizations  Never Reviewed No immunizations on file. Not reviewed this visit You Were Diagnosed With   
  
 Codes Comments Essential hypertension    -  Primary ICD-10-CM: I10 
ICD-9-CM: 401.9 Late onset Alzheimer's disease without behavioral disturbance     ICD-10-CM: G30.1, F02.80 ICD-9-CM: 331.0, 294.10 Vitals BP Pulse Temp Resp Height(growth percentile) Weight(growth percentile) 159/88 (BP 1 Location: Left arm, BP Patient Position: Sitting) 66 98.2 °F (36.8 °C) (Oral) 16 5' 10\" (1.778 m) 159 lb (72.1 kg) SpO2 BMI Smoking Status 96% 22.81 kg/m2 Never Smoker BMI and BSA Data Body Mass Index Body Surface Area  
 22.81 kg/m 2 1.89 m 2 Preferred Pharmacy Pharmacy Name Phone [facility]. [address] [phone] Your Updated Medication List  
  
   
 This list is accurate as of 4/16/18  4:05 PM.  Always use your most recent med list.  
  
  
  
  
 ALEVE 220 mg tablet Generic drug:  naproxen sodium Take 220 mg by mouth as needed (pain). amLODIPine 5 mg tablet Commonly known as:  Leward Cruz Take 1 Tab by mouth daily. aspirin 325 mg tablet Commonly known as:  ASPIRIN Take 325 mg by mouth daily. meclizine 25 mg tablet Commonly known as:  ANTIVERT Take 1 Tab by mouth three (3) times daily as needed for up to 3 doses. Prescriptions Sent to Pharmacy Refills  
 amLODIPine (NORVASC) 5 mg tablet 5 Sig: Take 1 Tab by mouth daily. Class: Normal  
 Pharmacy: [id number]. Vladislav Rain, [address]  #: [phone] Route: Oral  
  
Follow-up Instructions Return in about 3 months (around 7/16/2018) for labs 1 week before. Patient Instructions High Blood Pressure: Care Instructions Your Care Instructions If your blood pressure is usually above 140/90, you have high blood pressure, or hypertension. That means the top number is 140 or higher or the bottom number is 90 or higher, or both. Despite what a lot of people think, high blood pressure usually doesn't cause headaches or make you feel dizzy or lightheaded. It usually has no symptoms. But it does increase your risk for heart attack, stroke, and kidney or eye damage. The higher your blood pressure, the more your risk increases. Your doctor will give you a goal for your blood pressure. Your goal will be based on your health and your age. An example of a goal is to keep your blood pressure below 140/90. Lifestyle changes, such as eating healthy and being active, are always important to help lower blood pressure. You might also take medicine to reach your blood pressure goal. 
Follow-up care is a key part of your treatment and safety.  Be sure to make and go to all appointments, and call your doctor if you are having problems. It's also a good idea to know your test results and keep a list of the medicines you take. How can you care for yourself at home? Medical treatment · If you stop taking your medicine, your blood pressure will go back up. You may take one or more types of medicine to lower your blood pressure. Be safe with medicines. Take your medicine exactly as prescribed. Call your doctor if you think you are having a problem with your medicine. · Talk to your doctor before you start taking aspirin every day. Aspirin can help certain people lower their risk of a heart attack or stroke. But taking aspirin isn't right for everyone, because it can cause serious bleeding. · See your doctor regularly. You may need to see the doctor more often at first or until your blood pressure comes down. · If you are taking blood pressure medicine, talk to your doctor before you take decongestants or anti-inflammatory medicine, such as ibuprofen. Some of these medicines can raise blood pressure. · Learn how to check your blood pressure at home. Lifestyle changes · Stay at a healthy weight. This is especially important if you put on weight around the waist. Losing even 10 pounds can help you lower your blood pressure. · If your doctor recommends it, get more exercise. Walking is a good choice. Bit by bit, increase the amount you walk every day. Try for at least 30 minutes on most days of the week. You also may want to swim, bike, or do other activities. · Avoid or limit alcohol. Talk to your doctor about whether you can drink any alcohol. · Try to limit how much sodium you eat to less than 2,300 milligrams (mg) a day. Your doctor may ask you to try to eat less than 1,500 mg a day. · Eat plenty of fruits (such as bananas and oranges), vegetables, legumes, whole grains, and low-fat dairy products. · Lower the amount of saturated fat in your diet. Saturated fat is found in animal products such as milk, cheese, and meat. Limiting these foods may help you lose weight and also lower your risk for heart disease. · Do not smoke. Smoking increases your risk for heart attack and stroke. If you need help quitting, talk to your doctor about stop-smoking programs and medicines. These can increase your chances of quitting for good. When should you call for help? Call 911 anytime you think you may need emergency care. This may mean having symptoms that suggest that your blood pressure is causing a serious heart or blood vessel problem. Your blood pressure may be over 180/110. ? For example, call 911 if: 
? · You have symptoms of a heart attack. These may include: ¨ Chest pain or pressure, or a strange feeling in the chest. 
¨ Sweating. ¨ Shortness of breath. ¨ Nausea or vomiting. ¨ Pain, pressure, or a strange feeling in the back, neck, jaw, or upper belly or in one or both shoulders or arms. ¨ Lightheadedness or sudden weakness. ¨ A fast or irregular heartbeat. ? · You have symptoms of a stroke. These may include: 
¨ Sudden numbness, tingling, weakness, or loss of movement in your face, arm, or leg, especially on only one side of your body. ¨ Sudden vision changes. ¨ Sudden trouble speaking. ¨ Sudden confusion or trouble understanding simple statements. ¨ Sudden problems with walking or balance. ¨ A sudden, severe headache that is different from past headaches. ? · You have severe back or belly pain. ?Do not wait until your blood pressure comes down on its own. Get help right away. ?Call your doctor now or seek immediate care if: 
? · Your blood pressure is much higher than normal (such as 180/110 or higher), but you don't have symptoms. ? · You think high blood pressure is causing symptoms, such as: ¨ Severe headache. ¨ Blurry vision. ?Watch closely for changes in your health, and be sure to contact your doctor if: 
? · Your blood pressure measures 140/90 or higher at least 2 times. That means the top number is 140 or higher or the bottom number is 90 or higher, or both. ? · You think you may be having side effects from your blood pressure medicine. ? · Your blood pressure is usually normal, but it goes above normal at least 2 times. Where can you learn more? Go to http://gladis-bigg.info/. Enter W843 in the search box to learn more about \"High Blood Pressure: Care Instructions. \" Current as of: September 21, 2016 Content Version: 11.4 © 7213-5559 Darberry. Care instructions adapted under license by [employer] (which disclaims liability or warranty for this information). If you have questions about a medical condition or this instruction, always ask your healthcare professional. [address] any warranty or liability for your use of this information. Introducing [facility] & HEALTH SERVICES! Juan Polo introduces Joshfire patient portal. Now you can access parts of your medical record, email your doctor's office, and request medication refills online. 1. In your internet browser, go to [email or web address] 2. Click on the First Time User? Click Here link in the Sign In box. You will see the New Member Sign Up page. 3. Enter your Joshfire Access Code exactly as it appears below. You will not need to use this code after youve completed the sign-up process. If you do not sign up before the expiration date, you must request a new code. · Joshfire Access Code: [email or web address] Expires: 7/15/2018  4:05 PM 
 
4. Enter the last four digits of your Social Security Number (xxxx) and Date of Birth (mm/dd/yyyy) as indicated and click Submit. You will be taken to the next sign-up page. 5. Create a Bonfaire ID. This will be your Bonfaire login ID and cannot be changed, so think of one that is secure and easy to remember. 6. Create a Bonfaire password. You can change your password at any time. 7. Enter your Password Reset Question and Answer. This can be used at a later time if you forget your password. 8. Enter your e-mail address. You will receive e-mail notification when new information is available in [address] 9. Click Sign Up. You can now view and download portions of your medical record. 10. Click the Download Summary menu link to download a portable copy of your medical information. If you have questions, please visit the Frequently Asked Questions section of the Bonfaire website. Remember, Bonfaire is NOT to be used for urgent needs. For medical emergencies, dial 911. Now available from your iPhone and Android! Please provide this summary of care documentation to your next provider. Your primary care clinician is listed as JOSE GUEVARA. If you have any questions after today's visit, please call [phone].

## 2018-04-16 NOTE — PATIENT INSTRUCTIONS
High Blood Pressure: Care Instructions  Your Care Instructions    If your blood pressure is usually above 140/90, you have high blood pressure, or hypertension. That means the top number is 140 or higher or the bottom number is 90 or higher, or both. Despite what a lot of people think, high blood pressure usually doesn't cause headaches or make you feel dizzy or lightheaded. It usually has no symptoms. But it does increase your risk for heart attack, stroke, and kidney or eye damage. The higher your blood pressure, the more your risk increases. Your doctor will give you a goal for your blood pressure. Your goal will be based on your health and your age. An example of a goal is to keep your blood pressure below 140/90. Lifestyle changes, such as eating healthy and being active, are always important to help lower blood pressure. You might also take medicine to reach your blood pressure goal.  Follow-up care is a key part of your treatment and safety. Be sure to make and go to all appointments, and call your doctor if you are having problems. It's also a good idea to know your test results and keep a list of the medicines you take. How can you care for yourself at home? Medical treatment  · If you stop taking your medicine, your blood pressure will go back up. You may take one or more types of medicine to lower your blood pressure. Be safe with medicines. Take your medicine exactly as prescribed. Call your doctor if you think you are having a problem with your medicine. · Talk to your doctor before you start taking aspirin every day. Aspirin can help certain people lower their risk of a heart attack or stroke. But taking aspirin isn't right for everyone, because it can cause serious bleeding. · See your doctor regularly. You may need to see the doctor more often at first or until your blood pressure comes down.   · If you are taking blood pressure medicine, talk to your doctor before you take decongestants or anti-inflammatory medicine, such as ibuprofen. Some of these medicines can raise blood pressure. · Learn how to check your blood pressure at home. Lifestyle changes  · Stay at a healthy weight. This is especially important if you put on weight around the waist. Losing even 10 pounds can help you lower your blood pressure. · If your doctor recommends it, get more exercise. Walking is a good choice. Bit by bit, increase the amount you walk every day. Try for at least 30 minutes on most days of the week. You also may want to swim, bike, or do other activities. · Avoid or limit alcohol. Talk to your doctor about whether you can drink any alcohol. · Try to limit how much sodium you eat to less than 2,300 milligrams (mg) a day. Your doctor may ask you to try to eat less than 1,500 mg a day. · Eat plenty of fruits (such as bananas and oranges), vegetables, legumes, whole grains, and low-fat dairy products. · Lower the amount of saturated fat in your diet. Saturated fat is found in animal products such as milk, cheese, and meat. Limiting these foods may help you lose weight and also lower your risk for heart disease. · Do not smoke. Smoking increases your risk for heart attack and stroke. If you need help quitting, talk to your doctor about stop-smoking programs and medicines. These can increase your chances of quitting for good. When should you call for help? Call 911 anytime you think you may need emergency care. This may mean having symptoms that suggest that your blood pressure is causing a serious heart or blood vessel problem. Your blood pressure may be over 180/110. ? For example, call 911 if:  ? · You have symptoms of a heart attack. These may include:  ¨ Chest pain or pressure, or a strange feeling in the chest.  ¨ Sweating. ¨ Shortness of breath. ¨ Nausea or vomiting.   ¨ Pain, pressure, or a strange feeling in the back, neck, jaw, or upper belly or in one or both shoulders or arms.  ¨ Lightheadedness or sudden weakness. ¨ A fast or irregular heartbeat. ? · You have symptoms of a stroke. These may include:  ¨ Sudden numbness, tingling, weakness, or loss of movement in your face, arm, or leg, especially on only one side of your body. ¨ Sudden vision changes. ¨ Sudden trouble speaking. ¨ Sudden confusion or trouble understanding simple statements. ¨ Sudden problems with walking or balance. ¨ A sudden, severe headache that is different from past headaches. ? · You have severe back or belly pain. ?Do not wait until your blood pressure comes down on its own. Get help right away. ?Call your doctor now or seek immediate care if:  ? · Your blood pressure is much higher than normal (such as 180/110 or higher), but you don't have symptoms. ? · You think high blood pressure is causing symptoms, such as:  ¨ Severe headache. ¨ Blurry vision. ? Watch closely for changes in your health, and be sure to contact your doctor if:  ? · Your blood pressure measures 140/90 or higher at least 2 times. That means the top number is 140 or higher or the bottom number is 90 or higher, or both. ? · You think you may be having side effects from your blood pressure medicine. ? · Your blood pressure is usually normal, but it goes above normal at least 2 times. Where can you learn more? Go to http://gladis-bigg.info/. Enter R319 in the search box to learn more about \"High Blood Pressure: Care Instructions. \"  Current as of: September 21, 2016  Content Version: 11.4  © 0446-5396 [employer]. Care instructions adapted under license by [employer] (which disclaims liability or warranty for this information). If you have questions about a medical condition or this instruction, always ask your healthcare professional. [place] [address] any warranty or liability for your use of this information.

## 2018-04-17 NOTE — PROGRESS NOTES
Subjective:       Chief Complaint  The patient presents for follow up of diabetes, hypertension and high cholesterol. HPI  Bakari Anguiano is a 81 y.o. male seen for follow up of diabetes. Healso has hypertension and hyperlipidemia. Diabetes well controlled of metformin,  hypertension borderline controlled, no significant medication side effects noted, on Lotrel, again not sure how compliant he is, will change to Norvasc which he can take daily since if he takes Lotrel daily may have hypotension with his weight loss from poor appetite. , hyperlipidemia well controlled,  Off statin    Diet and Lifestyle: generally follows a low fat low cholesterol diet, follows a diabetic diet regularly, sedentary    Home BP Monitoring: is not measured at home. Diabetic Review of Systems - home glucose monitoring: is performed regularly, 1x/day. Other symptoms and concerns:  Pt with son today. According to son pt's dementia continues to progress with pt doing less and not taking care of ADLs like taking a bath and eating when he is not there. Pt reluctant to kevin meds so doubt Aricept would make a difference for this patient. Will try to give pt Ensure in AM to gain some weight. Current Outpatient Prescriptions   Medication Sig    amLODIPine (NORVASC) 5 mg tablet Take 1 Tab by mouth daily.  aspirin (ASPIRIN) 325 mg tablet Take 325 mg by mouth daily.  naproxen sodium (ALEVE) 220 mg tablet Take 220 mg by mouth as needed (pain).  meclizine (ANTIVERT) 25 mg tablet Take 1 Tab by mouth three (3) times daily as needed for up to 3 doses. No current facility-administered medications for this visit.               Review of Systems  Respiratory: negative for dyspnea on exertion  Cardiovascular: negative for chest pain    Objective:     Visit Vitals    /88 (BP 1 Location: Left arm, BP Patient Position: Sitting)    Pulse 66    Temp 98.2 °F (36.8 °C) (Oral)    Resp 16    Ht 5' 10\" (1.778 m)    Wt 159 lb (72.1 kg)    SpO2 96%    BMI 22.81 kg/m2        General appearance - alert, well appearing, and in no distress  Neck - supple, no significant adenopathy, carotids upstroke normal bilaterally, no bruits  Chest - clear to auscultation, no wheezes, rales or rhonchi, symmetric air entry  Heart - normal rate, regular rhythm, normal S1, S2, no murmurs, rubs, clicks or gallops  Extremities - peripheral pulses normal, no pedal edema, no clubbing or cyanosis  Neuro; pt unsteady on feet getting up to the exam table. Labs:   Lab Results   Component Value Date/Time    Hemoglobin A1c 5.5 04/09/2018 09:22 AM    Hemoglobin A1c 5.8 (H) 12/12/2017 10:45 AM    Hemoglobin A1c 6.3 (H) 08/07/2017 08:32 AM    Microalbumin/Creat ratio (mg/g creat) 111 (H) 12/12/2017 10:45 AM    Microalbumin,urine random 18.60 (H) 12/12/2017 10:45 AM    LDL, calculated 37.4 04/09/2018 09:22 AM    Creatinine 0.94 04/09/2018 09:22 AM      Lab Results   Component Value Date/Time    Cholesterol, total 84 04/09/2018 09:22 AM    HDL Cholesterol 31 (L) 04/09/2018 09:22 AM    LDL, calculated 37.4 04/09/2018 09:22 AM    Triglyceride 78 04/09/2018 09:22 AM    CHOL/HDL Ratio 2.7 04/09/2018 09:22 AM     Lab Results   Component Value Date/Time    AST (SGOT) 13 (L) 04/09/2018 09:22 AM    Alk. phosphatase 74 04/09/2018 09:22 AM     Lab Results   Component Value Date/Time    GFR est AA >60 04/09/2018 09:22 AM    GFR est non-AA >60 04/09/2018 09:22 AM    Creatinine 0.94 04/09/2018 09:22 AM    BUN 11 04/09/2018 09:22 AM    Sodium 141 04/09/2018 09:22 AM    Potassium 4.2 04/09/2018 09:22 AM    Chloride 106 04/09/2018 09:22 AM    CO2 26 04/09/2018 09:22 AM            Assessment / Plan     Diabetes well controlled, off metformin   Hypertension borderline controlled, will change to Norvasc 5 mg do pt can take daily. Hyperlipidemia well controlled, off ZOcor. ICD-10-CM ICD-9-CM    1.  Essential hypertension I10 401.9 amLODIPine (NORVASC) 5 mg tablet      LIPID PANEL      METABOLIC PANEL, COMPREHENSIVE   2. Late onset Alzheimer's disease without behavioral disturbance G30.1 331.0 Slowly progressing. Family able to mange pt at home at this time. F02.80 294.10    3. Weight loss R63.4 783.21 Family to try Ensure to help with weight gain. Diabetic issues reviewed with him: diabetic diet discussed in detail, and low cholesterol diet, weight control and daily exercise discussed. Follow-up Disposition:  Return in about 3 months (around 7/16/2018) for labs 1 week before. Reviewed plan of care. Patient has provided input and agrees with goals.

## 2018-07-19 NOTE — PROGRESS NOTES
Subjective:       Chief Complaint  The patient presents for follow up of diabetes, hypertension and high cholesterol. NEL Sandoval is a 81 y.o. male seen for follow up of diabetes. Healso has hypertension and hyperlipidemia. Diabetes well controlled off metformin,  hypertension uncontrolled, no significant medication side effects noted, on norvasc, son tells me that patient is not always compliant with taking the medication, when he gives it to him he sometimes delays taking it and then does not take it for the rest of the day, hyperlipidemia well controlled,  Off statin    Diet and Lifestyle: generally follows a low fat low cholesterol diet, follows a diabetic diet regularly, sedentary    Home BP Monitoring: is not measured at home. Diabetic Review of Systems - home glucose monitoring: is performed regularly, 1x/day. Other symptoms and concerns:  Pt with son today. According to son pt's dementia continues to progress with pt doing less and not taking care of ADLs like taking a bath and eating when he is not there. Pt reluctant to take meds so doubt Aricept would make a difference for this patient. Patient's son will continue to monitor his appetite and try to get him Ensure to see if it helps to maintain his weight. Current Outpatient Prescriptions   Medication Sig    amLODIPine (NORVASC) 5 mg tablet Take 1 Tab by mouth daily.  naproxen sodium (ALEVE) 220 mg tablet Take 220 mg by mouth as needed (pain).  meclizine (ANTIVERT) 25 mg tablet Take 1 Tab by mouth three (3) times daily as needed for up to 3 doses.  aspirin (ASPIRIN) 325 mg tablet Take 325 mg by mouth daily. No current facility-administered medications for this visit.               Review of Systems  Respiratory: negative for dyspnea on exertion  Cardiovascular: negative for chest pain    Objective:     Visit Vitals    BP (!) 167/98 (BP 1 Location: Left arm, BP Patient Position: Sitting)    Pulse 62    Temp 98.1 °F (36.7 °C) (Oral)    Resp 20    Ht 5' 10\" (1.778 m)    Wt 154 lb (69.9 kg)    SpO2 95%    BMI 22.1 kg/m2        General appearance - alert, well appearing, and in no distress  Neck - supple, no significant adenopathy, carotids upstroke normal bilaterally, no bruits  Chest - clear to auscultation, no wheezes, rales or rhonchi, symmetric air entry  Heart - normal rate, regular rhythm, normal S1, S2, no murmurs, rubs, clicks or gallops  Extremities - peripheral pulses normal, no pedal edema, no clubbing or cyanosis  Neuro; pt unsteady on feet getting up to the exam table. Labs:   Lab Results   Component Value Date/Time    Hemoglobin A1c 5.5 04/09/2018 09:22 AM    Hemoglobin A1c 5.8 (H) 12/12/2017 10:45 AM    Hemoglobin A1c 6.3 (H) 08/07/2017 08:32 AM    Microalbumin/Creat ratio (mg/g creat) 111 (H) 12/12/2017 10:45 AM    Microalbumin,urine random 18.60 (H) 12/12/2017 10:45 AM    LDL, calculated 39.4 07/12/2018 09:17 AM    Creatinine 1.16 07/12/2018 09:17 AM      Lab Results   Component Value Date/Time    Cholesterol, total 83 07/12/2018 09:17 AM    HDL Cholesterol 31 (L) 07/12/2018 09:17 AM    LDL, calculated 39.4 07/12/2018 09:17 AM    Triglyceride 63 07/12/2018 09:17 AM    CHOL/HDL Ratio 2.7 07/12/2018 09:17 AM     Lab Results   Component Value Date/Time    AST (SGOT) 10 (L) 07/12/2018 09:17 AM    Alk. phosphatase 68 07/12/2018 09:17 AM     Lab Results   Component Value Date/Time    GFR est AA >60 07/12/2018 09:17 AM    GFR est non-AA >60 07/12/2018 09:17 AM    Creatinine 1.16 07/12/2018 09:17 AM    BUN 11 07/12/2018 09:17 AM    Sodium 141 07/12/2018 09:17 AM    Potassium 4.2 07/12/2018 09:17 AM    Chloride 106 07/12/2018 09:17 AM    CO2 27 07/12/2018 09:17 AM            Assessment / Plan     Diabetes well controlled, off metformin   Hypertension uncontrolled, patient's son will try and make patient be as compliant as possible with taking this medication   hyperlipidemia well controlled, off ZOcor. ICD-10-CM ICD-9-CM    1. HTN (hypertension), benign M18 556.8 METABOLIC PANEL, COMPREHENSIVE   2. Late onset Alzheimer's disease without behavioral disturbance G30.1 331.0  patient is not very good with taking medicine so would not benefit from Aricept. Patient's son continues to take care of patient at home at this time    F02.80 294.10    3. Weight loss R63.4 783.21  patient's son will try having patient take Ensure on a more regular basis. He tells me his appetite is fairly good at home   4. Controlled type 2 diabetes mellitus with microalbuminuria, without long-term current use of insulin (HCC) E11.29 250.40  resolved with patient significant weight loss LIPID PANEL    R80.9 791.0 HEMOGLOBIN A1C W/O EAG                Diabetic issues reviewed with him: diabetic diet discussed in detail, and low cholesterol diet, weight control and daily exercise discussed. Follow-up Disposition:  Return in about 4 months (around 11/19/2018) for labs 1 week before. Reviewed plan of care. Patient has provided input and agrees with goals.

## 2018-07-19 NOTE — PROGRESS NOTES
Patient is in the office today for a 3 month follow up. Son states patient is not taking BP meds consistently. 1. Have you been to the ER, urgent care clinic since your last visit? Hospitalized since your last visit? No    2. Have you seen or consulted any other health care providers outside of the [address] [place] [address] since your last visit? Include any pap smears or colon screening.  No

## 2018-07-19 NOTE — PATIENT INSTRUCTIONS

## 2018-07-19 NOTE — MR AVS SNAPSHOT
[address] [place] [address] 
 
 
 [address] 9369 Charis Sharma [address] 
[phone] Patient: Kev Brar MRN: [id number] Gwinda Daniel Visit Information Date & Time Provider Department Dept. Phone Encounter #  
 7/19/2018  9:00 AM Aleta Ricci, [address] [place] [address] [phone] [id number] Follow-up Instructions Return in about 4 months (around 11/19/2018) for labs 1 week before. Upcoming Health Maintenance Date Due DTaP/Tdap/Td series (1 - Tdap) 8/18/1956 ZOSTER VACCINE AGE 60> 6/18/1996 GLAUCOMA SCREENING Q2Y 8/18/2001 Pneumococcal 65+ Low/Medium Risk (1 of 2 - PCV13) 8/18/2001 EYE EXAM RETINAL OR DILATED Q1 8/23/2010 FOOT EXAM Q1 4/24/2016 Influenza Age 5 to Adult 8/1/2018 HEMOGLOBIN A1C Q6M 10/9/2018 MICROALBUMIN Q1 12/12/2018 LIPID PANEL Q1 7/12/2019 Allergies as of 7/19/2018  Review Complete On: 7/19/2018 By: Aleta Ricci MD  
 No Known Allergies Current Immunizations  Never Reviewed No immunizations on file. Not reviewed this visit You Were Diagnosed With   
  
 Codes Comments HTN (hypertension), benign    -  Primary ICD-10-CM: I10 
ICD-9-CM: 401.1 Late onset Alzheimer's disease without behavioral disturbance     ICD-10-CM: G30.1, F02.80 ICD-9-CM: 331.0, 294.10 Vitals BP Pulse Temp Resp Height(growth percentile) Weight(growth percentile) (!) 167/98 (BP 1 Location: Left arm, BP Patient Position: Sitting) 62 98.1 °F (36.7 °C) (Oral) 20 5' 10\" (1.778 m) 154 lb (69.9 kg) SpO2 BMI Smoking Status 95% 22.1 kg/m2 Never Smoker BMI and BSA Data Body Mass Index Body Surface Area  
 22.1 kg/m 2 1.86 m 2 Preferred Pharmacy Pharmacy Name Phone [id number]. Victor Manuel Ross, [address] [facility] [phone] Your Updated Medication List  
  
   
This list is accurate as of 7/19/18  9:24 AM.  Always use your most recent med list.  
  
  
  
  
 ALEVE 220 mg tablet Generic drug:  naproxen sodium Take 220 mg by mouth as needed (pain). amLODIPine 5 mg tablet Commonly known as:  Jaylin Colon Take 1 Tab by mouth daily. aspirin 325 mg tablet Commonly known as:  ASPIRIN Take 325 mg by mouth daily. meclizine 25 mg tablet Commonly known as:  ANTIVERT Take 1 Tab by mouth three (3) times daily as needed for up to 3 doses. Follow-up Instructions Return in about 4 months (around 11/19/2018) for labs 1 week before. Patient Instructions High Blood Pressure: Care Instructions Your Care Instructions If your blood pressure is usually above 130/80, you have high blood pressure, or hypertension. That means the top number is 130 or higher or the bottom number is 80 or higher, or both. Despite what a lot of people think, high blood pressure usually doesn't cause headaches or make you feel dizzy or lightheaded. It usually has no symptoms. But it does increase your risk for heart attack, stroke, and kidney or eye damage. The higher your blood pressure, the more your risk increases. Your doctor will give you a goal for your blood pressure. Your goal will be based on your health and your age. Lifestyle changes, such as eating healthy and being active, are always important to help lower blood pressure. You might also take medicine to reach your blood pressure goal. 
Follow-up care is a key part of your treatment and safety. Be sure to make and go to all appointments, and call your doctor if you are having problems. It's also a good idea to know your test results and keep a list of the medicines you take. How can you care for yourself at home? Medical treatment · If you stop taking your medicine, your blood pressure will go back up. You may take one or more types of medicine to lower your blood pressure. Be safe with medicines. Take your medicine exactly as prescribed.  Call your doctor if you think you are having a problem with your medicine. · Talk to your doctor before you start taking aspirin every day. Aspirin can help certain people lower their risk of a heart attack or stroke. But taking aspirin isn't right for everyone, because it can cause serious bleeding. · See your doctor regularly. You may need to see the doctor more often at first or until your blood pressure comes down. · If you are taking blood pressure medicine, talk to your doctor before you take decongestants or anti-inflammatory medicine, such as ibuprofen. Some of these medicines can raise blood pressure. · Learn how to check your blood pressure at home. Lifestyle changes · Stay at a healthy weight. This is especially important if you put on weight around the waist. Losing even 10 pounds can help you lower your blood pressure. · If your doctor recommends it, get more exercise. Walking is a good choice. Bit by bit, increase the amount you walk every day. Try for at least 30 minutes on most days of the week. You also may want to swim, bike, or do other activities. · Avoid or limit alcohol. Talk to your doctor about whether you can drink any alcohol. · Try to limit how much sodium you eat to less than 2,300 milligrams (mg) a day. Your doctor may ask you to try to eat less than 1,500 mg a day. · Eat plenty of fruits (such as bananas and oranges), vegetables, legumes, whole grains, and low-fat dairy products. · Lower the amount of saturated fat in your diet. Saturated fat is found in animal products such as milk, cheese, and meat. Limiting these foods may help you lose weight and also lower your risk for heart disease. · Do not smoke. Smoking increases your risk for heart attack and stroke. If you need help quitting, talk to your doctor about stop-smoking programs and medicines. These can increase your chances of quitting for good. When should you call for help? Call 911 anytime you think you may need emergency care. This may mean having symptoms that suggest that your blood pressure is causing a serious heart or blood vessel problem. Your blood pressure may be over 180/110. 
 For example, call 911 if: 
  · You have symptoms of a heart attack. These may include: ¨ Chest pain or pressure, or a strange feeling in the chest. 
¨ Sweating. ¨ Shortness of breath. ¨ Nausea or vomiting. ¨ Pain, pressure, or a strange feeling in the back, neck, jaw, or upper belly or in one or both shoulders or arms. ¨ Lightheadedness or sudden weakness. ¨ A fast or irregular heartbeat.  
  · You have symptoms of a stroke. These may include: 
¨ Sudden numbness, tingling, weakness, or loss of movement in your face, arm, or leg, especially on only one side of your body. ¨ Sudden vision changes. ¨ Sudden trouble speaking. ¨ Sudden confusion or trouble understanding simple statements. ¨ Sudden problems with walking or balance. ¨ A sudden, severe headache that is different from past headaches.  
  · You have severe back or belly pain.  
 Do not wait until your blood pressure comes down on its own. Get help right away. 
 Call your doctor now or seek immediate care if: 
  · Your blood pressure is much higher than normal (such as 180/110 or higher), but you don't have symptoms.  
  · You think high blood pressure is causing symptoms, such as: ¨ Severe headache. ¨ Blurry vision.  
 Watch closely for changes in your health, and be sure to contact your doctor if: 
  · Your blood pressure measures 140/90 or higher at least 2 times. That means the top number is 140 or higher or the bottom number is 90 or higher, or both.  
  · You think you may be having side effects from your blood pressure medicine.  
  · Your blood pressure is usually normal, but it goes above normal at least 2 times. Where can you learn more? Go to http://gladis-bigg.info/. Enter R756 in the search box to learn more about \"High Blood Pressure: Care Instructions. \" Current as of: December 6, 2017 Content Version: 11.7 © 2774-1863 [employer]. Care instructions adapted under license by [employer] (which disclaims liability or warranty for this information). If you have questions about a medical condition or this instruction, always ask your healthcare professional. [address] any warranty or liability for your use of this information. Introducing [facility] HE[facility]! Bart Salgado introduces Giftbar patient portal. Now you can access parts of your medical record, email your doctor's office, and request medication refills online. 1. In your internet browser, go to [email or web address] 2. Click on the First Time User? Click Here link in the Sign In box. You will see the New Member Sign Up page. 3. Enter your Giftbar Access Code exactly as it appears below. You will not need to use this code after youve completed the sign-up process. If you do not sign up before the expiration date, you must request a new code. · Giftbar Access Code: [email or web address] Expires: 10/17/2018  9:13 AM 
 
4. Enter the last four digits of your Social Security Number (xxxx) and Date of Birth (mm/dd/yyyy) as indicated and click Submit. You will be taken to the next sign-up page. 5. Create a Giftbar ID. This will be your Giftbar login ID and cannot be changed, so think of one that is secure and easy to remember. 6. Create a Giftbar password. You can change your password at any time. 7. Enter your Password Reset Question and Answer. This can be used at a later time if you forget your password. 8. Enter your e-mail address. You will receive e-mail notification when new information is available in [address] 9. Click Sign Up. You can now view and download portions of your medical record. 10. Click the Download Summary menu link to download a portable copy of your medical information. If you have questions, please visit the Frequently Asked Questions section of the TURN8 website. Remember, TURN8 is NOT to be used for urgent needs. For medical emergencies, dial 911. Now available from your iPhone and Android! Please provide this summary of care documentation to your next provider. Your primary care clinician is listed as JOSE GUVEARA. If you have any questions after today's visit, please call [phone].

## 2018-10-16 PROBLEM — N39.0 UTI (URINARY TRACT INFECTION): Status: ACTIVE | Noted: 2018-01-01

## 2018-10-16 NOTE — HOME CARE
Received HH referral from ER, pt discharged back home; [facility] will follow for SN,PT; pt's son (Arya) states pt has a cane, also son is requesting [facility] visits after 3pm ; [facility] will follow. MATT ALVARADO.

## 2018-10-16 NOTE — PROGRESS NOTES
Asked to see pt/son for placement from ED. Met with son and explained to son that pt does not have a qualifying stay for SNF but could place if wanted to private pay. Son was agreeable at first, but since pt and spouse have the financial resources, son would like to try hiring help in the home for both pt and spouse (spouse also brought to ED today). Provided son with personal care providers, [facility] contact (for assisted living), and discussed at length private pay for NH. Son felt confident that he could hire help and would like to try this prior to placing both parents in a NH or AL. Referral for [facility] made to [phone]. [facility] Jaye Jimenez, [phone]

## 2018-10-16 NOTE — ED NOTES
Pt. Given d/c papers and had no questions @ this time. Pt. Wheeled to lobby and assisted to son's vehicle.

## 2018-10-16 NOTE — ED TRIAGE NOTES
Pt. Arrived via medic from home c/o generalized weakness and poor appetite. Pt. Arrived soiled/disheveled and says his wife takes care of him @ home. Pt hx = alzheimer's. Pt AO x 4 @ this time. Denies pain.

## 2018-10-16 NOTE — DISCHARGE INSTRUCTIONS

## 2018-10-16 NOTE — Clinical Note
Status[de-identified] INPATIENT [101] Type of Bed: Medical [8] Inpatient Hospitalization Certified Necessary for the Following Reasons: 3. Patient receiving treatment that can only be provided in an inpatient setting (further clarification in H&P documentation) Admitting Diagnosis: UTI (urinary tract infection) [114031] Admitting Physician: Che Kelly [[id number]] Attending Physician: Che Kelly [[id number]] Estimated Length of Stay: 2 Midnights Discharge Plan[de-identified] Extended Care Facility (e.g. Adult Home, Nursing Home, etc.)

## 2018-10-16 NOTE — ED PROVIDER NOTES
EMERGENCY DEPARTMENT HISTORY AND PHYSICAL EXAM 
 
10:06 AM 
 
 
Date: 10/16/2018 Patient Name: Maxx Philip History of Presenting Illness Chief Complaint Patient presents with  
 Other  
  failure to thrive History Provided By: EMS Chief Complaint: Failure to thrive Duration: The past three days Timing: Constant Location: Psych Quality: N/A Severity: Moderate Modifying Factors: There are no modifying factors Associated Symptoms: Patient is in late stage Alzheimer's Additional History (Context): 10:11 AM Maxx Philip is a 81 y.o. male with h/o vertigo, dementia, and does not drink ETOH who presents to ED upon evaluation of constant moderate failure to thrive onset the past three days. Patient's son lives with patient and called EMS. Per EMS, patient is late stage Alzheimer's, has a soiled depend that hasn't been changed in the past three days. The patient has not gotten out of bed the past three days. History is limited due to patient's dementia. No other concerns or symptoms at this time. PCP: Socrates Rene MD 
 
Current Outpatient Prescriptions Medication Sig Dispense Refill  nitrofurantoin, macrocrystal-monohydrate, (MACROBID) 100 mg capsule Take 1 Cap by mouth two (2) times a day for 3 days. 6 Cap 0  
 amLODIPine (NORVASC) 5 mg tablet Take 1 Tab by mouth daily. 30 Tab 5  
 naproxen sodium (ALEVE) 220 mg tablet Take 220 mg by mouth as needed (pain).  meclizine (ANTIVERT) 25 mg tablet Take 1 Tab by mouth three (3) times daily as needed for up to 3 doses. 9 Tab 0  
 aspirin (ASPIRIN) 325 mg tablet Take 325 mg by mouth daily. Past History Past Medical History: 
Past Medical History:  
Diagnosis Date  CKD (chronic kidney disease) stage 2, GFR 60-89 ml/min 3/29/2010  Dementia  High blood cholesterol level 3/29/2010  
 HTN (hypertension), benign 3/29/2010  Hyperglycemia 3/29/2010  Microalbuminuria 12/14/2010  Vertigo 3/29/2010 Past Surgical History: 
History reviewed. No pertinent surgical history. Family History: 
History reviewed. No pertinent family history. Social History: 
Social History Substance Use Topics  Smoking status: Never Smoker  Smokeless tobacco: Never Used  Alcohol use No  
 
 
Allergies: 
No Known Allergies Review of Systems Review of Systems Unable to perform ROS: Dementia Physical Exam  
 
Patient Vitals for the past 12 hrs: 
 Temp Pulse Resp BP SpO2  
10/16/18 1300 - 62 19 (!) 174/91 -  
10/16/18 1245 - 66 17 (!) 173/95 -  
10/16/18 1230 - 62 18 (!) 161/97 -  
10/16/18 1215 - 69 23 173/88 -  
10/16/18 1200 - 71 20 165/88 -  
10/16/18 1145 - 69 16 166/88 -  
10/16/18 1130 - 72 - (!) 183/95 -  
10/16/18 1115 - 64 19 158/81 -  
10/16/18 1105 - - - - 98 % 10/16/18 1100 97.3 °F (36.3 °C) 64 18 142/80 98 % 10/16/18 1045 - 65 20 155/84 -  
10/16/18 1030 - 72 22 156/81 -  
10/16/18 1015 - 73 20 (!) 173/93 - Physical Exam  
Constitutional: He appears well-developed and well-nourished. No distress. Appears comfortable, no acute distress or pain. Smells of urine, depend is full. Socks are soaked with urine. HENT:  
Head: Normocephalic and atraumatic. Eyes: Conjunctivae are normal. No scleral icterus. Neck: Normal range of motion. Neck supple. No JVD present. Cardiovascular: Normal rate, regular rhythm and normal heart sounds. 4 intact extremity pulses Pulmonary/Chest: Effort normal and breath sounds normal.  
Abdominal: Soft. He exhibits no mass. There is no tenderness. Musculoskeletal: Normal range of motion. Lymphadenopathy:  
  He has no cervical adenopathy. Neurological: He is alert. Skin: Skin is warm and dry. Nursing note and vitals reviewed. Diagnostic Study Results Labs - Recent Results (from the past 12 hour(s)) CBC WITH AUTOMATED DIFF Collection Time: 10/16/18 10:42 AM  
Result Value Ref Range WBC 5.2 4.6 - 13.2 K/uL RBC 3.40 (L) 4.70 - 5.50 M/uL HGB 9.7 (L) 13.0 - 16.0 g/dL HCT 28.8 (L) 36.0 - 48.0 % MCV 84.7 74.0 - 97.0 FL  
 MCH 28.5 24.0 - 34.0 PG  
 MCHC 33.7 31.0 - 37.0 g/dL  
 RDW 13.0 11.6 - 14.5 % PLATELET 940 772 - 699 K/uL MPV 11.2 9.2 - 11.8 FL  
 NEUTROPHILS 77 (H) 40 - 73 % LYMPHOCYTES 12 (L) 21 - 52 % MONOCYTES 11 (H) 3 - 10 % EOSINOPHILS 0 0 - 5 % BASOPHILS 0 0 - 2 %  
 ABS. NEUTROPHILS 4.0 1.8 - 8.0 K/UL  
 ABS. LYMPHOCYTES 0.6 (L) 0.9 - 3.6 K/UL  
 ABS. MONOCYTES 0.6 0.05 - 1.2 K/UL  
 ABS. EOSINOPHILS 0.0 0.0 - 0.4 K/UL  
 ABS. BASOPHILS 0.0 0.0 - 0.1 K/UL  
 DF AUTOMATED METABOLIC PANEL, BASIC Collection Time: 10/16/18 10:42 AM  
Result Value Ref Range Sodium 138 136 - 145 mmol/L Potassium 4.7 3.5 - 5.5 mmol/L Chloride 104 100 - 108 mmol/L  
 CO2 26 21 - 32 mmol/L Anion gap 8 3.0 - 18 mmol/L Glucose 102 (H) 74 - 99 mg/dL BUN 18 7.0 - 18 MG/DL Creatinine 1.11 0.6 - 1.3 MG/DL  
 BUN/Creatinine ratio 16 12 - 20 GFR est AA >60 >60 ml/min/1.73m2 GFR est non-AA >60 >60 ml/min/1.73m2 Calcium 10.0 8.5 - 10.1 MG/DL  
TROPONIN I Collection Time: 10/16/18 10:42 AM  
Result Value Ref Range Troponin-I, Qt. 0.03 0.0 - 0.045 NG/ML  
POC LACTIC ACID Collection Time: 10/16/18 10:51 AM  
Result Value Ref Range Lactic Acid (POC) 1.9 0.4 - 2.0 mmol/L  
URINALYSIS W/ RFLX MICROSCOPIC Collection Time: 10/16/18 11:30 AM  
Result Value Ref Range Color DARK YELLOW Appearance CLEAR Specific gravity 1.019 1.005 - 1.030    
 pH (UA) 5.0 5.0 - 8.0 Protein 100 (A) NEG mg/dL Glucose NEGATIVE  NEG mg/dL Ketone NEGATIVE  NEG mg/dL Bilirubin NEGATIVE  NEG Blood LARGE (A) NEG Urobilinogen 1.0 0.2 - 1.0 EU/dL Nitrites POSITIVE (A) NEG Leukocyte Esterase SMALL (A) NEG URINE MICROSCOPIC ONLY Collection Time: 10/16/18 11:30 AM  
Result Value Ref Range  WBC PRESENT 0 - 4 /hpf  
 RBC TOO NUMEROUS TO COUNT 0 - 5 /hpf Epithelial cells FEW 0 - 5 /lpf Bacteria 1+ (A) NEG /hpf Radiologic Studies - No orders to display No results found. Medications ordered:  
Medications  
cefTRIAXone (ROCEPHIN) injection 1 g (1 g IntraVENous Given 10/16/18 1213) sodium chloride 0.9 % bolus infusion 1,000 mL (1,000 mL IntraVENous New Bag 10/16/18 1212)  
0.9% sodium chloride infusion (100 mL/hr IntraVENous New Bag 10/16/18 1212) Medical Decision Making Initial Medical Decision Making and DDx: 
Dehydration, progressive dementia, UTI 
ED Course: Progress Notes, Reevaluation, and Consults: 
ED Course 11:53 AM 
Spoke to Dr. Maryann Rascon, will see and admit 11:56 AM 
Spoke to Matt in case management about condition and admission of patient 1:19 PM  
Discussed with case management, family is admittable to discharge and hiring help at home. I am the first provider for this patient. I reviewed the vital signs, available nursing notes, past medical history, past surgical history, family history and social history. Vital Signs-Reviewed the patient's vital signs. Pulse Oximetry Analysis - 98% on room air WNL Cardiac Monitor: 
Rate/Rhythm:  64 bpm 
 
EKG: Interpreted by the EP. Time Interpreted: 11:22AM 
 Rate: 63 bpm 
 Rhythm: Normal Sinus Rhythm Interpretation: Nothing acute Records Reviewed: Nursing Notes and Old Medical Records (Time of Review: 10:06 AM) For Hospitalized Patients: 
 
2. Aspirin: Aspirin was not given because the patient did not present with a stroke at the time of their Emergency Department evaluation Diagnosis Clinical Impression: 1. Urinary tract infection without hematuria, site unspecified Disposition: Admitted Follow-up Information Follow up With Details Comments Contact Info Brigid Randall MD In 2 days  [address] [facility] [address] [facility] [address] [phone] Patient's Medications Start Taking NITROFURANTOIN, MACROCRYSTAL-MONOHYDRATE, (MACROBID) 100 MG CAPSULE    Take 1 Cap by mouth two (2) times a day for 3 days. Continue Taking AMLODIPINE (NORVASC) 5 MG TABLET    Take 1 Tab by mouth daily. ASPIRIN (ASPIRIN) 325 MG TABLET    Take 325 mg by mouth daily. MECLIZINE (ANTIVERT) 25 MG TABLET    Take 1 Tab by mouth three (3) times daily as needed for up to 3 doses. NAPROXEN SODIUM (ALEVE) 220 MG TABLET    Take 220 mg by mouth as needed (pain). These Medications have changed No medications on file Stop Taking No medications on file  
 
_______________________________ Attestations: 
Scribe Attestation Dafne Yepez acting as a scribe for and in the presence of Tori Miller MD     
October 16, 2018 at 10:06 AM 
    
Provider Attestation:     
I personally performed the services described in the documentation, reviewed the documentation, as recorded by the scribe in my presence, and it accurately and completely records my words and actions. October 16, 2018 at 10:06 AM - Tori Miller MD   
_______________________________

## 2018-10-17 NOTE — HOME CARE
Notified  Akira Capps yesterday afternoon and also today that pt needs a PCP appt set up in order for [facility] to follow this pt , pt also needs a Face to Face encounter ;  Elisabeth Vargas) states she will call pt's son to have him make a f/u appt for this pt. MATT ALVARADO.

## 2018-10-30 NOTE — TELEPHONE ENCOUNTER
I will sign order for speech therapy.  Try using Ensure 3x/day once pt seen by speech and if they agree with it

## 2018-10-30 NOTE — TELEPHONE ENCOUNTER
Isabella Chan  From [facility] called she needs a referral for speech therapy for the pt, whenever the pt is eating he is coughing. She also said that is weight is 144 lbs and that the pt is having a decreased appetite. [phone]  Please advise.

## 2018-10-31 NOTE — TELEPHONE ENCOUNTER
Spoke with Dell Samayoa she states son is working all day and states he does not eat until he gets home. Dell Samayoa states son does have a company coming to the home to help with ADLs. Informed Dell Samayoa the order will be faxed.

## 2018-11-19 NOTE — PROGRESS NOTES
Patient is in the office today for 4 month follow up. 1. Have you been to the ER, urgent care clinic since your last visit? Hospitalized since your last visit? Yes 10/16/18 UTI    2. Have you seen or consulted any other health care providers outside of the [facility] since your last visit? Include any pap smears or colon screening.  No

## 2018-11-19 NOTE — PROGRESS NOTES
Subjective:       Chief Complaint  The patient presents for follow up of diabetes, hypertension and high cholesterol. NEL Norris is a 81 y.o. male seen for follow up of diabetes. Healso has hypertension and hyperlipidemia. Diabetes well controlled off metformin and with significant weight loss,  hypertension uncontrolled, no significant medication side effects noted, on norvasc, son tells me that patient is not always compliant with taking the medication, when he gives it to him he sometimes delays taking it and then does not take it for the rest of the day, hyperlipidemia well controlled,  Off statin    Diet and Lifestyle: generally follows a low fat low cholesterol diet, follows a diabetic diet regularly, sedentary    Home BP Monitoring: is not measured at home. Diabetic Review of Systems - home glucose monitoring: is performed regularly, 1x/day. Other symptoms and concerns:  Pt with son today. According to son pt's dementia continues to progress with pt doing less and not taking care of ADLs like taking a bath and eating when he is not there. Pt reluctant to take meds so doubt Aricept would make a difference for this patient. Patient's son will continue to monitor his appetite and try to get him Ensure to see if it helps to maintain his weight. Son tells me today that he has noticed that he is eating even less off of his plate over the last 1-2 months. Patient when he went to the emergency room recently was anemic and he has a high total protein to albumin ratio which may be suspicious of multiple myeloma. We will do iron studies and UPEP and SPEP to see if further management is needed. If anemia is worsening patient may need a colonoscopy. Current Outpatient Medications   Medication Sig    naproxen sodium (ALEVE) 220 mg tablet Take 220 mg by mouth daily as needed for Pain.  aspirin (ASPIRIN) 325 mg tablet Take 325 mg by mouth daily.     nitrofurantoin, macrocrystal-monohydrate, (MACROBID) 100 mg capsule Take 100 mg by mouth two (2) times a day.  amLODIPine (NORVASC) 5 mg tablet Take 1 Tab by mouth daily.  meclizine (ANTIVERT) 25 mg tablet Take 1 Tab by mouth three (3) times daily as needed for up to 3 doses. (Patient taking differently: Take 1 Tab by mouth three (3) times daily as needed for Dizziness.)     No current facility-administered medications for this visit. Review of Systems  Respiratory: negative for dyspnea on exertion  Cardiovascular: negative for chest pain    Objective:     Visit Vitals  /82 (BP 1 Location: Left arm, BP Patient Position: Sitting)   Pulse 74   Temp 98.1 °F (36.7 °C) (Oral)   Resp 20   Ht 5' 10\" (1.778 m)   Wt 150 lb (68 kg)   SpO2 98%   BMI 21.52 kg/m²        General appearance - alert, well appearing, and in no distress  Neck - supple, no significant adenopathy, carotids upstroke normal bilaterally, no bruits  Chest - clear to auscultation, no wheezes, rales or rhonchi, symmetric air entry  Heart - normal rate, regular rhythm, normal S1, S2, no murmurs, rubs, clicks or gallops  Extremities - peripheral pulses normal, no pedal edema, no clubbing or cyanosis  Neuro; pt unsteady on feet getting up to the exam table.        Labs:   Lab Results   Component Value Date/Time    Hemoglobin A1c 5.6 11/12/2018 07:24 AM    Hemoglobin A1c 5.5 04/09/2018 09:22 AM    Hemoglobin A1c 5.8 (H) 12/12/2017 10:45 AM    Microalbumin/Creat ratio (mg/g creat) 111 (H) 12/12/2017 10:45 AM    Microalbumin,urine random 18.60 (H) 12/12/2017 10:45 AM    LDL, calculated 41.2 11/12/2018 07:24 AM    Creatinine 1.05 11/12/2018 07:24 AM      Lab Results   Component Value Date/Time    Cholesterol, total 88 11/12/2018 07:24 AM    HDL Cholesterol 33 (L) 11/12/2018 07:24 AM    LDL, calculated 41.2 11/12/2018 07:24 AM    Triglyceride 69 11/12/2018 07:24 AM    CHOL/HDL Ratio 2.7 11/12/2018 07:24 AM     Lab Results   Component Value Date/Time    AST (SGOT) 19 11/12/2018 07:24 AM    Alk. phosphatase 83 11/12/2018 07:24 AM     Lab Results   Component Value Date/Time    GFR est AA >60 11/12/2018 07:24 AM    GFR est non-AA >60 11/12/2018 07:24 AM    Creatinine 1.05 11/12/2018 07:24 AM    BUN 13 11/12/2018 07:24 AM    Sodium 143 11/12/2018 07:24 AM    Potassium 4.3 11/12/2018 07:24 AM    Chloride 106 11/12/2018 07:24 AM    CO2 28 11/12/2018 07:24 AM      Labs:   Lab Results   Component Value Date/Time    WBC 3.6 (L) 11/19/2018 04:00 PM    HGB 10.9 (L) 11/19/2018 04:00 PM    HCT 34.1 (L) 11/19/2018 04:00 PM    PLATELET 998 92/20/6784 04:00 PM    MCV 90.0 11/19/2018 04:00 PM           Assessment / Plan     Diabetes well controlled, off metformin   Hypertension uncontrolled, patient's son will try and make patient be as compliant as possible with taking this medication   hyperlipidemia well controlled, off ZOcor. ICD-10-CM ICD-9-CM    1. Controlled type 2 diabetes mellitus with microalbuminuria, without long-term current use of insulin (HCC) E11.29 250.40 HEMOGLOBIN A1C W/O EAG    R80.9 791.0    2. HTN (hypertension), benign B43 725.7 METABOLIC PANEL, COMPREHENSIVE   3. High blood cholesterol level E78.00 272.0 LIPID PANEL   4. Anemia, unspecified type D64.9 285.9  we will check CBC WITH AUTOMATED DIFF      IRON PROFILE      PROTEIN ELECTROPHORESIS      PROTEIN ELECTROPHORESIS + EDGAR, UR, 24HR      CBC WITH AUTOMATED DIFF and then decide on further w/u    5. Late onset Alzheimer's disease without behavioral disturbance G30.1 331.0  slowly progressing over time. Patient's son is managing to take care of him at home at this time. F02.80 294.10                 Diabetic issues reviewed with him: diabetic diet discussed in detail, and low cholesterol diet, weight control and daily exercise discussed. Follow-up Disposition:  Return in about 4 months (around 3/19/2019) for labs 1 week before. Reviewed plan of care.  Patient has provided input and agrees with goals.

## 2018-11-20 NOTE — TELEPHONE ENCOUNTER
Patient called to check the status of his lab results from 11/19/18. He asked to be contacted back at [phone].

## 2018-11-21 NOTE — TELEPHONE ENCOUNTER
Left message for patient and son lab results have not come back and patient and sone will get a call when the results have been finalized and reviewed by provider.

## 2018-11-27 NOTE — TELEPHONE ENCOUNTER
Trying to get intouch with son to go over lab work. Pt is still anemic and will refer to hematology for further evaluation.

## 2018-11-30 NOTE — TELEPHONE ENCOUNTER
Talked to son on cell [phone] that pt has anemia and will refer him to Hematology for further evaluation. Order placed for hematology.  Please send all recent labs

## 2019-01-01 ENCOUNTER — HOME CARE VISIT (OUTPATIENT)
Dept: SCHEDULING | Facility: HOME HEALTH | Age: 84
End: 2019-01-01
Payer: MEDICARE

## 2019-01-01 ENCOUNTER — HOSPITAL ENCOUNTER (INPATIENT)
Age: 84
LOS: 5 days | Discharge: HOSPICE | DRG: 840 | End: 2019-01-31
Attending: EMERGENCY MEDICINE | Admitting: INTERNAL MEDICINE
Payer: MEDICARE

## 2019-01-01 ENCOUNTER — HOME CARE VISIT (OUTPATIENT)
Dept: HOSPICE | Facility: HOSPICE | Age: 84
End: 2019-01-01
Payer: MEDICARE

## 2019-01-01 ENCOUNTER — PATIENT OUTREACH (OUTPATIENT)
Dept: FAMILY MEDICINE CLINIC | Age: 84
End: 2019-01-01

## 2019-01-01 ENCOUNTER — HOSPICE ADMISSION (OUTPATIENT)
Dept: HOSPICE | Facility: HOSPICE | Age: 84
End: 2019-01-01
Payer: MEDICARE

## 2019-01-01 ENCOUNTER — APPOINTMENT (OUTPATIENT)
Dept: CT IMAGING | Age: 84
DRG: 840 | End: 2019-01-01
Attending: EMERGENCY MEDICINE
Payer: MEDICARE

## 2019-01-01 ENCOUNTER — OFFICE VISIT (OUTPATIENT)
Dept: FAMILY MEDICINE CLINIC | Age: 84
End: 2019-01-01

## 2019-01-01 ENCOUNTER — APPOINTMENT (OUTPATIENT)
Dept: GENERAL RADIOLOGY | Age: 84
DRG: 840 | End: 2019-01-01
Attending: EMERGENCY MEDICINE
Payer: MEDICARE

## 2019-01-01 ENCOUNTER — APPOINTMENT (OUTPATIENT)
Dept: GENERAL RADIOLOGY | Age: 84
DRG: 840 | End: 2019-01-01
Attending: INTERNAL MEDICINE
Payer: MEDICARE

## 2019-01-01 VITALS
TEMPERATURE: 97.7 F | DIASTOLIC BLOOD PRESSURE: 85 MMHG | WEIGHT: 131.1 LBS | RESPIRATION RATE: 18 BRPM | HEART RATE: 83 BPM | BODY MASS INDEX: 18.81 KG/M2 | OXYGEN SATURATION: 99 % | SYSTOLIC BLOOD PRESSURE: 143 MMHG

## 2019-01-01 VITALS
SYSTOLIC BLOOD PRESSURE: 138 MMHG | TEMPERATURE: 98.5 F | HEART RATE: 116 BPM | DIASTOLIC BLOOD PRESSURE: 94 MMHG | OXYGEN SATURATION: 96 % | RESPIRATION RATE: 16 BRPM

## 2019-01-01 VITALS
BODY MASS INDEX: 18.8 KG/M2 | RESPIRATION RATE: 18 BRPM | HEART RATE: 70 BPM | WEIGHT: 131 LBS | TEMPERATURE: 97.7 F | SYSTOLIC BLOOD PRESSURE: 135 MMHG | OXYGEN SATURATION: 97 % | DIASTOLIC BLOOD PRESSURE: 80 MMHG

## 2019-01-01 VITALS
SYSTOLIC BLOOD PRESSURE: 150 MMHG | DIASTOLIC BLOOD PRESSURE: 108 MMHG | OXYGEN SATURATION: 98 % | RESPIRATION RATE: 18 BRPM | HEART RATE: 77 BPM | TEMPERATURE: 97.5 F

## 2019-01-01 VITALS
HEIGHT: 70 IN | HEART RATE: 72 BPM | DIASTOLIC BLOOD PRESSURE: 92 MMHG | RESPIRATION RATE: 20 BRPM | SYSTOLIC BLOOD PRESSURE: 130 MMHG | TEMPERATURE: 97.7 F | BODY MASS INDEX: 21.47 KG/M2 | WEIGHT: 150 LBS | OXYGEN SATURATION: 99 %

## 2019-01-01 VITALS — RESPIRATION RATE: 18 BRPM | OXYGEN SATURATION: 94 % | HEART RATE: 68 BPM

## 2019-01-01 VITALS
DIASTOLIC BLOOD PRESSURE: 84 MMHG | TEMPERATURE: 98.5 F | RESPIRATION RATE: 16 BRPM | OXYGEN SATURATION: 97 % | SYSTOLIC BLOOD PRESSURE: 138 MMHG | HEART RATE: 74 BPM

## 2019-01-01 VITALS
HEART RATE: 78 BPM | RESPIRATION RATE: 16 BRPM | OXYGEN SATURATION: 98 % | SYSTOLIC BLOOD PRESSURE: 118 MMHG | DIASTOLIC BLOOD PRESSURE: 64 MMHG | TEMPERATURE: 98.2 F

## 2019-01-01 VITALS — HEART RATE: 77 BPM | OXYGEN SATURATION: 97 %

## 2019-01-01 DIAGNOSIS — F02.80 LATE ONSET ALZHEIMER'S DISEASE WITHOUT BEHAVIORAL DISTURBANCE (HCC): ICD-10-CM

## 2019-01-01 DIAGNOSIS — R53.82 CHRONIC FATIGUE: ICD-10-CM

## 2019-01-01 DIAGNOSIS — N30.90 CYSTITIS: Primary | ICD-10-CM

## 2019-01-01 DIAGNOSIS — G30.1 LATE ONSET ALZHEIMER'S DISEASE WITHOUT BEHAVIORAL DISTURBANCE (HCC): ICD-10-CM

## 2019-01-01 DIAGNOSIS — R53.81 DEBILITY: ICD-10-CM

## 2019-01-01 DIAGNOSIS — Z51.5 COMFORT MEASURES ONLY STATUS: Primary | ICD-10-CM

## 2019-01-01 DIAGNOSIS — E83.52 HYPERCALCEMIA: ICD-10-CM

## 2019-01-01 DIAGNOSIS — Z71.89 ACP (ADVANCE CARE PLANNING): ICD-10-CM

## 2019-01-01 LAB
ALBUMIN 24H MFR UR ELPH: 31.6 %
ALBUMIN 24H MFR UR ELPH: 31.9 %
ALBUMIN SERPL ELPH-MCNC: 3.4 G/DL (ref 2.9–4.4)
ALBUMIN SERPL-MCNC: 2.7 G/DL (ref 3.4–5)
ALBUMIN SERPL-MCNC: 3 G/DL (ref 3.4–5)
ALBUMIN/GLOB SERPL: 0.4 {RATIO} (ref 0.8–1.7)
ALBUMIN/GLOB SERPL: 0.4 {RATIO} (ref 0.8–1.7)
ALBUMIN/GLOB SERPL: 0.5 {RATIO} (ref 0.7–1.7)
ALP SERPL-CCNC: 73 U/L (ref 45–117)
ALP SERPL-CCNC: 80 U/L (ref 45–117)
ALPHA1 GLOB 24H MFR UR ELPH: 2.2 %
ALPHA1 GLOB 24H MFR UR ELPH: 2.2 %
ALPHA1 GLOB SERPL ELPH-MCNC: 0.2 G/DL (ref 0–0.4)
ALPHA2 GLOB 24H MFR UR ELPH: 6.7 %
ALPHA2 GLOB 24H MFR UR ELPH: 6.9 %
ALPHA2 GLOB SERPL ELPH-MCNC: 0.6 G/DL (ref 0.4–1)
ALT SERPL-CCNC: 11 U/L (ref 16–61)
ALT SERPL-CCNC: 11 U/L (ref 16–61)
ANION GAP SERPL CALC-SCNC: 5 MMOL/L (ref 3–18)
ANION GAP SERPL CALC-SCNC: 7 MMOL/L (ref 3–18)
ANION GAP SERPL CALC-SCNC: 8 MMOL/L (ref 3–18)
ANION GAP SERPL CALC-SCNC: 9 MMOL/L (ref 3–18)
APPEARANCE UR: ABNORMAL
AST SERPL-CCNC: 18 U/L (ref 15–37)
AST SERPL-CCNC: 24 U/L (ref 15–37)
ATRIAL RATE: 65 BPM
B-GLOBULIN MFR UR ELPH: 41.2 %
B-GLOBULIN MFR UR ELPH: 52 %
B-GLOBULIN SERPL ELPH-MCNC: 5.3 G/DL (ref 0.7–1.3)
B2 MICROGLOB SERPL-MCNC: 5.1 MG/L (ref 0.6–2.4)
BACTERIA SPEC CULT: NORMAL
BACTERIA SPEC CULT: NORMAL
BACTERIA URNS QL MICRO: ABNORMAL /HPF
BASOPHILS # BLD: 0 K/UL (ref 0–0.1)
BASOPHILS NFR BLD: 0 % (ref 0–2)
BILIRUB SERPL-MCNC: 0.4 MG/DL (ref 0.2–1)
BILIRUB SERPL-MCNC: 0.4 MG/DL (ref 0.2–1)
BILIRUB UR QL: NEGATIVE
BUN SERPL-MCNC: 14 MG/DL (ref 7–18)
BUN SERPL-MCNC: 18 MG/DL (ref 7–18)
BUN SERPL-MCNC: 27 MG/DL (ref 7–18)
BUN SERPL-MCNC: 32 MG/DL (ref 7–18)
BUN SERPL-MCNC: 32 MG/DL (ref 7–18)
BUN SERPL-MCNC: 33 MG/DL (ref 7–18)
BUN/CREAT SERPL: 12 (ref 12–20)
BUN/CREAT SERPL: 13 (ref 12–20)
BUN/CREAT SERPL: 13 (ref 12–20)
BUN/CREAT SERPL: 14 (ref 12–20)
BUN/CREAT SERPL: 14 (ref 12–20)
BUN/CREAT SERPL: 15 (ref 12–20)
CA-I SERPL-SCNC: 1.32 MMOL/L (ref 1.12–1.32)
CA-I SERPL-SCNC: 1.45 MMOL/L (ref 1.12–1.32)
CA-I SERPL-SCNC: 1.72 MMOL/L (ref 1.12–1.32)
CALCIUM SERPL-MCNC: 10.1 MG/DL (ref 8.5–10.1)
CALCIUM SERPL-MCNC: 11.6 MG/DL (ref 8.5–10.1)
CALCIUM SERPL-MCNC: 13.2 MG/DL (ref 8.5–10.1)
CALCIUM SERPL-MCNC: 13.8 MG/DL (ref 8.5–10.1)
CALCIUM SERPL-MCNC: 14.3 MG/DL (ref 8.5–10.1)
CALCIUM SERPL-MCNC: 9.5 MG/DL (ref 8.5–10.1)
CALCULATED P AXIS, ECG09: 4 DEGREES
CALCULATED R AXIS, ECG10: 58 DEGREES
CALCULATED T AXIS, ECG11: 45 DEGREES
CHLORIDE SERPL-SCNC: 103 MMOL/L (ref 100–108)
CHLORIDE SERPL-SCNC: 105 MMOL/L (ref 100–108)
CHLORIDE SERPL-SCNC: 106 MMOL/L (ref 100–108)
CHLORIDE SERPL-SCNC: 106 MMOL/L (ref 100–108)
CHLORIDE SERPL-SCNC: 107 MMOL/L (ref 100–108)
CHLORIDE SERPL-SCNC: 110 MMOL/L (ref 100–108)
CK MB CFR SERPL CALC: NORMAL % (ref 0–4)
CK MB SERPL-MCNC: <1 NG/ML (ref 5–25)
CK SERPL-CCNC: 71 U/L (ref 39–308)
CO2 SERPL-SCNC: 25 MMOL/L (ref 21–32)
CO2 SERPL-SCNC: 26 MMOL/L (ref 21–32)
CO2 SERPL-SCNC: 28 MMOL/L (ref 21–32)
CO2 SERPL-SCNC: 29 MMOL/L (ref 21–32)
COLLECT DURATION TIME UR: 1440 HR
COLLECT DURATION TIME UR: 24 HR
COLOR UR: ABNORMAL
CREAT SERPL-MCNC: 0.99 MG/DL (ref 0.6–1.3)
CREAT SERPL-MCNC: 1.34 MG/DL (ref 0.6–1.3)
CREAT SERPL-MCNC: 1.79 MG/DL (ref 0.6–1.3)
CREAT SERPL-MCNC: 2.3 MG/DL (ref 0.6–1.3)
CREAT SERPL-MCNC: 2.47 MG/DL (ref 0.6–1.3)
CREAT SERPL-MCNC: 2.72 MG/DL (ref 0.6–1.3)
DIAGNOSIS, 93000: NORMAL
DIFFERENTIAL METHOD BLD: ABNORMAL
EOSINOPHIL # BLD: 0 K/UL (ref 0–0.4)
EOSINOPHIL NFR BLD: 0 % (ref 0–5)
EOSINOPHIL NFR BLD: 0 % (ref 0–5)
EOSINOPHIL NFR BLD: 1 % (ref 0–5)
EPITH CASTS URNS QL MICRO: ABNORMAL /LPF (ref 0–5)
ERYTHROCYTE [DISTWIDTH] IN BLOOD BY AUTOMATED COUNT: 12.6 % (ref 11.6–14.5)
ERYTHROCYTE [DISTWIDTH] IN BLOOD BY AUTOMATED COUNT: 12.7 % (ref 11.6–14.5)
ERYTHROCYTE [DISTWIDTH] IN BLOOD BY AUTOMATED COUNT: 12.9 % (ref 11.6–14.5)
FLUAV AG NPH QL IA: NEGATIVE
FLUBV AG NOSE QL IA: NEGATIVE
GAMMA GLOB 24H MFR UR ELPH: 18 %
GAMMA GLOB 24H MFR UR ELPH: 7.2 %
GAMMA GLOB SERPL ELPH-MCNC: 0.3 G/DL (ref 0.4–1.8)
GLOBULIN SER CALC-MCNC: 6.3 G/DL (ref 2.2–3.9)
GLOBULIN SER CALC-MCNC: 7.7 G/DL (ref 2–4)
GLOBULIN SER CALC-MCNC: 8.1 G/DL (ref 2–4)
GLUCOSE BLD STRIP.AUTO-MCNC: 101 MG/DL (ref 70–110)
GLUCOSE BLD STRIP.AUTO-MCNC: 107 MG/DL (ref 70–110)
GLUCOSE BLD STRIP.AUTO-MCNC: 110 MG/DL (ref 70–110)
GLUCOSE BLD STRIP.AUTO-MCNC: 90 MG/DL (ref 70–110)
GLUCOSE BLD STRIP.AUTO-MCNC: 96 MG/DL (ref 70–110)
GLUCOSE SERPL-MCNC: 100 MG/DL (ref 74–99)
GLUCOSE SERPL-MCNC: 103 MG/DL (ref 74–99)
GLUCOSE SERPL-MCNC: 83 MG/DL (ref 74–99)
GLUCOSE SERPL-MCNC: 83 MG/DL (ref 74–99)
GLUCOSE SERPL-MCNC: 84 MG/DL (ref 74–99)
GLUCOSE SERPL-MCNC: 95 MG/DL (ref 74–99)
GLUCOSE UR STRIP.AUTO-MCNC: NEGATIVE MG/DL
HCT VFR BLD AUTO: 33.1 % (ref 36–48)
HCT VFR BLD AUTO: 34 % (ref 36–48)
HCT VFR BLD AUTO: 34.5 % (ref 36–48)
HGB BLD-MCNC: 11 G/DL (ref 13–16)
HGB BLD-MCNC: 11 G/DL (ref 13–16)
HGB BLD-MCNC: 11.5 G/DL (ref 13–16)
HGB UR QL STRIP: ABNORMAL
HYALINE CASTS URNS QL MICRO: ABNORMAL /LPF (ref 0–2)
IGA SERPL-MCNC: 5184 MG/DL (ref 61–437)
IGG SERPL-MCNC: 403 MG/DL (ref 700–1600)
IGM SERPL-MCNC: 19 MG/DL (ref 15–143)
INR PPP: 1.3 (ref 0.8–1.2)
INTERPRETATION UR IFE-IMP: ABNORMAL
INTERPRETATION UR IFE-IMP: ABNORMAL
KAPPA LC FREE SER-MCNC: 87.4 MG/L (ref 3.3–19.4)
KAPPA LC FREE/LAMBDA FREE SER: 15.07 {RATIO} (ref 0.26–1.65)
KETONES UR QL STRIP.AUTO: NEGATIVE MG/DL
LACTATE BLD-SCNC: 1.28 MMOL/L (ref 0.4–2)
LACTATE SERPL-SCNC: 1.9 MMOL/L (ref 0.4–2)
LACTATE SERPL-SCNC: 2.1 MMOL/L (ref 0.4–2)
LAMBDA LC FREE SERPL-MCNC: 5.8 MG/L (ref 5.7–26.3)
LEUKOCYTE ESTERASE UR QL STRIP.AUTO: ABNORMAL
LYMPHOCYTES # BLD: 0.6 K/UL (ref 0.9–3.6)
LYMPHOCYTES # BLD: 0.9 K/UL (ref 0.9–3.6)
LYMPHOCYTES # BLD: 0.9 K/UL (ref 0.9–3.6)
LYMPHOCYTES NFR BLD: 11 % (ref 21–52)
LYMPHOCYTES NFR BLD: 16 % (ref 21–52)
LYMPHOCYTES NFR BLD: 20 % (ref 21–52)
M PROTEIN 24H MFR UR ELPH: 16.1 %
M PROTEIN 24H MFR UR ELPH: 19.5 %
M PROTEIN 24H UR ELPH-MRATE: 153 MG/24 HR
M PROTEIN 24H UR ELPH-MRATE: 257 MG/24 HR
M PROTEIN SERPL ELPH-MCNC: 4 G/DL
MAGNESIUM SERPL-MCNC: 1.7 MG/DL (ref 1.6–2.6)
MAGNESIUM SERPL-MCNC: 1.9 MG/DL (ref 1.6–2.6)
MCH RBC QN AUTO: 28.7 PG (ref 24–34)
MCH RBC QN AUTO: 29.1 PG (ref 24–34)
MCH RBC QN AUTO: 29.3 PG (ref 24–34)
MCHC RBC AUTO-ENTMCNC: 32.4 G/DL (ref 31–37)
MCHC RBC AUTO-ENTMCNC: 33.2 G/DL (ref 31–37)
MCHC RBC AUTO-ENTMCNC: 33.3 G/DL (ref 31–37)
MCV RBC AUTO: 87.6 FL (ref 74–97)
MCV RBC AUTO: 88 FL (ref 74–97)
MCV RBC AUTO: 88.8 FL (ref 74–97)
MONOCYTES # BLD: 0.3 K/UL (ref 0.05–1.2)
MONOCYTES # BLD: 0.4 K/UL (ref 0.05–1.2)
MONOCYTES # BLD: 0.4 K/UL (ref 0.05–1.2)
MONOCYTES NFR BLD: 6 % (ref 3–10)
MONOCYTES NFR BLD: 7 % (ref 3–10)
MONOCYTES NFR BLD: 8 % (ref 3–10)
MUCOUS THREADS URNS QL MICRO: ABNORMAL /LPF
NEUTS SEG # BLD: 3.2 K/UL (ref 1.8–8)
NEUTS SEG # BLD: 4.4 K/UL (ref 1.8–8)
NEUTS SEG # BLD: 4.6 K/UL (ref 1.8–8)
NEUTS SEG NFR BLD: 73 % (ref 40–73)
NEUTS SEG NFR BLD: 78 % (ref 40–73)
NEUTS SEG NFR BLD: 80 % (ref 40–73)
NITRITE UR QL STRIP.AUTO: NEGATIVE
NOTE, 149533: ABNORMAL
NOTE, 149533: ABNORMAL
OTHER,OTHU: ABNORMAL
P-R INTERVAL, ECG05: 146 MS
PH UR STRIP: 5 [PH] (ref 5–8)
PHOSPHATE SERPL-MCNC: 1.8 MG/DL (ref 2.5–4.9)
PHOSPHATE SERPL-MCNC: 3.2 MG/DL (ref 2.5–4.9)
PLATELET # BLD AUTO: 102 K/UL (ref 135–420)
PLATELET # BLD AUTO: 132 K/UL (ref 135–420)
PLATELET # BLD AUTO: 139 K/UL (ref 135–420)
PMV BLD AUTO: 10.8 FL (ref 9.2–11.8)
PMV BLD AUTO: 11.7 FL (ref 9.2–11.8)
PMV BLD AUTO: 11.8 FL (ref 9.2–11.8)
POTASSIUM SERPL-SCNC: 3 MMOL/L (ref 3.5–5.5)
POTASSIUM SERPL-SCNC: 3.5 MMOL/L (ref 3.5–5.5)
POTASSIUM SERPL-SCNC: 3.6 MMOL/L (ref 3.5–5.5)
POTASSIUM SERPL-SCNC: 3.6 MMOL/L (ref 3.5–5.5)
POTASSIUM SERPL-SCNC: 3.9 MMOL/L (ref 3.5–5.5)
POTASSIUM SERPL-SCNC: 3.9 MMOL/L (ref 3.5–5.5)
PROT 24H UR-MRATE: 1598 MG/24 HR (ref 30–150)
PROT 24H UR-MRATE: 783 MG/24 HR (ref 30–150)
PROT PATTERN SERPL IFE-IMP: ABNORMAL
PROT SERPL-MCNC: 10.4 G/DL (ref 6.4–8.2)
PROT SERPL-MCNC: 11.1 G/DL (ref 6.4–8.2)
PROT SERPL-MCNC: 9.7 G/DL (ref 6–8.5)
PROT UR STRIP-MCNC: NEGATIVE MG/DL
PROT UR-MCNC: 35.5 MG/DL
PROT UR-MCNC: 68.7 MG/DL
PROTHROMBIN TIME: 16.2 SEC (ref 11.5–15.2)
Q-T INTERVAL, ECG07: 392 MS
QRS DURATION, ECG06: 92 MS
QTC CALCULATION (BEZET), ECG08: 407 MS
RBC # BLD AUTO: 3.78 M/UL (ref 4.7–5.5)
RBC # BLD AUTO: 3.83 M/UL (ref 4.7–5.5)
RBC # BLD AUTO: 3.92 M/UL (ref 4.7–5.5)
RBC #/AREA URNS HPF: ABNORMAL /HPF (ref 0–5)
SERVICE CMNT-IMP: NORMAL
SERVICE CMNT-IMP: NORMAL
SODIUM SERPL-SCNC: 136 MMOL/L (ref 136–145)
SODIUM SERPL-SCNC: 138 MMOL/L (ref 136–145)
SODIUM SERPL-SCNC: 141 MMOL/L (ref 136–145)
SODIUM SERPL-SCNC: 142 MMOL/L (ref 136–145)
SODIUM SERPL-SCNC: 142 MMOL/L (ref 136–145)
SODIUM SERPL-SCNC: 145 MMOL/L (ref 136–145)
SP GR UR REFRACTOMETRY: 1.02 (ref 1–1.03)
SPECIMEN VOL ?TM UR: 1140 ML
SPECIMEN VOL ?TM UR: 4500 ML
UROBILINOGEN UR QL STRIP.AUTO: 1 EU/DL (ref 0.2–1)
VANCOMYCIN SERPL-MCNC: 18.7 UG/ML (ref 5–40)
VANCOMYCIN SERPL-MCNC: 19.7 UG/ML (ref 5–40)
VENTRICULAR RATE, ECG03: 65 BPM
WBC # BLD AUTO: 4.4 K/UL (ref 4.6–13.2)
WBC # BLD AUTO: 5.4 K/UL (ref 4.6–13.2)
WBC # BLD AUTO: 5.8 K/UL (ref 4.6–13.2)
WBC URNS QL MICRO: ABNORMAL /HPF (ref 0–4)

## 2019-01-01 PROCEDURE — G0156 HHCP-SVS OF AIDE,EA 15 MIN: HCPCS

## 2019-01-01 PROCEDURE — 71045 X-RAY EXAM CHEST 1 VIEW: CPT

## 2019-01-01 PROCEDURE — 0651 HSPC ROUTINE HOME CARE

## 2019-01-01 PROCEDURE — G0155 HHCP-SVS OF CSW,EA 15 MIN: HCPCS

## 2019-01-01 PROCEDURE — G0299 HHS/HOSPICE OF RN EA 15 MIN: HCPCS

## 2019-01-01 PROCEDURE — 65270000029 HC RM PRIVATE

## 2019-01-01 PROCEDURE — 83735 ASSAY OF MAGNESIUM: CPT

## 2019-01-01 PROCEDURE — A4927 NON-STERILE GLOVES: HCPCS

## 2019-01-01 PROCEDURE — 51798 US URINE CAPACITY MEASURE: CPT

## 2019-01-01 PROCEDURE — 36415 COLL VENOUS BLD VENIPUNCTURE: CPT

## 2019-01-01 PROCEDURE — 74011000258 HC RX REV CODE- 258: Performed by: INTERNAL MEDICINE

## 2019-01-01 PROCEDURE — 85610 PROTHROMBIN TIME: CPT

## 2019-01-01 PROCEDURE — 76450000000

## 2019-01-01 PROCEDURE — 99285 EMERGENCY DEPT VISIT HI MDM: CPT

## 2019-01-01 PROCEDURE — 74011250636 HC RX REV CODE- 250/636: Performed by: INTERNAL MEDICINE

## 2019-01-01 PROCEDURE — 74011000258 HC RX REV CODE- 258: Performed by: EMERGENCY MEDICINE

## 2019-01-01 PROCEDURE — 83605 ASSAY OF LACTIC ACID: CPT

## 2019-01-01 PROCEDURE — A9270 NON-COVERED ITEM OR SERVICE: HCPCS

## 2019-01-01 PROCEDURE — 74011250637 HC RX REV CODE- 250/637: Performed by: HOSPITALIST

## 2019-01-01 PROCEDURE — 74011250636 HC RX REV CODE- 250/636: Performed by: EMERGENCY MEDICINE

## 2019-01-01 PROCEDURE — 84165 PROTEIN E-PHORESIS SERUM: CPT

## 2019-01-01 PROCEDURE — 77030005538 HC CATH URETH FOL44 BARD -B

## 2019-01-01 PROCEDURE — T4526 ADULT SIZE PULL-ON MED: HCPCS

## 2019-01-01 PROCEDURE — 74011250637 HC RX REV CODE- 250/637: Performed by: INTERNAL MEDICINE

## 2019-01-01 PROCEDURE — HHS10554 SHAMPOO/BODY WASH 8 OZ ALOE VESTA

## 2019-01-01 PROCEDURE — 82330 ASSAY OF CALCIUM: CPT

## 2019-01-01 PROCEDURE — 96367 TX/PROPH/DG ADDL SEQ IV INF: CPT

## 2019-01-01 PROCEDURE — 86335 IMMUNFIX E-PHORSIS/URINE/CSF: CPT

## 2019-01-01 PROCEDURE — T4522 ADULT SIZE BRIEF/DIAPER MED: HCPCS

## 2019-01-01 PROCEDURE — 80202 ASSAY OF VANCOMYCIN: CPT

## 2019-01-01 PROCEDURE — 92610 EVALUATE SWALLOWING FUNCTION: CPT

## 2019-01-01 PROCEDURE — 82784 ASSAY IGA/IGD/IGG/IGM EACH: CPT

## 2019-01-01 PROCEDURE — 81001 URINALYSIS AUTO W/SCOPE: CPT

## 2019-01-01 PROCEDURE — 0HBRXZZ EXCISION OF TOE NAIL, EXTERNAL APPROACH: ICD-10-PCS | Performed by: INTERNAL MEDICINE

## 2019-01-01 PROCEDURE — 80053 COMPREHEN METABOLIC PANEL: CPT

## 2019-01-01 PROCEDURE — T4541 LARGE DISPOSABLE UNDERPAD: HCPCS

## 2019-01-01 PROCEDURE — 82553 CREATINE MB FRACTION: CPT

## 2019-01-01 PROCEDURE — 82232 ASSAY OF BETA-2 PROTEIN: CPT

## 2019-01-01 PROCEDURE — 80048 BASIC METABOLIC PNL TOTAL CA: CPT

## 2019-01-01 PROCEDURE — 3336500001 HSPC ELECTION

## 2019-01-01 PROCEDURE — 0HBQXZZ EXCISION OF FINGER NAIL, EXTERNAL APPROACH: ICD-10-PCS | Performed by: INTERNAL MEDICINE

## 2019-01-01 PROCEDURE — T4523 ADULT SIZE BRIEF/DIAPER LG: HCPCS

## 2019-01-01 PROCEDURE — 85025 COMPLETE CBC W/AUTO DIFF WBC: CPT

## 2019-01-01 PROCEDURE — 93005 ELECTROCARDIOGRAM TRACING: CPT

## 2019-01-01 PROCEDURE — 83883 ASSAY NEPHELOMETRY NOT SPEC: CPT

## 2019-01-01 PROCEDURE — 3331090004 HSPC SERVICE INTENSITY ADD-ON

## 2019-01-01 PROCEDURE — 87040 BLOOD CULTURE FOR BACTERIA: CPT

## 2019-01-01 PROCEDURE — HOSPICE MEDICATION HC HH HOSPICE MEDICATION

## 2019-01-01 PROCEDURE — 51702 INSERT TEMP BLADDER CATH: CPT

## 2019-01-01 PROCEDURE — 82962 GLUCOSE BLOOD TEST: CPT

## 2019-01-01 PROCEDURE — 77075 RADEX OSSEOUS SURVEY COMPL: CPT

## 2019-01-01 PROCEDURE — 77030011256 HC DRSG MEPILEX <16IN NO BORD MOLN -A

## 2019-01-01 PROCEDURE — 87804 INFLUENZA ASSAY W/OPTIC: CPT

## 2019-01-01 PROCEDURE — 70450 CT HEAD/BRAIN W/O DYE: CPT

## 2019-01-01 PROCEDURE — 84100 ASSAY OF PHOSPHORUS: CPT

## 2019-01-01 PROCEDURE — 96361 HYDRATE IV INFUSION ADD-ON: CPT

## 2019-01-01 PROCEDURE — 92526 ORAL FUNCTION THERAPY: CPT

## 2019-01-01 PROCEDURE — 77030037875 HC DRSG MEPILEX <16IN BORD MOLN -A

## 2019-01-01 PROCEDURE — 96365 THER/PROPH/DIAG IV INF INIT: CPT

## 2019-01-01 PROCEDURE — 11721 DEBRIDE NAIL 6 OR MORE: CPT

## 2019-01-01 PROCEDURE — A6250 SKIN SEAL PROTECT MOISTURIZR: HCPCS

## 2019-01-01 RX ORDER — SULFAMETHOXAZOLE AND TRIMETHOPRIM 800; 160 MG/1; MG/1
1 TABLET ORAL 2 TIMES DAILY
Qty: 6 TAB | Refills: 1 | Status: SHIPPED | OUTPATIENT
Start: 2019-01-01 | End: 2019-01-01

## 2019-01-01 RX ORDER — LORAZEPAM 2 MG/ML
1 CONCENTRATE ORAL
Qty: 30 ML | Refills: 0 | Status: SHIPPED | OUTPATIENT
Start: 2019-01-01

## 2019-01-01 RX ORDER — SODIUM CHLORIDE 0.9 % (FLUSH) 0.9 %
5-10 SYRINGE (ML) INJECTION AS NEEDED
Status: DISCONTINUED | OUTPATIENT
Start: 2019-01-01 | End: 2019-01-01 | Stop reason: HOSPADM

## 2019-01-01 RX ORDER — HEPARIN SODIUM 5000 [USP'U]/ML
5000 INJECTION, SOLUTION INTRAVENOUS; SUBCUTANEOUS EVERY 8 HOURS
Status: DISCONTINUED | OUTPATIENT
Start: 2019-01-01 | End: 2019-01-01

## 2019-01-01 RX ORDER — HYDRALAZINE HYDROCHLORIDE 20 MG/ML
10 INJECTION INTRAMUSCULAR; INTRAVENOUS
Status: DISCONTINUED | OUTPATIENT
Start: 2019-01-01 | End: 2019-01-01 | Stop reason: HOSPADM

## 2019-01-01 RX ORDER — VANCOMYCIN/0.9 % SOD CHLORIDE 1 G/100 ML
1000 PLASTIC BAG, INJECTION (ML) INTRAVENOUS ONCE
Status: COMPLETED | OUTPATIENT
Start: 2019-01-01 | End: 2019-01-01

## 2019-01-01 RX ORDER — MORPHINE SULFATE 100 MG/5ML
5-10 SOLUTION ORAL
Status: DISCONTINUED | OUTPATIENT
Start: 2019-01-01 | End: 2019-01-01 | Stop reason: HOSPADM

## 2019-01-01 RX ORDER — AMLODIPINE BESYLATE 5 MG/1
5 TABLET ORAL DAILY
Status: DISCONTINUED | OUTPATIENT
Start: 2019-01-01 | End: 2019-01-01

## 2019-01-01 RX ORDER — POTASSIUM CHLORIDE 20 MEQ/1
40 TABLET, EXTENDED RELEASE ORAL EVERY 4 HOURS
Status: DISPENSED | OUTPATIENT
Start: 2019-01-01 | End: 2019-01-01

## 2019-01-01 RX ORDER — SODIUM CHLORIDE 450 MG/100ML
100 INJECTION, SOLUTION INTRAVENOUS CONTINUOUS
Status: DISCONTINUED | OUTPATIENT
Start: 2019-01-01 | End: 2019-01-01

## 2019-01-01 RX ORDER — LORAZEPAM 2 MG/ML
1 CONCENTRATE ORAL
Status: DISCONTINUED | OUTPATIENT
Start: 2019-01-01 | End: 2019-01-01 | Stop reason: HOSPADM

## 2019-01-01 RX ORDER — FUROSEMIDE 10 MG/ML
40 INJECTION INTRAMUSCULAR; INTRAVENOUS
Status: COMPLETED | OUTPATIENT
Start: 2019-01-01 | End: 2019-01-01

## 2019-01-01 RX ORDER — AMLODIPINE BESYLATE 10 MG/1
10 TABLET ORAL DAILY
Status: DISCONTINUED | OUTPATIENT
Start: 2019-01-01 | End: 2019-01-01 | Stop reason: HOSPADM

## 2019-01-01 RX ORDER — PETROLATUM 42 G/100G
OINTMENT TOPICAL 2 TIMES DAILY
Status: DISCONTINUED | OUTPATIENT
Start: 2019-01-01 | End: 2019-01-01 | Stop reason: HOSPADM

## 2019-01-01 RX ORDER — VANCOMYCIN/0.9 % SOD CHLORIDE 1.5G/250ML
1500 PLASTIC BAG, INJECTION (ML) INTRAVENOUS ONCE
Status: COMPLETED | OUTPATIENT
Start: 2019-01-01 | End: 2019-01-01

## 2019-01-01 RX ORDER — SODIUM CHLORIDE 9 MG/ML
150 INJECTION, SOLUTION INTRAVENOUS CONTINUOUS
Status: DISCONTINUED | OUTPATIENT
Start: 2019-01-01 | End: 2019-01-01

## 2019-01-01 RX ORDER — PETROLATUM 42 G/100G
OINTMENT TOPICAL AS NEEDED
Status: DISCONTINUED | OUTPATIENT
Start: 2019-01-01 | End: 2019-01-01

## 2019-01-01 RX ORDER — ASPIRIN 325 MG
325 TABLET ORAL DAILY
Status: DISCONTINUED | OUTPATIENT
Start: 2019-01-01 | End: 2019-01-01

## 2019-01-01 RX ORDER — MORPHINE SULFATE 100 MG/5ML
5-10 SOLUTION ORAL
Qty: 30 ML | Refills: 0 | Status: SHIPPED | OUTPATIENT
Start: 2019-01-01

## 2019-01-01 RX ORDER — VANCOMYCIN/0.9 % SOD CHLORIDE 1 G/100 ML
1000 PLASTIC BAG, INJECTION (ML) INTRAVENOUS ONCE
Status: DISCONTINUED | OUTPATIENT
Start: 2019-01-01 | End: 2019-01-01 | Stop reason: DRUGHIGH

## 2019-01-01 RX ORDER — SODIUM CHLORIDE 9 MG/ML
125 INJECTION, SOLUTION INTRAVENOUS CONTINUOUS
Status: DISPENSED | OUTPATIENT
Start: 2019-01-01 | End: 2019-01-01

## 2019-01-01 RX ADMIN — Medication: at 08:51

## 2019-01-01 RX ADMIN — PIPERACILLIN SODIUM,TAZOBACTAM SODIUM 2.25 G: 2; .25 INJECTION, POWDER, FOR SOLUTION INTRAVENOUS at 20:15

## 2019-01-01 RX ADMIN — HEPARIN SODIUM 5000 UNITS: 5000 INJECTION INTRAVENOUS; SUBCUTANEOUS at 20:15

## 2019-01-01 RX ADMIN — AMLODIPINE BESYLATE 10 MG: 10 TABLET ORAL at 09:32

## 2019-01-01 RX ADMIN — HEPARIN SODIUM 5000 UNITS: 5000 INJECTION INTRAVENOUS; SUBCUTANEOUS at 12:20

## 2019-01-01 RX ADMIN — Medication: at 09:33

## 2019-01-01 RX ADMIN — SODIUM CHLORIDE 779 ML: 900 INJECTION, SOLUTION INTRAVENOUS at 15:00

## 2019-01-01 RX ADMIN — Medication: at 21:21

## 2019-01-01 RX ADMIN — AMLODIPINE BESYLATE 10 MG: 10 TABLET ORAL at 09:27

## 2019-01-01 RX ADMIN — VANCOMYCIN HYDROCHLORIDE 1000 MG: 10 INJECTION, POWDER, LYOPHILIZED, FOR SOLUTION INTRAVENOUS at 12:09

## 2019-01-01 RX ADMIN — PIPERACILLIN SODIUM,TAZOBACTAM SODIUM 2.25 G: 2; .25 INJECTION, POWDER, FOR SOLUTION INTRAVENOUS at 02:12

## 2019-01-01 RX ADMIN — AMLODIPINE BESYLATE 10 MG: 10 TABLET ORAL at 09:38

## 2019-01-01 RX ADMIN — Medication: at 09:28

## 2019-01-01 RX ADMIN — SODIUM CHLORIDE 125 ML/HR: 900 INJECTION, SOLUTION INTRAVENOUS at 12:09

## 2019-01-01 RX ADMIN — HEPARIN SODIUM 5000 UNITS: 5000 INJECTION INTRAVENOUS; SUBCUTANEOUS at 21:34

## 2019-01-01 RX ADMIN — HEPARIN SODIUM 5000 UNITS: 5000 INJECTION INTRAVENOUS; SUBCUTANEOUS at 20:13

## 2019-01-01 RX ADMIN — Medication: at 21:29

## 2019-01-01 RX ADMIN — SODIUM CHLORIDE 125 ML/HR: 900 INJECTION, SOLUTION INTRAVENOUS at 21:08

## 2019-01-01 RX ADMIN — SODIUM CHLORIDE 125 ML/HR: 900 INJECTION, SOLUTION INTRAVENOUS at 01:54

## 2019-01-01 RX ADMIN — ASPIRIN 325 MG ORAL TABLET 325 MG: 325 PILL ORAL at 09:17

## 2019-01-01 RX ADMIN — SODIUM CHLORIDE 100 ML/HR: 450 INJECTION, SOLUTION INTRAVENOUS at 13:30

## 2019-01-01 RX ADMIN — SODIUM CHLORIDE 1000 ML: 900 INJECTION, SOLUTION INTRAVENOUS at 12:30

## 2019-01-01 RX ADMIN — PIPERACILLIN SODIUM,TAZOBACTAM SODIUM 2.25 G: 2; .25 INJECTION, POWDER, FOR SOLUTION INTRAVENOUS at 08:51

## 2019-01-01 RX ADMIN — Medication 10 ML: at 14:00

## 2019-01-01 RX ADMIN — FUROSEMIDE 40 MG: 10 INJECTION, SOLUTION INTRAVENOUS at 20:13

## 2019-01-01 RX ADMIN — Medication: at 21:45

## 2019-01-01 RX ADMIN — PIPERACILLIN SODIUM,TAZOBACTAM SODIUM 2.25 G: 2; .25 INJECTION, POWDER, FOR SOLUTION INTRAVENOUS at 14:10

## 2019-01-01 RX ADMIN — ASPIRIN 325 MG ORAL TABLET 325 MG: 325 PILL ORAL at 08:50

## 2019-01-01 RX ADMIN — PIPERACILLIN SODIUM,TAZOBACTAM SODIUM 2.25 G: 2; .25 INJECTION, POWDER, FOR SOLUTION INTRAVENOUS at 09:12

## 2019-01-01 RX ADMIN — SODIUM CHLORIDE 150 ML/HR: 900 INJECTION, SOLUTION INTRAVENOUS at 08:50

## 2019-01-01 RX ADMIN — SODIUM CHLORIDE 100 ML/HR: 450 INJECTION, SOLUTION INTRAVENOUS at 06:10

## 2019-01-01 RX ADMIN — HEPARIN SODIUM 5000 UNITS: 5000 INJECTION INTRAVENOUS; SUBCUTANEOUS at 05:01

## 2019-01-01 RX ADMIN — VANCOMYCIN HYDROCHLORIDE 1500 MG: 10 INJECTION, POWDER, LYOPHILIZED, FOR SOLUTION INTRAVENOUS at 15:00

## 2019-01-01 RX ADMIN — HEPARIN SODIUM 5000 UNITS: 5000 INJECTION INTRAVENOUS; SUBCUTANEOUS at 05:51

## 2019-01-01 RX ADMIN — PIPERACILLIN SODIUM,TAZOBACTAM SODIUM 2.25 G: 2; .25 INJECTION, POWDER, FOR SOLUTION INTRAVENOUS at 14:16

## 2019-01-01 RX ADMIN — ASPIRIN 325 MG ORAL TABLET 325 MG: 325 PILL ORAL at 09:38

## 2019-01-01 RX ADMIN — SODIUM CHLORIDE 1000 ML: 900 INJECTION, SOLUTION INTRAVENOUS at 20:13

## 2019-01-01 RX ADMIN — ASPIRIN 325 MG ORAL TABLET 325 MG: 325 PILL ORAL at 09:27

## 2019-01-01 RX ADMIN — PIPERACILLIN SODIUM,TAZOBACTAM SODIUM 2.25 G: 2; .25 INJECTION, POWDER, FOR SOLUTION INTRAVENOUS at 20:13

## 2019-01-01 RX ADMIN — PIPERACILLIN SODIUM,TAZOBACTAM SODIUM 2.25 G: 2; .25 INJECTION, POWDER, FOR SOLUTION INTRAVENOUS at 02:06

## 2019-01-01 RX ADMIN — AMLODIPINE BESYLATE 10 MG: 10 TABLET ORAL at 08:50

## 2019-01-01 RX ADMIN — PIPERACILLIN SODIUM,TAZOBACTAM SODIUM 2.25 G: 2; .25 INJECTION, POWDER, FOR SOLUTION INTRAVENOUS at 01:54

## 2019-01-01 RX ADMIN — PIPERACILLIN SODIUM,TAZOBACTAM SODIUM 2.25 G: 2; .25 INJECTION, POWDER, FOR SOLUTION INTRAVENOUS at 21:38

## 2019-01-01 RX ADMIN — Medication: at 09:39

## 2019-01-01 RX ADMIN — AMLODIPINE BESYLATE 5 MG: 10 TABLET ORAL at 09:17

## 2019-01-01 RX ADMIN — ZOLEDRONIC ACID 4 MG: 0.04 INJECTION, SOLUTION INTRAVENOUS at 20:41

## 2019-01-01 RX ADMIN — Medication: at 06:43

## 2019-01-01 RX ADMIN — PIPERACILLIN SODIUM,TAZOBACTAM SODIUM 2.25 G: 2; .25 INJECTION, POWDER, FOR SOLUTION INTRAVENOUS at 14:46

## 2019-01-01 RX ADMIN — VANCOMYCIN HYDROCHLORIDE 1000 MG: 10 INJECTION, POWDER, LYOPHILIZED, FOR SOLUTION INTRAVENOUS at 22:55

## 2019-01-22 NOTE — PROGRESS NOTES
Patient is in the office today for uti, patient is not able to provide a urine sample at this time. 1. Have you been to the ER, urgent care clinic since your last visit? Hospitalized since your last visit? No    2. Have you seen or consulted any other health care providers outside of the [address] [place] [address] since your last visit? Include any pap smears or colon screening.  No

## 2019-01-22 NOTE — PATIENT INSTRUCTIONS

## 2019-01-23 NOTE — PROGRESS NOTES
Harish Aldridge is a 81 y.o.  male and presents with Urinary Frequency; Fatigue; and Anorexia      SUBJECTIVE:  Pt is brought in by his son who says for the last week patient has not been eating much has been much less active. He normally would walk around the house and take out the garbage but is now just sleeping in his room and not eating much. He had similar symptoms when he had a urinary tract infection in the past.  Patient was unable to give us a urine today. Respiratory ROS: negative for - shortness of breath  Cardiovascular ROS: negative for - chest pain    Current Outpatient Medications   Medication Sig    trimethoprim-sulfamethoxazole (BACTRIM DS, SEPTRA DS) 160-800 mg per tablet Take 1 Tab by mouth two (2) times a day for 3 days.  amLODIPine (NORVASC) 5 mg tablet Take 1 Tab by mouth daily.  naproxen sodium (ALEVE) 220 mg tablet Take 220 mg by mouth daily as needed for Pain.  meclizine (ANTIVERT) 25 mg tablet Take 1 Tab by mouth three (3) times daily as needed for up to 3 doses. (Patient taking differently: Take 1 Tab by mouth three (3) times daily as needed for Dizziness.)    aspirin (ASPIRIN) 325 mg tablet Take 325 mg by mouth daily. No current facility-administered medications for this visit. OBJECTIVE:  ill-appearing  Visit Vitals  BP (!) 130/92 (BP 1 Location: Left arm, BP Patient Position: Sitting)   Pulse 72   Temp 97.7 °F (36.5 °C) (Oral)   Resp 20   Ht 5' 10\" (1.778 m)   Wt 150 lb (68 kg)   SpO2 99%   BMI 21.52 kg/m²      well developed and well nourished  Chest - clear to auscultation, no wheezes, rales or rhonchi, symmetric air entry  Heart - normal rate, regular rhythm, normal S1, S2, no murmurs, rubs, clicks or gallops  Extremities - peripheral pulses normal, no pedal edema, no clubbing or cyanosis          Assessment/Plan      ICD-10-CM ICD-9-CM    1. Cystitis N30.90 595.9 Possible.  Will empirically treat with trimethoprim-sulfamethoxazole (BACTRIM DS, SEPTRA DS) 160-800 mg per tablet. Patient's son will try to get a urine specimen at home and bring it in   2. Chronic fatigue R53.82 780.79  may be due to #1 but if not improving in the next couple of days patient's son was advised to take him to the emergency room for further evaluation. Follow-up Disposition:  Return if symptoms worsen or fail to improve. Reviewed plan of care. Patient has provided input and agrees with goals.

## 2019-01-26 PROBLEM — N17.9 AKI (ACUTE KIDNEY INJURY) (HCC): Status: ACTIVE | Noted: 2019-01-01

## 2019-01-26 PROBLEM — E83.52 HYPERCALCEMIA: Status: ACTIVE | Noted: 2019-01-01

## 2019-01-26 PROBLEM — R50.9 FEVER: Status: ACTIVE | Noted: 2019-01-01

## 2019-01-26 NOTE — ED PROVIDER NOTES
EMERGENCY DEPARTMENT HISTORY AND PHYSICAL EXAM 
 
12:38 PM 
 
 
Date: 1/26/2019 Patient Name: Lucia Denson History of Presenting Illness Chief Complaint Patient presents with  Altered mental status History Provided By: Patient's Son Chief Complaint: Confusion Duration:  Days Timing:  Constant Location: n/a Quality: n/a Severity: Moderate Modifying Factors: n/a Associated Symptoms: fever, loss of appetite Additional History (Context): Lucia Denson is a 81 y.o. male with dementia, HTN and other noted PMHx who presents with constant confusion, onset x1 week. Associated Sx include a fever of 103, measured today by EMS prior to arrival, and a loss of appetite. Around this time last year, the pt was treated for a UTI, and his Sx were similar. No other concerns or symptoms at this time. PCP: Bill Gómez MD 
 
  
 
Past History Past Medical History:  
Diagnosis Date  CKD (chronic kidney disease) stage 2, GFR 60-89 ml/min 3/29/2010  Dementia  High blood cholesterol level 3/29/2010  
 HTN (hypertension), benign 3/29/2010  Hyperglycemia 3/29/2010  Microalbuminuria 12/14/2010  Vertigo 3/29/2010 No past surgical history on file. Social History Socioeconomic History  Marital status:  Spouse name: Not on file  Number of children: Not on file  Years of education: Not on file  Highest education level: Not on file Social Needs  Financial resource strain: Not on file  Food insecurity - worry: Not on file  Food insecurity - inability: Not on file  Transportation needs - medical: Not on file  Transportation needs - non-medical: Not on file Occupational History  Not on file Tobacco Use  Smoking status: Never Smoker  Smokeless tobacco: Never Used Substance and Sexual Activity  Alcohol use: No  
 Drug use: No  
 Sexual activity: Not on file Other Topics Concern  Not on file Social History Narrative  Not on file Review of Systems Review of Systems Constitutional: Positive for appetite change and fever. Psychiatric/Behavioral: Positive for confusion. All other systems reviewed and are negative. Physical Exam  
 
 
 
Physical Exam  
Constitutional: He appears well-developed. Frail and eldelry HENT:  
Head: Normocephalic and atraumatic. Eyes: EOM are normal. Pupils are equal, round, and reactive to light. Neck: Normal range of motion. Neck supple. Cardiovascular: Normal rate, regular rhythm and normal heart sounds. Exam reveals no friction rub. No murmur heard. Pulmonary/Chest: Effort normal and breath sounds normal. No respiratory distress. He has no wheezes. Abdominal: Soft. He exhibits no distension. There is no tenderness. There is no rebound and no guarding. Musculoskeletal: Normal range of motion. Neurological: He is alert. aox1 baseline dementia Skin: Skin is warm and dry. Psychiatric: He has a normal mood and affect. His behavior is normal. Thought content normal.  
 
 
 
Diagnostic Study Results EKG nsr at 65 nl axis. Nl int. No uriel/d. No hypertrophy Medical Decision Making I am the first provider for this patient. I reviewed the vital signs, available nursing notes, past medical history, past surgical history, family history and social history. Vital Signs-Reviewed the patient's vital signs. ED Course: Progress Notes, Reevaluation, and Consults: 
 
Provider Notes (Medical Decision Making): 1. Fever 2. Hypercalcemia. 3. humphrey Baseline dementia; no meningismus; do not think he needs an LP. D/w dr Kristyn Bautista;  
 
 
Diagnosis Clinical Impression: No diagnosis found. _______________________________ Attestations: 
Scribe Attestation Aashish Solis acting as a scribe for and in the presence of Debbie Bateman MD     
January 26, 2019 at 12:38 PM 
    
Provider Attestation: I personally performed the services described in the documentation, reviewed the documentation, as recorded by the scribe in my presence, and it accurately and completely records my words and actions. January 26, 2019 at 12:38 PM - Salud Valderrama MD   
_______________________________

## 2019-01-26 NOTE — ED TRIAGE NOTES
Patient arrived via EMS c/o altered mental status. Per medics, tympanic temp was 103.3 and other vitals were stable. Patient was seen here on Tuesday for the same thing and was treated for possible UTI. Patient does have a history of alzheimer and dementia. Family is at bedside.

## 2019-01-27 NOTE — ROUTINE PROCESS
2000 16 FR coude feliciano inserted per MD orders for accurate measurement of urinary output. Ariel system used with assistance off going nurse, Yudy Lucio RN. Procedure explained to patient. Feliciano care, incontinent, and alphonso care performed. Sterile technique performed. Patient tolerated well. Corin colored urine with small specks of blood noted in catheter. Penile sores at tip of penis and meatus. Catheter secured to thigh, insertion date and time placed on feliciano drainage bag, and drainage tube clipped to bed. 24 hour urine collection started at this time. Sign posted on patient's bathroom door. Assumed care of patient from off going nurse. Patient resting in bed. No distress noted. Call bell within reach, siderails up x 3, bed in lowest position, and patient instructed to use call bell for assistance. Will continue to monitor. 1244 Patient continues to pull of telemetry monitor, climb OOB, and tugging at feliciano catheter. MD notified. Received order for bilateral soft wrist restraints and side rails upX 4. Patient's son Katy Linder) notified. 3082 Bedside and Verbal shift change report given to Christina Hammond RN (oncoming nurse) by Roc Barton RN (offgoing nurse). Report included the following information Kardex, Intake/Output, MAR, Recent Results and Cardiac Rhythm SR tele box# [id number].

## 2019-01-27 NOTE — PROGRESS NOTES
[facility] Hospitalist Group Progress Note Patient: Ashley Pate Age: 81 y.o. : 1935 MR#: [id number] SSN: [id number] Date/Time: 2019 Subjective: pt AAOx1, feels ok, denies any complaints D/w son luis armando over phone, not ambulating for 1-2 weeks, declining, poor oral intake. No N/V/D at home Assessment/Plan: 1. 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 Fever with no source of infection, flu negative? UTI 3. ANN due to # 1 4. Dehydration, poor oral intake 5. FTT 6. Urinary retention 7. Dementia 8. HTN, BP high  
  
Plan: Will conitnue IVF and lasix as needed   
monitor BMP and Ionized calcium Follow up serum and Urine PEP 
D/w Dr. Adina Aleman, renal, recommend cont IVF and hematology eval  
D/w Oncology Dr. Edin Villanueva, will see in am   
S/p Zoledronic acid yesterday Blood cultures neg so far Will get SLP eval  
Cont restrains as needed due to risk for pulling feliciano Cont empiric: Vanc and Zosyn due to his Immunosuppression and recent admission Increase Amlodipine and hydralazine PRN Palliative care consult D/w pt 
D/w son Jenna Fonseca [phone] in detail over phone, per son pt not seen oncology but was suppose to see Dr. Mary Silveira. Explained him above diagnosis and poor prognosis, he understands and agree with plan. Also d/w son about adv directives, he wants DNR, he will sign DDNR when he come here. Martin Samples was witnessed by [employer] over phone. DNR 
D/w RN I spent 40 minutes with the patient in face-to-face consultation, of which greater than 50% was spent in counseling and coordination of care as described above. Case discussed with:  [x]Patient  [x]Family  [x]Nursing  []Case Management DVT Prophylaxis:  []Lovenox  []Hep SQ  []SCDs  []Coumadin   []On Heparin gtt Objective:  
VS:  
Visit Vitals /75 (BP 1 Location: Right arm, BP Patient Position: At rest) Pulse 80 Temp 96.3 °F (35.7 °C) Resp 22 Wt 59.5 kg (131 lb 1.6 oz) SpO2 99% BMI 18.81 kg/m² Tmax/24hrs: Temp (24hrs), Av.5 °F (36.4 °C), Min:96.3 °F (35.7 °C), Max:98.1 °F (36.7 °C) IOBRIEF Intake/Output Summary (Last 24 hours) at 2019 1231 Last data filed at 2019 1209 Gross per 24 hour Intake 3861.25 ml Output 3650 ml Net 211.25 ml General:  Alert, cooperative, no acute distress   
HEENT: PERRLA, anicteric sclerae. Pulmonary:  CTA Bilaterally. No Wheezing/Rhonchi/Rales. Cardiovascular: Regular rate and Rhythm. GI:  Soft, Non distended, Non tender. + Bowel sounds. Extremities:  No edema, cyanosis, clubbing. No calf tenderness. Neurologic: Alert and oriented X 1. Moves all ext, follows commands Additional: diffuse muscle wasting and dry scaly skin Medications:  
Current Facility-Administered Medications Medication Dose Route Frequency  influenza vaccine 2018- (6 mos+)(PF) (FLUARIX QUAD/FLULAVAL QUAD) injection 0.5 mL  0.5 mL IntraMUSCular ONCE  
 vancomycin (VANCOCIN) 1000 mg in  ml infusion  1,000 mg IntraVENous ONCE  
 [START ON 2019] Vancomycin random level  1 Each Other Rx Dosing/Monitoring  VANCOMYCIN INFORMATION NOTE   Other CONTINUOUS  
 mineral oil-hydrophil petrolat (AQUAPHOR) ointment   Topical PRN  
 sodium chloride (NS) flush 5-10 mL  5-10 mL IntraVENous PRN  piperacillin-tazobactam (ZOSYN) 2.25 g in 0.9% sodium chloride (MBP/ADV) 50 mL MBP  2.25 g IntraVENous Q6H  
 amLODIPine (NORVASC) tablet 5 mg  5 mg Oral DAILY  aspirin tablet 325 mg  325 mg Oral DAILY  heparin (porcine) injection 5,000 Units  5,000 Units SubCUTAneous Q8H  
 0.9% sodium chloride infusion  125 mL/hr IntraVENous CONTINUOUS Labs:   
Recent Results (from the past 24 hour(s)) URINALYSIS W/ RFLX MICROSCOPIC Collection Time: 19  2:19 PM  
Result Value Ref Range Color DARK YELLOW Appearance TURBID Specific gravity 1.023 1.005 - 1.030    
 pH (UA) 5.0 5.0 - 8.0 Protein NEGATIVE  NEG mg/dL Glucose NEGATIVE  NEG mg/dL Ketone NEGATIVE  NEG mg/dL Bilirubin NEGATIVE  NEG Blood TRACE (A) NEG Urobilinogen 1.0 0.2 - 1.0 EU/dL Nitrites NEGATIVE  NEG Leukocyte Esterase TRACE (A) NEG URINE MICROSCOPIC ONLY Collection Time: 01/26/19  2:19 PM  
Result Value Ref Range WBC 4 to 10 0 - 4 /hpf  
 RBC 0 to 3 0 - 5 /hpf Epithelial cells FEW 0 - 5 /lpf Bacteria 1+ (A) NEG /hpf Mucus 1+ (A) NEG /lpf Hyaline cast 0 to 3 0 - 2 /lpf Other: 2+ STAR SHAPE URATE CRYSTALS   
EKG, 12 LEAD, INITIAL Collection Time: 01/26/19  2:53 PM  
Result Value Ref Range Ventricular Rate 65 BPM  
 Atrial Rate 65 BPM  
 P-R Interval 146 ms  
 QRS Duration 92 ms Q-T Interval 392 ms QTC Calculation (Bezet) 407 ms Calculated P Axis 4 degrees Calculated R Axis 58 degrees Calculated T Axis 45 degrees Diagnosis Normal sinus rhythm Normal ECG When compared with ECG of 16-OCT-2018 11:22, No significant change was found Confirmed by Den Hussein ([id number]) on 1/26/2019 4:42:54 PM 
  
INFLUENZA A & B AG (RAPID TEST) Collection Time: 01/26/19  4:37 PM  
Result Value Ref Range Influenza A Antigen NEGATIVE  NEG Influenza B Antigen NEGATIVE  NEG    
LACTIC ACID Collection Time: 01/26/19  8:45 PM  
Result Value Ref Range Lactic acid 2.1 (HH) 0.4 - 2.0 MMOL/L  
CULTURE, BLOOD Collection Time: 01/26/19  8:45 PM  
Result Value Ref Range Special Requests: NO SPECIAL REQUESTS Culture result: NO GROWTH AFTER 7 HOURS METABOLIC PANEL, BASIC Collection Time: 01/26/19  8:45 PM  
Result Value Ref Range Sodium 145 136 - 145 mmol/L Potassium 3.9 3.5 - 5.5 mmol/L Chloride 110 (H) 100 - 108 mmol/L  
 CO2 28 21 - 32 mmol/L Anion gap 7 3.0 - 18 mmol/L Glucose 100 (H) 74 - 99 mg/dL BUN 32 (H) 7.0 - 18 MG/DL Creatinine 2.47 (H) 0.6 - 1.3 MG/DL  
 BUN/Creatinine ratio 13 12 - 20 GFR est AA 31 (L) >60 ml/min/1.73m2 GFR est non-AA 25 (L) >60 ml/min/1.73m2 Calcium 13.2 (HH) 8.5 - 10.1 MG/DL  
GLUCOSE, POC Collection Time: 01/26/19  9:06 PM  
Result Value Ref Range Glucose (POC) 96 70 - 110 mg/dL Prosper Holden Collection Time: 01/27/19  3:02 AM  
Result Value Ref Range Vancomycin, random 18.7 5.0 - 66.9 UG/ML  
METABOLIC PANEL, BASIC Collection Time: 01/27/19  3:02 AM  
Result Value Ref Range Sodium 142 136 - 145 mmol/L Potassium 3.6 3.5 - 5.5 mmol/L Chloride 106 100 - 108 mmol/L  
 CO2 29 21 - 32 mmol/L Anion gap 7 3.0 - 18 mmol/L Glucose 95 74 - 99 mg/dL BUN 32 (H) 7.0 - 18 MG/DL Creatinine 2.30 (H) 0.6 - 1.3 MG/DL  
 BUN/Creatinine ratio 14 12 - 20 GFR est AA 33 (L) >60 ml/min/1.73m2 GFR est non-AA 27 (L) >60 ml/min/1.73m2 Calcium 13.8 (HH) 8.5 - 10.1 MG/DL  
CALCIUM, IONIZED Collection Time: 01/27/19  3:02 AM  
Result Value Ref Range Ionized Calcium 1.72 (HH) 1.12 - 1.32 MMOL/L  
CKMB PROFILE Collection Time: 01/27/19  3:02 AM  
Result Value Ref Range CK 71 39 - 308 U/L  
 CK - MB <1.0 <3.6 ng/ml CK-MB Index  0.0 - 4.0 % CALCULATION NOT PERFORMED WHEN RESULT IS BELOW LINEAR LIMIT  
CBC WITH AUTOMATED DIFF Collection Time: 01/27/19  3:02 AM  
Result Value Ref Range WBC 5.8 4.6 - 13.2 K/uL  
 RBC 3.83 (L) 4.70 - 5.50 M/uL  
 HGB 11.0 (L) 13.0 - 16.0 g/dL HCT 34.0 (L) 36.0 - 48.0 % MCV 88.8 74.0 - 97.0 FL  
 MCH 28.7 24.0 - 34.0 PG  
 MCHC 32.4 31.0 - 37.0 g/dL  
 RDW 12.9 11.6 - 14.5 % PLATELET 005 (L) 830 - 420 K/uL MPV 11.7 9.2 - 11.8 FL  
 NEUTROPHILS 78 (H) 40 - 73 % LYMPHOCYTES 16 (L) 21 - 52 % MONOCYTES 6 3 - 10 % EOSINOPHILS 0 0 - 5 % BASOPHILS 0 0 - 2 %  
 ABS. NEUTROPHILS 4.6 1.8 - 8.0 K/UL  
 ABS. LYMPHOCYTES 0.9 0.9 - 3.6 K/UL  
 ABS. MONOCYTES 0.4 0.05 - 1.2 K/UL ABS. EOSINOPHILS 0.0 0.0 - 0.4 K/UL  
 ABS. BASOPHILS 0.0 0.0 - 0.1 K/UL  
 DF AUTOMATED PROTHROMBIN TIME + INR Collection Time: 01/27/19  3:02 AM  
Result Value Ref Range Prothrombin time 16.2 (H) 11.5 - 15.2 sec INR 1.3 (H) 0.8 - 1.2 LACTIC ACID Collection Time: 01/27/19  3:02 AM  
Result Value Ref Range Lactic acid 1.9 0.4 - 2.0 MMOL/L  
GLUCOSE, POC Collection Time: 01/27/19  7:58 AM  
Result Value Ref Range Glucose (POC) 90 70 - 110 mg/dL GLUCOSE, POC Collection Time: 01/27/19 11:54 AM  
Result Value Ref Range Glucose (POC) 110 70 - 110 mg/dL Signed By: Vidhi Rowan MD   
 January 27, 2019

## 2019-01-27 NOTE — PROGRESS NOTES
Pnt arrived to unit via stretcher Son @ bedside Resting quietly w/o complaint RR even and unlabored Skin noted to be extremely dry and flakey Tip of penis and scrotum noted to be excoriated Bed alarm on d/t pnt trying to get out of bed Advised pnt that he needed to stay in bed to prevent falls. Will continue to monitor for safety

## 2019-01-27 NOTE — PROGRESS NOTES
Bedside and Verbal shift change report received from [facility] going nurse). Report included the following information SBAR, Kardex, MAR and Recent Results. Assumed care patient in bed awake w/ bilateral soft wrist restraints, no redness nor injury noted,will check & monitor every 2 hrs. Fall prevention program maintained,call bell and bedside table within reach. No problems identified at this time,No complaints made ,will continue care.

## 2019-01-27 NOTE — CONSULTS
Consult Note Consult requested by: Dr. Ebony Fonseca Caterina is a 81 y.o. male [address] who is being seen on consult for ANN and Hypercalcemia Chief Complaint Patient presents with  Altered mental status Admission diagnosis: <principal problem not specified> HPI: 82 yo AA male admitted for increasing generalized weakness. Patient unable to provide  Any info. Info taken from the hospital records. Pt sees Dr. 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 Notes indicate he was being referred to hematology but unclear if actually was seen. At that time serum crea was 1.05 and calcium was normal. On this admission he came in with elevated Andrea and crea. On admission he was started on IV NS and IV zometa. Currently denies any CP and SOB, no pain, no N/V. He apparently was also on Bactrim for UTI prior to admission. Past Medical History:  
Diagnosis Date  CKD (chronic kidney disease) stage 2, GFR 60-89 ml/min 3/29/2010  Dementia  High blood cholesterol level 3/29/2010  
 HTN (hypertension), benign 3/29/2010  Hyperglycemia 3/29/2010  Microalbuminuria 12/14/2010  Vertigo 3/29/2010 No past surgical history on file. Social History Socioeconomic History  Marital status:  Spouse name: Not on file  Number of children: Not on file  Years of education: Not on file  Highest education level: Not on file Social Needs  Financial resource strain: Not on file  Food insecurity - worry: Not on file  Food insecurity - inability: Not on file  Transportation needs - medical: Not on file  Transportation needs - non-medical: Not on file Occupational History  Not on file Tobacco Use  Smoking status: Never Smoker  Smokeless tobacco: Never Used Substance and Sexual Activity  Alcohol use: No  
 Drug use: No  
 Sexual activity: Not on file Other Topics Concern  Not on file Social History Narrative  Not on file No family history on file. No Known Allergies Home Medications:  
 
Prior to Admission Medications Prescriptions Last Dose Informant Patient Reported? Taking? amLODIPine (NORVASC) 5 mg tablet 1/26/2019  No Yes Sig: Take 1 Tab by mouth daily. aspirin (ASPIRIN) 325 mg tablet 1/26/2019  Yes Yes Sig: Take 325 mg by mouth daily. meclizine (ANTIVERT) 25 mg tablet 1/26/2019  No Yes Sig: Take 1 Tab by mouth three (3) times daily as needed for up to 3 doses. Patient taking differently: Take 1 Tab by mouth three (3) times daily as needed for Dizziness. naproxen sodium (ALEVE) 220 mg tablet 1/26/2019  Yes Yes Sig: Take 220 mg by mouth daily as needed for Pain. trimethoprim-sulfamethoxazole (BACTRIM DS, SEPTRA DS) 160-800 mg per tablet   No No  
Sig: Take 1 Tab by mouth two (2) times a day for 3 days. Facility-Administered Medications: None Current Facility-Administered Medications Medication Dose Route Frequency  influenza vaccine 2018-19 (6 mos+)(PF) (FLUARIX QUAD/FLULAVAL QUAD) injection 0.5 mL  0.5 mL IntraMUSCular ONCE  
 vancomycin (VANCOCIN) 1000 mg in  ml infusion  1,000 mg IntraVENous ONCE  
 [START ON 1/28/2019] Vancomycin random level  1 Each Other Rx Dosing/Monitoring  VANCOMYCIN INFORMATION NOTE   Other CONTINUOUS  
 sodium chloride (NS) flush 5-10 mL  5-10 mL IntraVENous PRN  piperacillin-tazobactam (ZOSYN) 2.25 g in 0.9% sodium chloride (MBP/ADV) 50 mL MBP  2.25 g IntraVENous Q6H  
 amLODIPine (NORVASC) tablet 5 mg  5 mg Oral DAILY  aspirin tablet 325 mg  325 mg Oral DAILY  heparin (porcine) injection 5,000 Units  5,000 Units SubCUTAneous Q8H  
 0.9% sodium chloride infusion  125 mL/hr IntraVENous CONTINUOUS Review of Systems:  
Review of systems not obtained due to patient factors. Data Review: 
 
Labs: Results:  
   
Chemistry Recent Labs  
  01/27/19 0302 01/26/19 2045 01/26/19 
1145 GLU 95 100* 103*  145 142  
K 3.6 3.9 3.9  110* 107 CO2 29 28 28 BUN 32* 32* 33* CREA 2.30* 2.47* 2.72* CA 13.8* 13.2* 14.3* AGAP 7 7 7 BUCR 14 13 12 AP  --   --  80  
TP  --   --  11.1* ALB  --   --  3.0*  
GLOB  --   --  8.1* AGRAT  --   --  0.4* CBC w/Diff Recent Labs  
  01/27/19 
0302 01/26/19 
1145 WBC 5.8 4.4*  
RBC 3.83* 3.78* HGB 11.0* 11.0*  
HCT 34.0* 33.1*  
* 139 GRANS 78* 73 LYMPH 16* 20* EOS 0 0 Coagulation Recent Labs  
  01/27/19 
0302 PTP 16.2* INR 1.3* Iron/Ferritin No results for input(s): IRON in the last 72 hours. No lab exists for component: TIBCCALC BNP No results for input(s): BNPP in the last 72 hours. Cardiac Enzymes Recent Labs  
  01/27/19 
0302 CPK 71 CKND1 CALCULATION NOT PERFORMED WHEN RESULT IS BELOW LINEAR LIMIT Liver Enzymes Recent Labs  
  01/26/19 
1145 TP 11.1* ALB 3.0* AP 80 SGOT 18 Thyroid Studies No results found for: T4, T3U, TSH, TSHEXT    
U/A noted IMAGES: CXR no acute process EKG NSR 
CULTURE: Blood c/s negative so far Physical Assessment:  
 
Visit Vitals /85 (BP 1 Location: Right arm, BP Patient Position: At rest) Pulse 84 Temp 97.4 °F (36.3 °C) Resp 18 Wt 59.5 kg (131 lb 1.6 oz) SpO2 96% BMI 18.81 kg/m² Last 3 Recorded Weights in this Encounter 01/26/19 1149 01/27/19 0516 Weight: 59.3 kg (130 lb 11.7 oz) 59.5 kg (131 lb 1.6 oz) Intake/Output Summary (Last 24 hours) at 1/27/2019 1123 Last data filed at 1/27/2019 6949 Gross per 24 hour Intake 3611.25 ml Output 3650 ml Net -38.75 ml Physial Exam: 
General appearance: appears weak and emaciated, not in any resp distress, aware he is in the hospital. restrained Skin: no rash. HEENT: non icteric, dry lips and mucosa Neck: No JVD Lungs: clear to auscultation bilaterally Heart: regular rate and rhythm, no rub Abdomen: soft, non-tender. Bowel sounds normal. No masses,  no organomegaly Extremities: No LE edema IMPRESSION AND PLAN:  
ANN with hypercalcemia could be prerenal from dec po intake but given Hx of MM and hypercalcemia Myeloma cast nephropathy is strongly considered. Discussed with Dr Cyril Bush, needs Hematology input to consider starting plasmapheresis +/- anti myeloma treatment. Cont with IVF hydration with close monitoring for signs of fluid overload. Avoid nephrotoxic drugs and adjust all meds to renal function. Renal function improving, good urine output, and calcium slightly better. Can consider Calcitonin if calcium is refractory to above measures. Cont to trend. ? Renal Bx HTN cont same Fever workup in progress, trend Vanco trough closely Anemia most likely from MM defer to ARIEL ABBOTT & DELFINA MONACO [facility] Discussed with dr Cyril Bush Thank you will follow with you. David Franco MD 
January 27, 2019

## 2019-01-28 PROBLEM — R53.81 DEBILITY: Status: ACTIVE | Noted: 2019-01-01

## 2019-01-28 PROBLEM — G30.1 LATE ONSET ALZHEIMER'S DISEASE WITHOUT BEHAVIORAL DISTURBANCE (HCC): Status: ACTIVE | Noted: 2019-01-01

## 2019-01-28 PROBLEM — F02.80 LATE ONSET ALZHEIMER'S DISEASE WITHOUT BEHAVIORAL DISTURBANCE (HCC): Status: ACTIVE | Noted: 2019-01-01

## 2019-01-28 NOTE — PROGRESS NOTES
Received report on pt.from off going RN. Resting quietly in bed on rounds. Denies c/o pain or SOB at this time. Remains confused and resistant to care. Bilateral wrist restraints on due to pt pulling out ivs and trying to pull out feliciano. No acute distress noted. Bed alarms on. Call bell at side. Will cont to monitor for any changes in status. 0830 pt resistant to vitals donte lord taken and fought and refused accucheck.

## 2019-01-28 NOTE — PROGRESS NOTES
NUTRITION Nursing Referral: [facility] Nutrition Consult: General Nutrition Management & Supplements RECOMMENDATIONS / PLAN:  
 
- Add supplements: Magic Cup BID & Ensure Pudding BID. - Monitor and encourage po/supplement intake. - Continue RD inpatient monitoring and evaluation. NUTRITION INTERVENTIONS & DIAGNOSIS:  
 
[x] Meals/snacks: modify composition 
[x] Medical food supplement therapy: initiate [x] IV fluids: 1/2 NS at 100 mL/hr 
 
Nutrition Diagnosis: Unintended weight loss related to inadequate energy intake as evidenced by 25 lb, 16.2% weight loss x 6 months PTA per chart review. Patient meets criteria for Severe Protein Calorie Malnutrition as evidenced by:  
ASPEN Malnutrition Criteria Acute Illness, Chronic Illness, or Social/Enviornmental: Chronic illness Weight Loss: Greater than 10% x 6 mos Body Fat: Severe(upper arm & thoracic regions) Muscle Mass: Severe(clavicle, dorsal hand, patellar, quadricep regions) ASPEN Malnutrition Score - Chronic Illness: 18 Chronic Illness - Malnutrition Diagnosis: Severe malnutrition. ASSESSMENT:  
 
Pt with AMS/dementia, unable to provide answers to questions. Per H&P family reporting poor meal intake x 1 or more weeks PTA. Unplanned weight loss per chart. NFPE completed. ANN upon admission with hypercalcemia, receiving IV fluids. Ate 50% of breakfast per RN, consistency modified per SLP. Average po intake adequate to meet patients estimated nutritional needs:   [] Yes     [x] No   [] Unable to determine at this time Diet: DIET DYSPHAGIA MECH ALTERED (NDD2) 1 NECTAR; 2 GM NA (House Low NA) Food Allergies: NKFA Current Appetite:   [] Good     [x] Fair per RN     [] Poor     [] Other: 
Appetite/meal intake prior to admission:   [] Good     [] Fair     [x] Poor x 1 or more weeks per H&P (per son)     [] Other:  
Feeding Limitations:  [x] Swallowing difficulty: SLP following    [] Chewing difficulty    [] Other: Current Meal Intake: No data found. BM: PTA Skin Integrity: WDL Edema:   [x] No     [] Yes Pertinent Medications: Reviewed Recent Labs  
  01/28/19 
0411 01/27/19 
0302 01/26/19 
2045 01/26/19 
1145  142 145 142  
K 3.5 3.6 3.9 3.9  106 110* 107 CO2 26 29 28 28 GLU 83 95 100* 103* BUN 27* 32* 32* 33* CREA 1.79* 2.30* 2.47* 2.72* CA 11.6* 13.8* 13.2* 14.3*  
MG 1.7 1.9  --   --   
PHOS 1.8* 3.2  --   --   
ALB 2.7*  --   --  3.0*  
SGOT 24  --   --  18 ALT 11*  --   --  11* Intake/Output Summary (Last 24 hours) at 1/28/2019 1233 Last data filed at 1/28/2019 7722 Gross per 24 hour Intake 580 ml Output 1400 ml Net -820 ml Anthropometrics: 
Ht Readings from Last 1 Encounters:  
01/22/19 5' 10\" (1.778 m) Last 3 Recorded Weights in this Encounter 01/26/19 1149 01/27/19 0516 01/28/19 0449 Weight: 59.3 kg (130 lb 11.7 oz) 59.5 kg (131 lb 1.6 oz) 58.6 kg (129 lb 3 oz) Body mass index is 18.54 kg/m². Borderline underweight Weight History: Pt unable to provide weight hx PTA. Per chart review pt with a 25 lb, 16.2% weight loss x 6 months PTA. Weight Metrics 1/28/2019 1/22/2019 11/19/2018 10/30/2018 7/19/2018 4/16/2018 1/15/2018 Weight 129 lb 3 oz 150 lb 150 lb 144 lb 154 lb 159 lb 161 lb BMI 18.54 kg/m2 21.52 kg/m2 21.52 kg/m2 20.66 kg/m2 22.1 kg/m2 22.81 kg/m2 23.1 kg/m2 Admitting Diagnosis: ANN (acute kidney injury) ([facility] [address] Hypercalcemia Fever Pertinent PMHx: CKD, dementia, HTN, DM Education Needs:        [x] None identified  [] Identified - Not appropriate at this time  []  Identified and addressed - refer to education log Learning Limitations:   [] None identified  [x] Identified: dementia Cultural, Zoroastrianism & ethnic food preferences:  [x] None identified    [] Identified and addressed ESTIMATED NUTRITION NEEDS:  
 
Calories: 8897-4948 kcal (MSJx1.2-1.3) based on  [] Actual BW     [x] IBW: 76 kg  
 Protein: 61-76 gm (0.8-1 gm/kg) based on  [] Actual BW      [x] IBW Fluid: 1 mL/kcal 
  
MONITORING & EVALUATION:  
 
Nutrition Goal(s): 1. Po intake of meals will meet >75% of patient estimated nutritional needs within the next 7 days. Outcome:  [] Met/Ongoing    []  Not Met    [x] New/Initial Goal  
 
Monitoring:   [x] Food and beverage intake   [x] Diet order   [x] Nutrition-focused physical findings   [x] Treatment/therapy   [] Weight   [] Enteral nutrition intake Previous Recommendations (for follow-up assessments only):     []   Implemented       []   Not Implemented (RD to address)      [] No Longer Appropriate     [] No Recommendation Made Discharge Planning: regular diet; consistency per SLP [x] Participated in care planning, discharge planning, & interdisciplinary rounds as appropriate Paul Wilkes, RD Pager: [phone]

## 2019-01-28 NOTE — CONSULTS
[facility] Name:RADHA SINGH MR#: [id number] : 1935 ACCOUNT #: [de-identified] DATE OF SERVICE: 2019 REFERRING PHYSICIAN:  Mayra Dasilva MD 
 
REASON FOR EVALUATION:  Hypercalcemia, abnormal serum protein electrophoresis suspect myeloma. HISTORY OF PRESENT ILLNESS:  The patient is a frail and 81year-old gentleman. He has been brought in through the emergency room due to progressive loss of appetite, weakness and weight loss. He lives with his son at home. He has been bedbound for many weeks. In the emergency room, he was noted to have hypercalcemia. We have been asked to evaluate him. His calcium level uncorrected was 14.3 with an albumin of 3 and total protein was 11. He was also in acute renal failure with a creatinine of 2.7. We have been asked to evaluate him. He remains bedbound. He has no insight into his problems. He has been refusing care and has been restrained. There was an abnormal serum protein electrophoresis 2018 that demonstrated a monoclonal spike. The patient is not verbalizing any discomfort, chest pain or shortness of breath. PAST MEDICAL HISTORY:  Chronic renal failure, dementia, hyperlipidemia, hypertension. FAMILY HISTORY:  Noncontributory. SOCIAL HISTORY:  Lives at home with his son. REVIEW OF SYSTEMS:  Cannot be obtained. PHYSICAL EXAMINATION: 
GENERAL:  Frail elderly gentleman lying in bed. VITAL SIGNS:  Afebrile, heart rate 84, blood pressure 141/90. HEENT:  Pupils equal, sclerae are anicteric. Pallor is noted. Bitemporal wasting, gaunt looking elderly gentleman with generalized loss of muscle mass. LUNGS:  Bilateral poor inspiratory breath sounds. HEART:  First and second heart sounds audible. ABDOMEN:  Scaphoid. NEUROLOGIC:  Alert, moans to his name, cannot complete a full exam. 
 
LABORATORY DATA:  Hemoglobin 11.5, white count 5.4, platelets 213,595. BUN 27, creatinine 1.7 on admission, , creatinine 2.7, calcium 14.3. This morning 11.6, total protein 11, albumin 3. Iron saturation 26%, serum iron 49. ASSESSMENT: 
1. Hypercalcemia. 2.  Abnormal protein albumin ratio, abnormal SPEP. 3.  Anemia. 4.  Acute renal failure. 5.  Malnutrition. 6.  Dementia. RECOMMENDATIONS:  I have reviewed the above data. Findings are suspicious for multiple myeloma. The patient has been given IV fluids. I would continue normal saline 150 per hour. Monitor input and output, monitor renal function, serum protein electrophoresis, beta 2 microglobulin, quantitative immunoglobulins, free light chain essay and 24-hour urine protein electrophoresis and immunofixation electrophoresis orders. I would discuss goals of care before ordering further testing, which would include a bone marrow aspiration biopsy. Given his social situation and his dementia, option of comfort measures with supportive care only a reasonable option for this gentleman. Palliative care to evaluate and assist in coordination of care. Further recommendations to follow. Thank you for Dr. Mark Hooker, for requesting my evaluation in this patient's care. MD TORI Coppola / EJ 
D: 01/28/2019 08:38 T: 01/28/2019 08:55 JOB #: [id number]

## 2019-01-28 NOTE — PROGRESS NOTES
Palliative Medicine Telephone call made to pt's son Ronan Lisa ([phone]). Per Chris Pinon, the pt is still , however the pt's wife is homebound and has dementia. The pt also has two sons, one of whom lives in [place] and is not active in pt's care. The pt has currently been living with Chris Pinon. Chris Pinon is agreeable to come in for a family meeting to discuss goals of care on TUES 1/29/19 at 1400. *Plan for a family meeting tomorrow [facility] 1/29 at 1400. Thank you for the Palliative Medicine consult and allowing us to participate in the care of Mr. Catrina Oliveira. Will continue to monitor and provide support. JONES Cazares Palliative Medicine Inpatient RN [facility] Palliative [place] Line: [phone] ([phone])

## 2019-01-28 NOTE — PROGRESS NOTES
[facility] Hospitalist Group Progress Note Patient: Rosa Villela Age: 81 y.o. : 1935 MR#: [id number] SSN: [id number] Date/Time: 2019 Subjective:   
 
Patient is feeling better. Denies for chest and abdominal pain. No cough or SOB. States his son, Osorio Hunt, usually make his decisions. He states he lives with his mother. No nausea or vomiting. Appetites is somewhat better today per nursing. Objective:  
 
/83 (BP 1 Location: Right arm, BP Patient Position: At rest)   Pulse 89   Temp 97 °F (36.1 °C)   Resp 22   Wt 58.6 kg (129 lb 3 oz)   SpO2 100%   BMI 18.54 kg/m² General:  Alert, cooperative, no acute distress   
Pulmonary:  CTA Bilaterally. No Wheezes Cardiovascular: Regular rate and Rhythm. GI:  Soft, Non distended, Non tender. + Bowel sounds. Extremities:  No pedal edema. Neurologic: Alert and oriented X 2-3. Moves all ext well - motor strength 4/5 in both LEs Additional: diffuse muscle wasting and dry scaly skin Assessment/Plan: 1. Hypercalcemia likely due to possible multiple myeloma, Immunofixation shows IgA monoclonal protein with kappa light chain Specificity in 2018 2. Fever with no source of infection. resolved 3. ANN due to # 1, improving 4. Dehydration, poor oral intake 5. FTT 6. Urinary retention 7. Dementia 8. HTN 9. Severe protein calorie malnutrition Plan: 
 
Cont current management Nephrology and hematology to follow Family meeting tomorrow per palliative care team 
D/W son at bedside Case discussed with:  [x]Patient  [x]Family  [x]Nursing  []Case Management DVT Prophylaxis:  []Lovenox  []Hep SQ  []SCDs  []Coumadin   []On Heparin gtt Labs:   
Recent Results (from the past 24 hour(s)) GLUCOSE, POC Collection Time: 19  5:00 PM  
Result Value Ref Range Glucose (POC) 107 70 - 110 mg/dL GLUCOSE, POC Collection Time: 19  8:55 PM  
Result Value Ref Range Glucose (POC) 101 70 - 110 mg/dL CBC WITH AUTOMATED DIFF Collection Time: 01/28/19  4:11 AM  
Result Value Ref Range WBC 5.4 4.6 - 13.2 K/uL  
 RBC 3.92 (L) 4.70 - 5.50 M/uL  
 HGB 11.5 (L) 13.0 - 16.0 g/dL HCT 34.5 (L) 36.0 - 48.0 % MCV 88.0 74.0 - 97.0 FL  
 MCH 29.3 24.0 - 34.0 PG  
 MCHC 33.3 31.0 - 37.0 g/dL  
 RDW 12.7 11.6 - 14.5 % PLATELET 982 (L) 442 - 420 K/uL MPV 10.8 9.2 - 11.8 FL  
 NEUTROPHILS 80 (H) 40 - 73 % LYMPHOCYTES 11 (L) 21 - 52 % MONOCYTES 8 3 - 10 % EOSINOPHILS 1 0 - 5 % BASOPHILS 0 0 - 2 %  
 ABS. NEUTROPHILS 4.4 1.8 - 8.0 K/UL  
 ABS. LYMPHOCYTES 0.6 (L) 0.9 - 3.6 K/UL  
 ABS. MONOCYTES 0.4 0.05 - 1.2 K/UL  
 ABS. EOSINOPHILS 0.0 0.0 - 0.4 K/UL  
 ABS. BASOPHILS 0.0 0.0 - 0.1 K/UL  
 DF AUTOMATED    
CALCIUM, IONIZED Collection Time: 01/28/19  4:11 AM  
Result Value Ref Range Ionized Calcium 1.45 (H) 1.12 - 6.01 MMOL/L  
METABOLIC PANEL, COMPREHENSIVE Collection Time: 01/28/19  4:11 AM  
Result Value Ref Range Sodium 141 136 - 145 mmol/L Potassium 3.5 3.5 - 5.5 mmol/L Chloride 106 100 - 108 mmol/L  
 CO2 26 21 - 32 mmol/L Anion gap 9 3.0 - 18 mmol/L Glucose 83 74 - 99 mg/dL BUN 27 (H) 7.0 - 18 MG/DL Creatinine 1.79 (H) 0.6 - 1.3 MG/DL  
 BUN/Creatinine ratio 15 12 - 20 GFR est AA 44 (L) >60 ml/min/1.73m2 GFR est non-AA 37 (L) >60 ml/min/1.73m2 Calcium 11.6 (H) 8.5 - 10.1 MG/DL Bilirubin, total 0.4 0.2 - 1.0 MG/DL  
 ALT (SGPT) 11 (L) 16 - 61 U/L  
 AST (SGOT) 24 15 - 37 U/L Alk. phosphatase 73 45 - 117 U/L Protein, total 10.4 (H) 6.4 - 8.2 g/dL Albumin 2.7 (L) 3.4 - 5.0 g/dL Globulin 7.7 (H) 2.0 - 4.0 g/dL A-G Ratio 0.4 (L) 0.8 - 1.7 MAGNESIUM Collection Time: 01/28/19  4:11 AM  
Result Value Ref Range Magnesium 1.7 1.6 - 2.6 mg/dL PHOSPHORUS Collection Time: 01/28/19  4:11 AM  
Result Value Ref Range Phosphorus 1.8 (L) 2.5 - 4.9 MG/DL Rosslyn Bull Collection Time: 01/28/19  9:23 AM  
Result Value Ref Range Vancomycin, random 19.7 5.0 - 40.0 UG/ML Signed By: Gurvinder Ochoa MD   
 January 28, 2019

## 2019-01-28 NOTE — ROUTINE PROCESS
1928 Assumed care of patient from off going nurse. Patient resting in bed. No distress noted. Call bell within reach, siderails up x 3, bed in lowest position, and bed alarm activated. Will to continue to monitor. 0745 Bedside and Verbal shift change report given to [address] [place] [address] (oncoming nurse) by Chante Hinson RN(offgoing nurse).  Report included the following information Kardex, Intake/Output, MAR, Recent Results and Cardiac Rhythm SR.

## 2019-01-28 NOTE — PROGRESS NOTES
Palliative med team consisting of this author, Yen Lisa NP, and Anu Villasenor RN, met with Pt at bedside. He is in soft restraints and had uneaten breakfast in front of him. Offered to assist him with eating, but he shook his head \"no\" indicating that he wasn't hungry. He was not oriented to place or date and told us that he has two sons and a wife and that he lives with his wife, though records and his board indicate that he lives with son, Susy Fry. Pt denied having any pain or other concerns at this time. This team will contact Susy Fry to discuss advance care planning and f/u PRN. Thank you for the consult and the opportunity to assist in Mr. Juarez's care. Ara Bolanos [facility] Palliative Medicine

## 2019-01-28 NOTE — CONSULTS
[facility] [phone] ([phone]) [facility]: [phone] [facility]: [phone] Patient Name: Arpita Kenney YOB: 1935 Date of Initial Consult: 1/28/2019 Reason for Consult: care decisions Requesting Provider: Dr. Leonela Dixon Primary Care Physician: Jerardo Gaitan MD 
  
 SUMMARY:  
Arpita Kenney is a 81y.o. year old with a past history of dementia, hypertension, chronic kidney disease, who was admitted on 1/26/2019 from home  with a diagnosis of weakness, poor appetite . In the ER he was found to have a calcium of 14.2 Current medical issues leading to Palliative Medicine involvement include: 81year old male with a history of dementia, bed bound state for over a month, now with suspicious of possible multiple myeloma. Palliative medicine is consulted for care decisions. iSmone Jesus PALLIATIVE DIAGNOSES:  
1. Advanced care plan discussions 2. hypercalcemia 3. Dementia 4. Debility PLAN:  
1. Advanced care plan discussions patient does not have an AMD on file. He is currently not able to participate in his medical decision making. Call to his son Savannah Stephens, lives with him, He is  but according to Savannah Stephens she has been diagnosed with dementia and not able to participate in decisions. There is a brother in [place] who according to Savannah Stephens is not involved in his care. Plan to meet Savannah Stephens at bedside tomorrow at 2:00 pm to discuss care decisions. Goals of care noted discussion with attending, DNR. DDNR to be signed by son ( he is aware). 2. Hypercalcemia  Oncology on board, suspicious for multiple myeloma, continue IVF's. Plan to discuss with son. Discussed with oncology 3. Dementia bed bound for over a month, incontinent per EMS notes, oriented to himself only. Speech consult noted, nectar thick liquids 4. Debility bed bound for over a month / currently unsure of baseline will discuss with 5. Initial consult note routed to primary continuity provider 6. Communicated plan of care with: Palliative IDT, son, oncology GOALS OF CARE: 
  
 
 
TREATMENT PREFERENCES:  
Code Status: DNR Advance Care Planning: 
[x] The [facility] Interdisciplinary Team has updated the ACP Navigator with [address] and Patient Capacity Primary Decision Maker (Health Care Agent): Pierre Ford Relationship to patient: son Phone number: [phone] [] Named in a scanned document  
[x] Legal Next of Kin 
[] Guardian Secondary Decision Maker (First Alternate Health Care Agent):  
Relationship to patient: 
Phone number: 
[] Named in a scanned document  
[] Legal Next of Kin 
[] Guardian Other: As far as possible, the palliative care team has discussed with patient / health care proxy about goals of care / treatment preferences for patient. HISTORY:  
 
History obtained from: chart CHIEF COMPLAINT: weakness HPI/SUBJECTIVE: The patient is:  
[] Verbal and participatory [x] Non-participatory due to: dementia Please see summary Clinical Pain Assessment (nonverbal scale for nonverbal patients): Clinical Pain Assessment Severity: 0 Activity (Movement): Lying quietly, normal position Duration: for how long has pt been experiencing pain (e.g., 2 days, 1 month, years) Frequency: how often pain is an issue (e.g., several times per day, once every few days, constant) FUNCTIONAL ASSESSMENT:  
 
Palliative Performance Scale (PPS): PPS: 30 
 
ECOG 
ECOG Status : Completely disabled PSYCHOSOCIAL/SPIRITUAL SCREENING:  
  
Any spiritual / Alevism concerns: unable to assess for patient  
[] Yes /  [] No 
 
Caregiver Burnout: 
[] Yes /  [] No /  [x] No Caregiver Present Anticipatory grief assessment:  Unable to assess for patient  
[] Normal  / [] Maladaptive REVIEW OF SYSTEMS:  
 
Positive and pertinent negative findings in ROS are noted above in HPI. The following systems were [] reviewed / [x] unable to be reviewed as noted in HPI Other findings are noted below. Systems: constitutional, ears/nose/mouth/throat, respiratory, gastrointestinal, genitourinary, musculoskeletal, integumentary, neurologic, psychiatric, endocrine. Positive findings noted below. Modified ESAS Completed by: provider Pain: 0 Dyspnea: 0 PHYSICAL EXAM:  
 
Wt Readings from Last 3 Encounters:  
01/28/19 58.6 kg (129 lb 3 oz) 01/22/19 68 kg (150 lb)  
11/19/18 68 kg (150 lb) Blood pressure 136/83, pulse 89, temperature 97 °F (36.1 °C), resp. rate 22, weight 58.6 kg (129 lb 3 oz), SpO2 100 %. Pain: 
Pain Scale 1: Visual 
Pain Intensity 1: 0 Last bowel movement: none recorded Constitutional: frail, chronically ill appearing male reclining in bed alert in NAD Eyes: pupils equal, anicteric Respiratory: breathing not labored Skin: warm, dry Neurologic: alert oriented to himself, was not able to tell us where he is, date HISTORY:  
 
Active Problems: Hypercalcemia (1/26/2019) Fever (1/26/2019) ANN (acute kidney injury) ([facility] [address] (1/26/2019) Past Medical History:  
Diagnosis Date  CKD (chronic kidney disease) stage 2, GFR 60-89 ml/min 3/29/2010  Dementia  High blood cholesterol level 3/29/2010  
 HTN (hypertension), benign 3/29/2010  Hyperglycemia 3/29/2010  Microalbuminuria 12/14/2010  Vertigo 3/29/2010 No past surgical history on file. No family history on file. History reviewed, no pertinent family history. Social History Tobacco Use  Smoking status: Never Smoker  Smokeless tobacco: Never Used Substance Use Topics  Alcohol use: No  
 
No Known Allergies Current Facility-Administered Medications Medication Dose Route Frequency  0.9% sodium chloride infusion  150 mL/hr IntraVENous CONTINUOUS  
 vancomycin (VANCOCIN) 1000 mg in  ml infusion  1,000 mg IntraVENous ONCE  
 [START ON 1/30/2019] Vancomycin random level  1 Each Other Rx Dosing/Monitoring  Vancomycin random level  1 Each Other Rx Dosing/Monitoring  VANCOMYCIN INFORMATION NOTE   Other CONTINUOUS  
 amLODIPine (NORVASC) tablet 10 mg  10 mg Oral DAILY  hydrALAZINE (APRESOLINE) 20 mg/mL injection 10 mg  10 mg IntraVENous Q6H PRN  mineral oil-hydrophil petrolat (AQUAPHOR) ointment   Topical BID  sodium chloride (NS) flush 5-10 mL  5-10 mL IntraVENous PRN  piperacillin-tazobactam (ZOSYN) 2.25 g in 0.9% sodium chloride (MBP/ADV) 50 mL MBP  2.25 g IntraVENous Q6H  
 aspirin tablet 325 mg  325 mg Oral DAILY  heparin (porcine) injection 5,000 Units  5,000 Units SubCUTAneous Q8H  
 
 
 LAB AND IMAGING FINDINGS:  
 
Lab Results Component Value Date/Time WBC 5.4 01/28/2019 04:11 AM  
 HGB 11.5 (L) 01/28/2019 04:11 AM  
 PLATELET 296 (L) 58/82/1414 04:11 AM  
 
Lab Results Component Value Date/Time Sodium 141 01/28/2019 04:11 AM  
 Potassium 3.5 01/28/2019 04:11 AM  
 Chloride 106 01/28/2019 04:11 AM  
 CO2 26 01/28/2019 04:11 AM  
 BUN 27 (H) 01/28/2019 04:11 AM  
 Creatinine 1.79 (H) 01/28/2019 04:11 AM  
 Calcium 11.6 (H) 01/28/2019 04:11 AM  
 Magnesium 1.7 01/28/2019 04:11 AM  
 Phosphorus 1.8 (L) 01/28/2019 04:11 AM  
  
Lab Results Component Value Date/Time AST (SGOT) 24 01/28/2019 04:11 AM  
 Alk. phosphatase 73 01/28/2019 04:11 AM  
 Protein, total 10.4 (H) 01/28/2019 04:11 AM  
 Albumin 2.7 (L) 01/28/2019 04:11 AM  
 Globulin 7.7 (H) 01/28/2019 04:11 AM  
 
Lab Results Component Value Date/Time INR 1.3 (H) 01/27/2019 03:02 AM  
 Prothrombin time 16.2 (H) 01/27/2019 03:02 AM  
  
Lab Results Component Value Date/Time Iron 49 (L) 11/19/2018 04:00 PM  
 TIBC 189 (L) 11/19/2018 04:00 PM  
 Iron % saturation 26 11/19/2018 04:00 PM  
  
No results found for: PH, PCO2, PO2 No components found for: [place] Lab Results Component Value Date/Time CK 71 01/27/2019 03:02 AM  
 CK - MB <1.0 01/27/2019 03:02 AM  
  
 
   
 
Total time: 30 minutes Counseling / coordination time, spent as noted above: 20 minutes 
> 50% counseling / coordination:  yes Prolonged service was provided for  []30 min   []75 min in face to face time in the presence of the patient, spent as noted above. Time Start:  
Time End:  
Note: this can only be billed with 61439 (initial) or 91810 (follow up). If multiple start / stop times, list each separately.

## 2019-01-28 NOTE — PROGRESS NOTES
Problem: Dysphagia (Adult) Goal: *Acute Goals and Plan of Care (Insert Text) Patient will: 1. Tolerate PO trials with 0 s/s overt distress in 4/5 trials 2. Utilize compensatory swallow strategies/maneuvers (decrease bite/sip, size/rate, alt. liq/sol) with min cues in 4/5 trials 3. Perform oral-motor/laryngeal exercises to increase oropharyngeal swallow function with min cues 4. Complete an objective swallow study (i.e., MBSS) to assess swallow integrity, r/o aspiration, and determine of safest LRD, min A as indicated/ordered by MD  
 
Recommend:  
[facility] soft, nectar thick liquids Meds in puree or with nectar thick liquids Aspiration precautions HOB >45 degrees during all intake and for at least 30 min after po Small bites/sips, slow rate of intake, alternating bites/sips Oral care post meals Speech LAnguage Pathology bedside swallow evaluation Patient: Jessica Ball (29 y.o. male) Date: 1/28/2019 Primary Diagnosis: ANN (acute kidney injury) ([facility] [address] Hypercalcemia Fever Precautions: aspiration, restraints ASSESSMENT : 
Based on the objective data described below, the patient presents with mild oral dysphagia and moderate pharyngeal dyshagia. Oral motor structures observed to be within function limits. Voice hoarse and breathy. Pt confused and became agitated during evaluation. Pt observed to cough post PO trials of thin liquids. Pt tolerated trials of nectar thick liquids without any overt s/sx of aspiration. Pt observed to have a delayed initiation of swallow and decreased laryngeal elevation upon observation. During solid food trials, residue noted on lips and buccal cavity. Recommend mechanical soft diet with nectar thick liquids with use of the above mentioned compensatory strategies/aspiration precautions. Discussed with pt who was unable to verbalize understanding. Discussed with RN. Will continue to follow for dysphagia management. Patient will benefit from skilled intervention to address the above impairments. Patients rehabilitation potential is considered to be Guarded Factors which may influence rehabilitation potential include:  
[]            None noted [x]            Mental ability/status [x]            Medical condition []            Home/family situation and support systems [x]            Safety awareness 
[]            Pain tolerance/management []            Other: PLAN : 
Recommendations and Planned Interventions: As above. Frequency/Duration: Patient will be followed by speech-language pathology 1-2 times per day/4-7 days per week to address goals. Discharge Recommendations: To Be Determined SUBJECTIVE:  
Patient stated Get away from  [facility]. OBJECTIVE:  
 
Past Medical History:  
Diagnosis Date  CKD (chronic kidney disease) stage 2, GFR 60-89 ml/min 3/29/2010  Dementia  High blood cholesterol level 3/29/2010  
 HTN (hypertension), benign 3/29/2010  Hyperglycemia 3/29/2010  Microalbuminuria 12/14/2010  Vertigo 3/29/2010 No past surgical history on file. Prior Level of Function/Home Situation:  
Home Situation Home Environment: Private residence # Steps to Enter: 2 One/Two Story Residence: One story Living Alone: No 
Support Systems: Child(italo) Patient Expects to be Discharged to[de-identified] Skilled nursing facility Current DME Used/Available at Home: Cane, straight Diet prior to admission: unknown Current Diet:  Regular diet with thin liquidsCognitive and Communication Status: 
Neurologic State: Appropriate for age, Eyes open spontaneously Orientation Level: Disoriented to person Oral Assessment: 
Oral Assessment Labial: No impairment Oral Hygiene: adequate Lingual: No impairment Velum: No impairment Mandible: No impairment P.O. Trials: 
Patient Position: [facility] at [facility] Vocal quality prior to P.O.: Hoarse;Breathy;Low volume Consistency Presented: Nectar thick liquid; Solid; Thin liquid How Presented: Self-fed/presented;Cup/sip;Straw Bolus Acceptance: No impairment Bolus Formation/Control: No impairment Propulsion: Delayed (# of seconds) Oral Residue: Anterior;Lingual 
Initiation of Swallow: Delayed (# of seconds) Laryngeal Elevation: Decreased Aspiration Signs/Symptoms: Change vocal quality;Weak cough Pharyngeal Phase Characteristics: Altered vocal quality;Poor endurance Effective Modifications: None Cues for Modifications: Maximal 
Oral Phase Severity: Mild Pharyngeal Phase Severity : Moderate The severity rating is based on the following outcomes:   
Clinical Judgment Pain: 
Pt unable to verbalize pain level due to cognitive status. After treatment:  
[]            Patient left in no apparent distress sitting up in chair 
[x]            Patient left in no apparent distress in bed 
[x]            Call bell left within reach [x]            Nursing notified 
[]            Caregiver present 
[]            Bed alarm activated COMMUNICATION/EDUCATION:  
[x]            SLP educated pt with regard to compensatory swallow strategies and 
     aspiration/reflux precautions including: small bites/sips, 
     alternate liquids/solids, decrease feeding rate, HOB > 45 with all po, and      upright in bed at 30 degrees after po for at least 45 minutes. []            Patient/family have participated as able in goal setting and plan of care. []            Patient/family agree to work toward stated goals and plan of care. []            Patient understands intent and goals of therapy; neutral about participation. [x]            Patient is unable to participate in goal setting and plan of care. Thank you for this referral. 
Cari Valdez M.S., CCC-SLP Speech-Language Pathologist

## 2019-01-28 NOTE — CONSULTS
Consult Note Consult requested by: Dr. Manjula Santiago Blair Aragon is a 81 y.o. male [address] who is being seen on consult for ANN, and hypercalcemia Chief Complaint Patient presents with  Altered mental status Admission diagnosis: altered mental status 81y M with PMH HTN, admitted for weakness, seen for hyeprcalcemia, ANN Impression & Plan:  
IMPRESSION:  
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  
 Fortunately good urine output and improving renal function with hydration, I would suggest hydrate with half NS as his sodium and chloride on higher side. NO plan for any kidney biopsy given his age and will not change any from treatment point of view. Awaiting for bonemarrow biopsy. Adjust meds per current renal function status. Will follow very closely. Discussed with Dr. Leland Curiel. Thank you very much for allowing me to participate in the care of this patient. We will continue to monitor with you and make recommendations as needed. 
  
 
 
HPI:  81y M with PMH HTN, h/o no severe renal disease in past, admitted on 26th for generalized weakness and poor appetite. His lab shows severe ANN, hypercalcemia. He received abx, iv hydration and  Zometa. Nephrology service consulted for further assistance. During my eval, he is not able to provide any history because of dementia. He denies for any discomfort. His previous lab shows stable renal function in past.  
His med list shows he was on bactrim and nsaids, not sure he was taking it or not. Past Medical History:  
Diagnosis Date  CKD (chronic kidney disease) stage 2, GFR 60-89 ml/min 3/29/2010  Dementia  High blood cholesterol level 3/29/2010  
 HTN (hypertension), benign 3/29/2010  Hyperglycemia 3/29/2010  Microalbuminuria 12/14/2010  Vertigo 3/29/2010 No past surgical history on file. Social History Socioeconomic History  Marital status:  Spouse name: Not on file  Number of children: Not on file  Years of education: Not on file  Highest education level: Not on file Social Needs  Financial resource strain: Not on file  Food insecurity - worry: Not on file  Food insecurity - inability: Not on file  Transportation needs - medical: Not on file  Transportation needs - non-medical: Not on file Occupational History  Not on file Tobacco Use  Smoking status: Never Smoker  Smokeless tobacco: Never Used Substance and Sexual Activity  Alcohol use: No  
 Drug use: No  
 Sexual activity: Not on file Other Topics Concern  Not on file Social History Narrative  Not on file No family history on file. No Known Allergies Home Medications:  
 
Prior to Admission Medications Prescriptions Last Dose Informant Patient Reported? Taking? amLODIPine (NORVASC) 5 mg tablet 1/26/2019  No Yes Sig: Take 1 Tab by mouth daily. aspirin (ASPIRIN) 325 mg tablet 1/26/2019  Yes Yes Sig: Take 325 mg by mouth daily. meclizine (ANTIVERT) 25 mg tablet 1/26/2019  No Yes Sig: Take 1 Tab by mouth three (3) times daily as needed for up to 3 doses. Patient taking differently: Take 1 Tab by mouth three (3) times daily as needed for Dizziness. naproxen sodium (ALEVE) 220 mg tablet 1/26/2019  Yes Yes Sig: Take 220 mg by mouth daily as needed for Pain. trimethoprim-sulfamethoxazole (BACTRIM DS, SEPTRA DS) 160-800 mg per tablet   No No  
Sig: Take 1 Tab by mouth two (2) times a day for 3 days. Facility-Administered Medications: None Current Facility-Administered Medications Medication Dose Route Frequency  0.9% sodium chloride infusion  150 mL/hr IntraVENous CONTINUOUS  
 vancomycin (VANCOCIN) 1000 mg in  ml infusion  1,000 mg IntraVENous ONCE  
 [START ON 1/30/2019] Vancomycin random level  1 Each Other Rx Dosing/Monitoring  Vancomycin random level  1 Each Other Rx Dosing/Monitoring  VANCOMYCIN INFORMATION NOTE   Other CONTINUOUS  
 amLODIPine (NORVASC) tablet 10 mg  10 mg Oral DAILY  hydrALAZINE (APRESOLINE) 20 mg/mL injection 10 mg  10 mg IntraVENous Q6H PRN  mineral oil-hydrophil petrolat (AQUAPHOR) ointment   Topical BID  sodium chloride (NS) flush 5-10 mL  5-10 mL IntraVENous PRN  piperacillin-tazobactam (ZOSYN) 2.25 g in 0.9% sodium chloride (MBP/ADV) 50 mL MBP  2.25 g IntraVENous Q6H  
 aspirin tablet 325 mg  325 mg Oral DAILY  heparin (porcine) injection 5,000 Units  5,000 Units SubCUTAneous Q8H Review of Systems:  
 
Not able to provide in detail, dementia Data Review: 
 
Labs: Results:  
   
Chemistry Recent Labs  
  01/28/19 0411 01/27/19 0302 01/26/19 
2045 01/26/19 
1145 GLU 83 95 100* 103*  142 145 142  
K 3.5 3.6 3.9 3.9  106 110* 107 CO2 26 29 28 28 BUN 27* 32* 32* 33* CREA 1.79* 2.30* 2.47* 2.72* CA 11.6* 13.8* 13.2* 14.3* AGAP 9 7 7 7 BUCR 15 14 13 12 AP 73  --   --  80  
TP 10.4*  --   --  11.1* ALB 2.7*  --   --  3.0*  
GLOB 7.7*  --   --  8.1* AGRAT 0.4*  --   --  0.4* CBC w/Diff Recent Labs  
  01/28/19 0411 01/27/19 0302 01/26/19 
1145 WBC 5.4 5.8 4.4*  
RBC 3.92* 3.83* 3.78* HGB 11.5* 11.0* 11.0*  
HCT 34.5* 34.0* 33.1*  
* 132* 139 GRANS 80* 78* 73 LYMPH 11* 16* 20* EOS 1 0 0 Coagulation Recent Labs  
  01/27/19 0302 PTP 16.2* INR 1.3* Iron/Ferritin No results for input(s): IRON in the last 72 hours. No lab exists for component: TIBCCALC BNP No results for input(s): BNPP in the last 72 hours. Cardiac Enzymes Recent Labs  
  01/27/19 
0302 CPK 71 CKND1 CALCULATION NOT PERFORMED WHEN RESULT IS BELOW LINEAR LIMIT Liver Enzymes Recent Labs  
  01/28/19 
0411 TP 10.4* ALB 2.7* AP 73 SGOT 24 Thyroid Studies No results found for: T4, T3U, TSH, TSHEXT    
 EKG: sinus Physical Assessment:  
 
Visit Vitals /83 (BP 1 Location: Right arm, BP Patient Position: At rest) Pulse 89 Temp 97 °F (36.1 °C) Resp 22 Wt 58.6 kg (129 lb 3 oz) SpO2 100% BMI 18.54 kg/m² Weight change: -0.7 kg (-8.7 oz) Intake/Output Summary (Last 24 hours) at 1/28/2019 1211 Last data filed at 1/28/2019 5289 Gross per 24 hour Intake 580 ml Output 1400 ml Net -820 ml Physical Exam:  
General: comfortable, no acute distress HEENT sclera anicteric, RS: + air entry b/l, Abd: Soft, Non tender, Neuro:  Dementia Extrm: no  edema, no cyanosis, clubbing Skin: no visible  Rash Musculoskeletal: No gross joints or bone deformities Procedures/imaging: see electronic medical records for all procedures, Xrays and details which were not copied into this note but were reviewed prior to creation of Cuate Feng MD 
January 28, 2019 [facility] [phone]

## 2019-01-28 NOTE — CONSULTS
Hypercalcemia, acute renal failure, altered mentation,  abr protein/alb ration abnormal SPEP,  Anemia, dementia, wasting Suspect multiple myeloma Suggest /hr,  zometa given, I/O, labs ordered Discuss goals of care with son before bone marrow biopsy ordered Option of supportive care only, if pursued , is a reasonable option

## 2019-01-28 NOTE — CDMP QUERY
Please consider the following regarding patient's nutritional status. The medical record reflects the following: 
 
-----> Risk: 81 yr old male with dementia, failure to thrive,  BMI 18.54, multiple comorbidities 
 
-----> Clinical Indicators: * 1-28-19 Nutrition PN:  \"Nutrition Diagnosis: Unintended weight loss related to inadequate energy intake as evidenced by 25 lb, 16.2% weight loss x 6 months PTA per chart review. 
  
Patient meets criteria for Severe Protein Calorie Malnutrition as evidenced by:  
ASPEN Malnutrition Criteria Acute Illness, Chronic Illness, or Social/Enviornmental: Chronic illness Weight Loss: Greater than 10% x 6 mos Body Fat: Severe(upper arm & thoracic regions) Muscle Mass: Severe(clavicle, dorsal hand, patellar, quadricep regions) ASPEN Malnutrition Score - Chronic Illness: 18 Chronic Illness - Malnutrition Diagnosis: Severe malnutrition. [employer] \" 
 
-----> Treatment:  Nutrition rec:  - Add supplements: Magic Cup BID & Ensure Pudding BID. - Monitor and encourage po/supplement intake. - Continue RD inpatient monitoring and evaluation. Please clarify if this patient is being treated/managed for: 
 
=> severe protein calorie malnutrition 
=> Other Explanation of clinical findings 
=> Unable to Determine (no explanation of clinical findings) Please clarify and document your clinical opinion in the progress notes and discharge summary including the definitive and/or presumptive diagnosis, (suspected or probable), related to the above clinical findings. Please include clinical findings supporting your diagnosis. If you DECLINE this query or would like to communicate with [facility], please utilize the \"[employer] message box\" at the TOP of the Progress Note on the right. Thank you, Sumi Hampton, RN 
[phone]

## 2019-01-28 NOTE — HOME CARE
Rounded on this \"[facility]\" patient , pt does not talk much and only answers a few  questions,slow response , explained to patient about [facility] services and left a brochure at beside for pt's family. MATT ALVARADO.

## 2019-01-28 NOTE — PROGRESS NOTES
Reason for Admission:   ANN (acute kidney injury) ([facility] [address] Hypercalcemia Fever RRAT Score:    31 
          
Resources/supports as identified by patient/family:   Patient has family supports and a personal caregiver that comes to the home. Top Challenges facing patient (as identified by patient/family and CM): Family feels that patient needs hospice care. Finances/Medication cost?     NO Transportation     Son Paul Calles) will transport. Support system or lack thereof? Family supports and personal care giver. Living arrangements? Lives with his wife. Self-care/ADLs/Cognition? Alert but confused to time and place. Current Advanced Directive/Advance Care Plan:   yes Plan for utilizing home health: There are no home health orders in place at this time. This writer will continue to closely monitor for potential home health needs and other discharge recommendations. Likelihood of readmission:   HIGH Transition of Care Plan:    Patient's family would like patient to be admitted to hospice care. Son feels that he needs extra supports at home, than what his mother and him can manage. Son will transport at the time of discharge, if patient can safely be discharged in a car. Initial assessment completed with patient's son Paul Calles). Cognitive status of patient: Alert but impaired cognition (confused to time and place). Face sheet information confirmed:  yes. The patient designates his son Paul Calles) and his wife Guerda Webb) to participate in his/her discharge plan and to receive any needed information. This patient lives in a house with his wife. Patient is able to navigate steps as needed. Prior to hospitalization, patient was considered to be independent with ADLs/IADLS : no . If not independent,  patient needs assist with : ADLs and IADLs. Patient has a current ACP document on file: yes Patient's son Alma Moya) will be available to transport patient home upon discharge. The patient already has a cane available in the home. Patient is not currently active with home health. Patient has not stayed in a skilled nursing facility or rehab. Currently, the discharge plan is to return home with help from his family and family is hoping to have hospice services in place. When appropriate, medical team needs to put in a referral to palliative care for patient and deem if he is hospice appropriate. Patient's wife is Cecille Gutiérrez, MT#[phone]). Patient's son is José Luis Luna EA#[phone]). Patient's PCP is Dr. Mayela Trujillo. Patient is insured through Medicare [employer] and he also has [email or web address]. The patient's son states that patient can obtain his medications from the pharmacy, and take his medications as directed, with help from his family and caregiver. This writer will continue to closely monitor for discharge planning to ensure a safe discharge home from [place]. Care Management Interventions PCP Verified by CM: Yes(Dr. Mayela Trujillo) Palliative Care Criteria Met (RRAT>21 & CHF Dx)?: No 
Mode of Transport at Discharge: Other (see comment)(Son Alma Moya) will transport. ) Transition of Care Consult (CM Consult): Discharge Planning Current Support Network: Lives with Spouse, Family Lives [place], Has Personal Caregivers Confirm Follow Up Transport: Family Plan discussed with Pt/Family/Caregiver: Yes Discharge Location Discharge Placement: Home with family assistance Jaylyn Forbes. MS Care Manager Pager#: [phone]

## 2019-01-28 NOTE — PROGRESS NOTES
conducted an initial consultation and Spiritual Assessment for Sam Duncan, who is a 81 y. o.,male. Patients Primary Language is: [place]. According to the patients EMR Sikhism Affiliation is: Methodist. The reason the Patient came to the hospital is:  
Patient Active Problem List  
 Diagnosis Date Noted  Hypercalcemia 01/26/2019  Fever 01/26/2019  [facility] 01/26/2019  UTI (urinary tract infection) 10/16/2018  Controlled type 2 diabetes mellitus with microalbuminuria, without long-term current use of insulin ([facility] [address] 05/15/2017  ACP (advance care planning) 04/27/2016  Microalbuminuria 12/14/2010  
 HTN (hypertension), benign 03/29/2010  CKD (chronic kidney disease) stage 2, GFR 60-89 ml/min 03/29/2010  Vertigo 03/29/2010  High blood cholesterol level 03/29/2010 The  provided the following Interventions: 
Initiated a relationship of care and support. Provided information about Spiritual Care Services. Chart reviewed. Assessment: 
Patient not very talkative. Plan: 
Chaplains will continue to follow and will provide pastoral care on an as needed/requested basis.  recommends bedside caregivers page  on duty if patient shows signs of acute spiritual or emotional distress. Maurisio Bejarano Spiritual Care 
([phone])

## 2019-01-29 NOTE — PROGRESS NOTES
Palliative med team consisting of this Rigo Mauricio, Yokasta Javier, LIZETH, and Doug Gutiérrez, RN, met with Pt's wife, Gabino Morrow, and son, Dayna Calloway, in the lounge on Pt's floor. Updated them on pt's medical conditions of multiple myeloma and dementia. They are aware that there aren't any treatment options for the myeloma, given Pt's advanced age, debility and dementia. They had a lot of questions about Pt's decline in functioning over the past month. \"He was walking around, taking out the garbage, checking mail and all that before. \"  They did acknowledge that he had been sleeping \"most of the time lately, though,and eating only a few bites/day. \" The family is opting to make Pt comfort care, and they are agreeable to [facility] consult with plan to take him home with hospice and 24/7 caregiver. They signed a POST form indicating DNR/DNI, comfort measures and no artiificial feedings tubes. They currently have a lady who comes in 4 hrs/day to help [address] and Pt, so she may be available for longer while Dayna Calloway is at work from 7-3 pm.  The other options are for Dayna Calloway to register Pt with [facility] in [place] (they have a [phone]) for consideration of hospice bed there. This Sierra Hang will find out if Dayna Calloway can register Pt at the local [place] outpatient clinic in [place] or [facility] going to [place] and will get back to him tomorrow. Dayna Calloway was also encouraged to initiate [employer] applications for both of his parents for additional care options. Provided the family with contact info for this team.  They were provided with 3 copies of the POST Form, and a copy was placed in his chart to be scanned into EMR. Thank you for the consult and the opportunity to assist in Mr. Juarez's care. Malik Olivier, [facility] Palliative Medicine

## 2019-01-29 NOTE — ROUTINE PROCESS
Received report from [facility]. Pt's awake, confused, NAD, breathing non labored, on room air, HOB up. IV site clean, dry and intact. IVF going per order. Bilateral wrists restraints on. Recinos cath in place. Pt's on 24hour urine collection , to end at 1000 tomorrow morning. Bed at the lowest level on lock position, call bell w/i reach. Bedside and Verbal shift change report given to JOHANNA Aldridge (oncoming nurse) by me (offgoing nurse).  Report included the following information SBAR, Kardex, Intake/Output, MAR, Recent Results and Cardiac Rhythm SR.

## 2019-01-29 NOTE — PROGRESS NOTES
Bedside shift change report given to Darrin Enriquez (oncoming nurse) by Jayant Llanos (offgoing nurse). Report included the following information SBAR, Kardex and MAR. Pt resting in bed. Restraints in place. NAD at this time. 24 hour urine collection in progress. . Fed pt breakfast he was able to consume 50% plus supplement. 1300 removed restraints from one hand as d/w dr Eduarda Castillo. Pt continues to pull on catheter. Told me to \"get out\" while attempting assessment. 1400 replaced wrist restraint . Will not attempt to give heparin sub q at this time due to risk to myself and pt. Bed in lowest position and call bell in reach. Pt becomes combative while attempting to perform routine nursing tasks. Notified by palliative team that pt is to be comfort care only. Bedside shift change report given to Jayant Llanos (oncoming nurse) by Darrin Enriquez (offgoing nurse). Report included the following information SBAR, Kardex and MAR.

## 2019-01-29 NOTE — PROGRESS NOTES
Problem: Impaired Skin Integrity/Pressure Injury Treatment Goal: *Prevention of pressure injury Document Chidi Scale and appropriate interventions in the flowsheet. Outcome: Progressing Towards Goal 
Pressure Injury Interventions: 
Sensory Interventions: Assess changes in LOC, Assess need for specialty bed, Avoid rigorous massage over bony prominences, Check visual cues for pain, Discuss PT/OT consult with provider, Keep linens dry and wrinkle-free, Maintain/enhance activity level, Minimize linen layers, Monitor skin under medical devices, Pad between skin to skin, Pressure redistribution bed/mattress (bed type), Turn and reposition approx. every two hours (pillows and wedges if needed), Use 30-degree side-lying position Moisture Interventions: Absorbent underpads, Apply protective barrier, creams and emollients, Assess need for specialty bed, Check for incontinence Q2 hours and as needed, Internal/External urinary devices, Limit adult briefs, Maintain skin hydration (lotion/cream), Minimize layers, Moisture barrier, Offer toileting Q_hr Activity Interventions: Assess need for specialty bed, Pressure redistribution bed/mattress(bed type), PT/OT evaluation Mobility Interventions: Assess need for specialty bed, Chair cushion, HOB 30 degrees or less, Pressure redistribution bed/mattress (bed type), PT/OT evaluation, Turn and reposition approx. every two hours(pillow and wedges) Nutrition Interventions: Document food/fluid/supplement intake, Discuss nutritional consult with provider, Offer support with meals,snacks and hydration Friction and Shear Interventions: Apply protective barrier, creams and emollients, HOB 30 degrees or less, Foam dressings/transparent film/skin sealants, Lift sheet, Minimize layers Problem: Falls - Risk of 
Goal: *Absence of Falls Document Aldeanixon Higden Fall Risk and appropriate interventions in the flowsheet.  
Outcome: Progressing Towards Goal 
 Fall Risk Interventions: 
Mobility Interventions: Assess mobility with egress test, Bed/chair exit alarm, Communicate number of staff needed for ambulation/transfer, OT consult for ADLs, Patient to call before getting OOB, PT Consult for mobility concerns, PT Consult for assist device competence, Strengthening exercises (ROM-active/passive) Mentation Interventions: Adequate sleep, hydration, pain control, Bed/chair exit alarm, Door open when patient unattended, Evaluate medications/consider consulting pharmacy, Eyeglasses and hearing aids, Familiar objects from home, Family/sitter at bedside, Increase mobility, More frequent rounding, Reorient patient, Room close to nurse's station, Toileting rounds, Update white board Medication Interventions: Assess postural VS orthostatic hypotension, Bed/chair exit alarm, Evaluate medications/consider consulting pharmacy Elimination Interventions: Bed/chair exit alarm, Call light in reach, Patient to call for help with toileting needs, Toilet paper/wipes in reach, Toileting schedule/hourly rounds Problem: Pressure Injury - Risk of 
Goal: *Prevention of pressure injury Document Chidi Scale and appropriate interventions in the flowsheet. Outcome: Progressing Towards Goal 
Pressure Injury Interventions: 
Sensory Interventions: Assess changes in LOC, Assess need for specialty bed, Avoid rigorous massage over bony prominences, Check visual cues for pain, Discuss PT/OT consult with provider, Keep linens dry and wrinkle-free, Maintain/enhance activity level, Minimize linen layers, Monitor skin under medical devices, Pad between skin to skin, Pressure redistribution bed/mattress (bed type), Turn and reposition approx. every two hours (pillows and wedges if needed), Use 30-degree side-lying position Moisture Interventions: Absorbent underpads, Apply protective barrier, creams and emollients, Assess need for specialty bed, Check for incontinence Q2 hours and as needed, Internal/External urinary devices, Limit adult briefs, Maintain skin hydration (lotion/cream), Minimize layers, Moisture barrier, Offer toileting Q_hr Activity Interventions: Assess need for specialty bed, Pressure redistribution bed/mattress(bed type), PT/OT evaluation Mobility Interventions: Assess need for specialty bed, Chair cushion, HOB 30 degrees or less, Pressure redistribution bed/mattress (bed type), PT/OT evaluation, Turn and reposition approx. every two hours(pillow and wedges) Nutrition Interventions: Document food/fluid/supplement intake, Discuss nutritional consult with provider, Offer support with meals,snacks and hydration Friction and Shear Interventions: Apply protective barrier, creams and emollients, HOB 30 degrees or less, Foam dressings/transparent film/skin sealants, Lift sheet, Minimize layers

## 2019-01-29 NOTE — WOUND CARE
Physical Exam  
Room 558:  
Physician order for fingernail & toenail debridement received from Dr. Beto Johnston. Introduced myself & services. Pt is alone at this time. Verbal consent obtained via phone with son, Tonie Rene. Explained procedure: assessment, hygiene, education, & nail service and all questions answered. Informed of possible complications: nip/cut skin, infection, & nail splits or crumbles, and all questions answered. Son verbalized understanding of procedure and associated risks and agrees to have services provided at this time. [facility]Toe[facility]ails Care Services 1. History: Chief Complaint Patient presents with  Altered mental status  
, Active Ambulatory Problems Diagnosis Date Noted  HTN (hypertension), benign 03/29/2010  CKD (chronic kidney disease) stage 2, GFR 60-89 ml/min 03/29/2010  Vertigo 03/29/2010  High blood cholesterol level 03/29/2010  Microalbuminuria 12/14/2010  ACP (advance care planning) 04/27/2016  Controlled type 2 diabetes mellitus with microalbuminuria, without long-term current use of insulin ([facility] [address] 05/15/2017  UTI (urinary tract infection) 10/16/2018 Resolved Ambulatory Problems Diagnosis Date Noted  No Resolved Ambulatory Problems Past Medical History:  
Diagnosis Date  CKD (chronic kidney disease) stage 2, GFR 60-89 ml/min 3/29/2010  Dementia  High blood cholesterol level 3/29/2010  
 HTN (hypertension), benign 3/29/2010  Hyperglycemia 3/29/2010  Microalbuminuria 12/14/2010  Vertigo 3/29/2010 2. Assessment Skin Dryness (+  ) Maceration (-  ) Discoloration (+  ) scales on feet Varicosities (-  ) Edema (-  ) Temperature (-  ) Ulcers (-  ) Fissures (-  ) Corns (-  ) Callus (+  ) Thinning Fat Pads (-  ) Other (-  ) Nails Debris (+  ) Thickened (+  ) Discolored (+  ) Deformed (+  ) Incurvated (+  ) Ingrown (-  ) Brittle (+ ) Crumbly (+  ) Split (-  ) Loose (-  ) Fungus (+  ) toenails Other (-  ) Toes Deformed (-  ) Claw toes (+  ) Hammer toes (-  ) Mallet toes (-  ) Overlapping toes (-  ) Other (-  ) Feet Deformed (-  ) Bunions (-  ) Hallux Valgus (-  ) Flat feet (-  ) Other (-  ) 3. Interventions Hygiene Provided (+  ) Remove loose debris (+  ) Fingernails manually debrided (+ ) Fingernails manually filed (+ ) Toenails manually debrided (+  ) Toenails manually filed (+  ) Buff calluses (+  ) Applied emollient (+  ) Patient education (+  ) pt not able to repeat back   Caden Preston RN, BSN, [address], [facility]  1/29/2019  10:30 AM

## 2019-01-29 NOTE — ACP (ADVANCE CARE PLANNING)
Palliative Medicine No Advance Directive on file in EMR. Pt is unable to complete one d/t mental status (dementia). Primary Decision Maker (Health Care Agent): Alan Locke Relationship to patient:  Wife Phone number: [phone] [] Named in a scanned document  
[x] Legal Next of Kin 
[] Guardian Secondary Decision Maker (First Alternate Health Care Agent):  Eros Chamberlain Relationship to patient: Adult son Phone number: [phone] [] Named in a scanned document  
[x] Legal Next of Kin 
[] Guardian ACP documents you current have include: 
[] Advance Directive or Living Will 
[] Durable Do Not Resuscitate [x] Physician Orders for Scope of Treatment (POST) [] Medical Power of  
[] Other Pt remains DNR status. POST form signed indicating DDNR status, comfort measures and NO feeding tube was signed by Vandana Garnica Medicine NP and by pt's legal NOK Alan Locke (wife) and Eros Chamberlain (adult son). One copy was placed on the chart for scanning into EMR. 3 additional copies were given to the pt's wife. Start comfort measures effective today TUES 1/29/19. BSRN updated. Potential dispo plan is home with hospice. Thank you for the Palliative Medicine consult and allowing us to participate in the care of Mr. Hortensia Walter. Will continue to monitor and provide support. Dante Levy, PAVITHRAN Palliative Medicine Inpatient [facility] Palliative COPE Line: [phone] ([phone])

## 2019-01-29 NOTE — PROGRESS NOTES
[facility] Hospitalist Group Progress Note Patient: Marian Diggs Age: 81 y.o. : 1935 MR#: [id number] SSN: [id number] Date/Time: 2019 Subjective:   
 
Patient is sitting up in bed. No chest or abdominal pain. Having UE restrains. No nausea or vomiting. No SOB or cough. D/w son Ellinger Gary - [phone]] over the phone - understands that biopsy will not change outcome, updated him about oncology input to consider placing his father on comfort measures, he is leaning towards it  And will meet the team @ 2 pm.  
 
Objective:  
 
/83 (BP 1 Location: Right arm, BP Patient Position: At rest)   Pulse 89   Temp 97 °F (36.1 °C)   Resp 22   Wt 58.6 kg (129 lb 3 oz)   SpO2 100%   BMI 18.54 kg/m² General:  Alert, cooperative currently, no acute distress   
Pulmonary:  CTA Bilaterally. No Wheezes Cardiovascular: Regular rate and Rhythm. GI:  Soft, Non distended, Non tender. + Bowel sounds. Extremities:  No pedal edema. Neurologic: motor strength 4/5 in both LEs Additional: diffuse muscle wasting and dry scaly skin Assessment/Plan: 1. 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 2. Fever could be from cancer. 3. ANN due to # 1, improving 4. Dehydration, poor oral intake 5. FTT 6. Urinary retention 7. Dementia 8. HTN 9. Severe protein calorie malnutrition Plan: 
 
Cont current management Nephrology and hematology input noted D/c IVF and monitor D/C antibiotic as blood c/s negative Family meeting later today per palliative care team 
D/W son over the phone - guarded prognosis Case discussed with:  [x]Patient  [x]Family  [x]Nursing  []Case Management DVT Prophylaxis:  []Lovenox  []Hep SQ  []SCDs  []Coumadin   []On Heparin gtt Labs:   
Recent Results (from the past 24 hour(s)) CALCIUM, IONIZED Collection Time: 01/29/19  2:48 AM  
Result Value Ref Range Ionized Calcium 1.32 1.12 - 9.97 MMOL/L  
METABOLIC PANEL, BASIC Collection Time: 01/29/19  2:48 AM  
Result Value Ref Range Sodium 138 136 - 145 mmol/L Potassium 3.6 3.5 - 5.5 mmol/L Chloride 105 100 - 108 mmol/L  
 CO2 28 21 - 32 mmol/L Anion gap 5 3.0 - 18 mmol/L Glucose 84 74 - 99 mg/dL BUN 18 7.0 - 18 MG/DL Creatinine 1.34 (H) 0.6 - 1.3 MG/DL  
 BUN/Creatinine ratio 13 12 - 20 GFR est AA >60 >60 ml/min/1.73m2 GFR est non-AA 51 (L) >60 ml/min/1.73m2 Calcium 10.1 8.5 - 10.1 MG/DL Signed By: Echo Elkins MD   
 January 29, 2019

## 2019-01-29 NOTE — PROGRESS NOTES
Problem: Dysphagia (Adult) Goal: *Acute Goals and Plan of Care (Insert Text) Patient will: 1. Tolerate PO trials with 0 s/s overt distress in 4/5 trials 2. Utilize compensatory swallow strategies/maneuvers (decrease bite/sip, size/rate, alt. liq/sol) with min cues in 4/5 trials 3. Perform oral-motor/laryngeal exercises to increase oropharyngeal swallow function with min cues 4. Complete an objective swallow study (i.e., MBSS) to assess swallow integrity, r/o aspiration, and determine of safest LRD, min A as indicated/ordered by MD  
 
Recommend:  
Mech soft, nectar thick liquids Meds in puree or with nectar thick liquids Aspiration precautions HOB >45 degrees during all intake and for at least 30 min after po Small bites/sips, slow rate of intake, alternating bites/sips Oral care post meals Outcome: Progressing Towards Goal 
Speech language pathology dysphagia treatment Patient: Uyen Dunn (56 y.o. male) Date: 1/29/2019 Diagnosis: ANN (acute kidney injury) ([facility] [address] Hypercalcemia Fever Precautions: Aspiration ASSESSMENT: 
Pt seen for follow up dysphagia tx with mech soft, NTL breakfast meal.  Pt alert but confused requiring feeding assist due to decreased initiation. Pt tolerating all items with no overt s/sx aspiration. ~15% residue remained on lingual surface post swallow requiring x2 NTL washes to clear. Pt with immediate weak cough with trials of thin liquids, not improved with single cup sips. Recommend continue current diet with use of the above mentioned compensatory strategies/aspiration precautions. Will continue to follow for further dysphagia management. Progression toward goals: 
[]         Improving appropriately and progressing toward goals [x]         Improving slowly and progressing toward goals 
[]         Not making progress toward goals and plan of care will be adjusted PLAN: 
Recommendations and Planned Interventions: Patient continues to benefit from skilled intervention to address the above impairments. Continue treatment per established plan of care. Discharge Recommendations: To Be Determined SUBJECTIVE:  
Patient stated [employer]. OBJECTIVE:  
Cognitive and Communication Status: 
Neurologic State: Agitated, Alert, Confused, Eyes open to stimulus Orientation Level: Other (Comment), Disoriented X4 Dysphagia Treatment: 
Oral Assessment: 
Oral Assessment Labial: No impairment Oral Hygiene: adequate Lingual: No impairment Velum: No impairment Mandible: No impairment P.O. Trials: 
 Patient Position: [facility] at [facility] Vocal quality prior to P.O.: Hoarse, Breathy, Low volume Consistency Presented: Nectar thick liquid, Solid, Thin liquid How Presented: Self-fed/presented, Cup/sip, Straw Bolus Acceptance: No impairment Bolus Formation/Control: No impairment Propulsion: Delayed (# of seconds) Oral Residue: Anterior, Lingual 
 Initiation of Swallow: Delayed (# of seconds) Laryngeal Elevation: Decreased Aspiration Signs/Symptoms: Change vocal quality, Weak cough Pharyngeal Phase Characteristics: Altered vocal quality, Poor endurance Effective Modifications: None Cues for Modifications: Maximal 
 Oral Phase Severity: Mild Pharyngeal Phase Severity : Moderate PAIN: 
Pt confused; unable to verbalize pain prior to or following tx After treatment:  
[]              Patient left in no apparent distress sitting up in chair 
[x]              Patient left in no apparent distress in bed 
[x]              Call bell left within reach [x]              Nursing notified 
[]              Caregiver present 
[]              Bed alarm activated COMMUNICATION/EDUCATION:  
[x]              SLP educated pt with regard to compensatory swallow strategies and 
      aspiration/reflux precautions including: small bites/sips, 
      alternate liquids/solids, decrease feeding rate, HOB > 45 with all po, and 
 upright in bed at 30 degrees after po for at least 45 
      minutes. Laina Lozoya M.S., CCC-SLP Speech-Language Pathologist

## 2019-01-29 NOTE — PROGRESS NOTES
Chart check No change in condition. Family unavailable at bed side. Attempted to call son Von Marie, no answer.

## 2019-01-29 NOTE — CONSULTS
[phone]) JACKIE LABOY [facility]: [phone] [facility]: [phone] Patient Name: Victor M Morel YOB: 1935 Date of Initial Consult: 1/28/2019, follow up 1/29/2019 Reason for Consult: care decisions Requesting Provider: Dr. Tete Oleary Primary Care Physician: Ailyn Sharp MD 
  
 SUMMARY:  
Victor M Morel is a 81y.o. year old with a past history of dementia, hypertension, chronic kidney disease, who was admitted on 1/26/2019 from home  with a diagnosis of weakness, poor appetite . In the ER he was found to have a calcium of 14.2 Current medical issues leading to Palliative Medicine involvement include: 81year old male with a history of dementia, bed bound state for over a month, now with suspicious of possible multiple myeloma. Palliative medicine is consulted for care decisions. [place] Zenydany 1/29/2019 patient seen, alert but confused. Appears comfortable. Discussed with chantelle Russ and wife at bedside, comfort measures. Hospice consult placed. PALLIATIVE DIAGNOSES:  
1. Advanced care plan discussions 2. hypercalcemia 3. Dementia 4. Debility PLAN:  
1. 1/29/2019 follow up today and participated in family meeting with chantelle Russ and patient's wife, updated on medical condition. Family has been well updated. Option of comfort measures discussed in detail, family wishes to transition to comfort measures, understanding no further lab draws no further x rays, no IVF\"s no bone marrow bx. Symptom management is now the focus. Have placed hospice consult for home. Will need additional assistance in the home. Appreciate  assistance. POST form signed for dnr/dni comfort measures, no feeding tube. Original to chart, copy to family. Family OK to start comfort measures here in the hospital.  
 
 
2. Advanced care plan discussions patient does not have an AMD on file.  He is currently not able to participate in his medical decision making. Call to his son Crow Billings, lives with him, He is  but according to Crow Billings she has been diagnosed with dementia and not able to participate in decisions. There is a brother in [place] who according to Crow Billings is not involved in his care. Plan to meet Crow Billings at bedside tomorrow at 2:00 pm to discuss care decisions. Goals of care noted discussion with attending, DNR. DDNR to be signed by son ( he is aware). 3. Hypercalcemia  Oncology on board, suspicious for multiple myeloma, continue IVF's. Plan to discuss with son. Discussed with oncology 4. Dementia bed bound for over a month, incontinent per EMS notes, oriented to himself only. Speech consult noted, nectar thick liquids 5. Debility bed bound for over a month / currently unsure of baseline will discuss with 6. Initial consult note routed to primary continuity provider 7. Communicated plan of care with: Palliative IDT, son, oncology GOALS OF CARE: 
Patient/Health Care Proxy Stated Goals: Comfort TREATMENT PREFERENCES:  
Code Status: DNR Advance Care Planning: 
[x] The [facility] Interdisciplinary Team has updated the ACP Navigator with [address] and Patient Capacity Primary Decision Maker (Health Care Agent): Crow Billings Relationship to patient: son Phone number: [phone] [] Named in a scanned document  
[x] Legal Next of Kin 
[] Guardian Secondary Decision Maker (First Alternate Health Care Agent):  
Relationship to patient: 
Phone number: 
[] Named in a scanned document  
[] Legal Next of Kin 
[] Guardian Medical Interventions: Comfort measures Artificially Administered Nutrition: No feeding tube Other: As far as possible, the palliative care team has discussed with patient / health care proxy about goals of care / treatment preferences for patient. HISTORY:  
 
History obtained from: chart CHIEF COMPLAINT: weakness HPI/SUBJECTIVE: The patient is:  
[] Verbal and participatory [x] Non-participatory due to: dementia Please see summary Clinical Pain Assessment (nonverbal scale for nonverbal patients): Clinical Pain Assessment Severity: 0 Activity (Movement): Lying quietly, normal position Duration: for how long has pt been experiencing pain (e.g., 2 days, 1 month, years) Frequency: how often pain is an issue (e.g., several times per day, once every few days, constant) FUNCTIONAL ASSESSMENT:  
 
Palliative Performance Scale (PPS): PPS: 30 
 
ECOG 
ECOG Status : Completely disabled PSYCHOSOCIAL/SPIRITUAL SCREENING:  
  
Any spiritual / Denominational concerns: unable to assess for patient  
[] Yes /  [] No 
 
Caregiver Burnout: 
[] Yes /  [] No /  [x] No Caregiver Present Anticipatory grief assessment:  Unable to assess for patient  
[] Normal  / [] Maladaptive REVIEW OF SYSTEMS:  
 
Positive and pertinent negative findings in ROS are noted above in HPI. The following systems were [] reviewed / [x] unable to be reviewed as noted in HPI Other findings are noted below. Systems: constitutional, ears/nose/mouth/throat, respiratory, gastrointestinal, genitourinary, musculoskeletal, integumentary, neurologic, psychiatric, endocrine. Positive findings noted below. Modified ESAS Completed by: provider Fatigue: 7 Pain: 0 Dyspnea: 0 PHYSICAL EXAM:  
 
Wt Readings from Last 3 Encounters:  
01/29/19 59.8 kg (131 lb 12.8 oz) 01/22/19 68 kg (150 lb)  
11/19/18 68 kg (150 lb) Blood pressure (!) 145/96, pulse 74, temperature 97.1 °F (36.2 °C), resp. rate 22, weight 59.8 kg (131 lb 12.8 oz), SpO2 100 %. Pain: 
Pain Scale 1: Visual 
Pain Intensity 1: 0 Last bowel movement: none recorded Constitutional: frail, chronically ill appearing does not participate in conversation in NAD Eyes: pupils equal, anicteric Respiratory: breathing not labored Skin: warm, dry Neurologic: alert oriented to himself, knows his son and wife, confused to place. HISTORY:  
 
Active Problems: Hypercalcemia (1/26/2019) Fever (1/26/2019) ANN (acute kidney injury) ([facility] [address] (1/26/2019) Late onset Alzheimer's disease without behavioral disturbance (1/28/2019) Debility (1/28/2019) Past Medical History:  
Diagnosis Date  CKD (chronic kidney disease) stage 2, GFR 60-89 ml/min 3/29/2010  Dementia  High blood cholesterol level 3/29/2010  
 HTN (hypertension), benign 3/29/2010  Hyperglycemia 3/29/2010  Microalbuminuria 12/14/2010  Vertigo 3/29/2010 No past surgical history on file. No family history on file. History reviewed, no pertinent family history. Social History Tobacco Use  Smoking status: Never Smoker  Smokeless tobacco: Never Used Substance Use Topics  Alcohol use: No  
 
No Known Allergies Current Facility-Administered Medications Medication Dose Route Frequency  amLODIPine (NORVASC) tablet 10 mg  10 mg Oral DAILY  hydrALAZINE (APRESOLINE) 20 mg/mL injection 10 mg  10 mg IntraVENous Q6H PRN  mineral oil-hydrophil petrolat (AQUAPHOR) ointment   Topical BID  sodium chloride (NS) flush 5-10 mL  5-10 mL IntraVENous PRN  
 aspirin tablet 325 mg  325 mg Oral DAILY  heparin (porcine) injection 5,000 Units  5,000 Units SubCUTAneous Q8H  
 
 
 LAB AND IMAGING FINDINGS:  
 
Lab Results Component Value Date/Time WBC 5.4 01/28/2019 04:11 AM  
 HGB 11.5 (L) 01/28/2019 04:11 AM  
 PLATELET 861 (L) 07/07/7658 04:11 AM  
 
Lab Results Component Value Date/Time  Sodium 138 01/29/2019 02:48 AM  
 Potassium 3.6 01/29/2019 02:48 AM  
 Chloride 105 01/29/2019 02:48 AM  
 CO2 28 01/29/2019 02:48 AM  
 BUN 18 01/29/2019 02:48 AM  
 Creatinine 1.34 (H) 01/29/2019 02:48 AM  
 Calcium 10.1 01/29/2019 02:48 AM  
 Magnesium 1.7 01/28/2019 04:11 AM  
 Phosphorus 1.8 (L) 01/28/2019 04:11 AM  
  
 Lab Results Component Value Date/Time AST (SGOT) 24 01/28/2019 04:11 AM  
 Alk. phosphatase 73 01/28/2019 04:11 AM  
 Protein, total 10.4 (H) 01/28/2019 04:11 AM  
 Albumin 2.7 (L) 01/28/2019 04:11 AM  
 Globulin 7.7 (H) 01/28/2019 04:11 AM  
 
Lab Results Component Value Date/Time INR 1.3 (H) 01/27/2019 03:02 AM  
 Prothrombin time 16.2 (H) 01/27/2019 03:02 AM  
  
Lab Results Component Value Date/Time Iron 49 (L) 11/19/2018 04:00 PM  
 TIBC 189 (L) 11/19/2018 04:00 PM  
 Iron % saturation 26 11/19/2018 04:00 PM  
  
No results found for: PH, PCO2, PO2 No components found for: Emeterio Point Lab Results Component Value Date/Time CK 71 01/27/2019 03:02 AM  
 CK - MB <1.0 01/27/2019 03:02 AM  
  
 
   
 
Total time: 65 minutes Counseling / coordination time, spent as noted above: 55 minutes 
> 50% counseling / coordination:  yes Prolonged service was provided for  [x]30 min   []75 min in face to face time in the presence of the patient, spent as noted above. Time Start: 1400 Time End:   3469 Note: this can only be billed with 34265 (initial) or 73417 (follow up). If multiple start / stop times, list each separately.

## 2019-01-29 NOTE — PROGRESS NOTES
RENAL DAILY PROGRESS NOTE 81y M with PMH HTN, admitted for weakness, seen for hyeprcalcemia, ANN Subjective:  
 
Complaint:  
Overnight events noted Looks comfortable IMPRESSION:  
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  
· HTN 
· Anemia PLAN:  
· Plan for comfort care noted ·  DC IV hydration, will be available if any question on concern. Current Facility-Administered Medications Medication Dose Route Frequency  amLODIPine (NORVASC) tablet 10 mg  10 mg Oral DAILY  hydrALAZINE (APRESOLINE) 20 mg/mL injection 10 mg  10 mg IntraVENous Q6H PRN  mineral oil-hydrophil petrolat (AQUAPHOR) ointment   Topical BID  sodium chloride (NS) flush 5-10 mL  5-10 mL IntraVENous PRN  
 aspirin tablet 325 mg  325 mg Oral DAILY  heparin (porcine) injection 5,000 Units  5,000 Units SubCUTAneous Q8H Review of Symptoms: comprehensive ROS negative except above. Objective:  
 
Patient Vitals for the past 24 hrs: 
 Temp Pulse Resp BP SpO2  
01/29/19 1205 97.1 °F (36.2 °C) 74 22 (!) 145/96 100 % 01/29/19 0837 98.7 °F (37.1 °C) 80 22 150/75 100 % 01/29/19 0508 99.1 °F (37.3 °C) 76 19 142/87   
01/29/19 0129 98.1 °F (36.7 °C) (!) 105 20 (!) 164/99 98 % 01/28/19 2139 98.3 °F (36.8 °C) 80 20 (!) 171/96 99 % 01/28/19 1624 97.1 °F (36.2 °C) 80 22 137/81 100 % Weight change: 1.184 kg (2 lb 9.8 oz) 01/27 1901 - 01/29 0700 In: 1057 [P.O.:1040; I.V.:1850] Out: 2500 [Urine:2500] Intake/Output Summary (Last 24 hours) at 1/29/2019 1327 Last data filed at 1/29/2019 1022 Gross per 24 hour Intake 1930 ml Output 1900 ml Net 30 ml Physical Exam:  
General: comfortable, no acute distress HEENT sclera anicteric, RS: + air entry b/l, Abd: Soft, Non tender, Neuro:  Dementia Extrm: no  edema, no cyanosis, clubbing Skin: no visible  Rash Musculoskeletal: No gross joints or bone deformities  
  
Procedures/imaging: see electronic medical records for all procedures, Xrays and details which were not copied into this note but were reviewed prior to creation of Plan Data Review:  
 
LABS:  
Hematology:  
Recent Labs  
  01/28/19 0411 01/27/19 0302 WBC 5.4 5.8 HGB 11.5* 11.0*  
HCT 34.5* 34.0* Chemistry:  
Recent Labs  
  01/29/19 
0248 01/28/19 0411 01/27/19 0302 01/26/19 2045 BUN 18 27* 32* 32* CREA 1.34* 1.79* 2.30* 2.47* CA 10.1 11.6* 13.8* 13.2* ALB  --  2.7*  --   --   
K 3.6 3.5 3.6 3.9  141 142 145  106 106 110* CO2 28 26 29 28 PHOS  --  1.8* 3.2  --   
GLU 84 83 95 100* Procedures/imaging: see electronic medical records for all procedures, Xrays and details which were not copied into this note but were reviewed prior to creation of Plan Assessment & Plan: As above Jessy Alejo MD 
1/29/2019 
1:27 PM

## 2019-01-29 NOTE — PROGRESS NOTES
Received report on pt.from off going RN. Resting quietly in bed on rounds. .Remains confused. an wrist restraints on. Pt will attempt tp pull at feliciano if his hands are able to reach area at all. disoriented to person, place and time. Becomes agitated if any care is given. No acute distress noted. Will cont to monitor for any changes in status. 1400 pt gets agitated easily. Attempts to scratch, hit and kick.

## 2019-01-29 NOTE — ROUTINE PROCESS
Bedside and Verbal shift change report given to James Mendoza RN (oncoming nurse) by Lianna Ferrell RN (offgoing nurse). Report given with Kayla GUERRERO and MAR.

## 2019-01-30 PROBLEM — Z51.5 COMFORT MEASURES ONLY STATUS: Status: ACTIVE | Noted: 2019-01-01

## 2019-01-30 NOTE — PROGRESS NOTES
Palliative Medicine Consult DR. GLASGOW'S [facility]: [phone]) JACKIE LABOY [facility]: [phone] [facility]: [phone] Patient Name: Ashley Pate YOB: 1935 Date of Initial Consult: 1/28/2019, follow up 1/29/2019 Reason for Consult: care decisions Requesting Provider: Dr. Adrien Berkowitz Primary Care Physician: Wilbert Hood MD 
  
 SUMMARY:  
Ashley Pate is a 81y.o. year old with a past history of dementia, hypertension, chronic kidney disease, who was admitted on 1/26/2019 from home  with a diagnosis of weakness, poor appetite . In the ER he was found to have a calcium of 14.2 Current medical issues leading to Palliative Medicine involvement include: 81year old male with a history of dementia, bed bound state for over a month, now with suspicious of possible multiple myeloma. Palliative medicine is consulted for care decisions. Minor Ronde 1/29/2019 patient seen, alert but confused. Appears comfortable. Discussed with son Crow Billings and wife at bedside, comfort measures. Hospice consult placed. 1/30/2019:  Palliative care team including Melly Mortensen RN, Micaela Newsome LCSW and myself met with patient in his room. No family members present. RN was attempting to feed patient his breakfast but he was refusing after a few bites. Patient states he is feeling pretty good today. He denies pain, nausea and shortness of breath. PALLIATIVE DIAGNOSES:  
1. Advanced care plan discussions 2. hypercalcemia 3. Dementia 4. Debility PLAN:  
 
1/30/2019: Follow up with patient today to monitor symptoms. Patient denies any pain, nausea, shortness of breath. He was trying to eat his breakfast and getting ready to have a bowel movement. Hospice has been consulted. Planning discharge to home with hospice. No change in goals of care: DNR/DNI, no feeding tubes, on comfort measures. Previous notes shown below: 1/29/2019 follow up today and participated in family meeting with son Caroline Tesfaye and patient's wife, updated on medical condition. Family has been well updated. Option of comfort measures discussed in detail, family wishes to transition to comfort measures, understanding no further lab draws no further x rays, no IVF\"s no bone marrow bx. Symptom management is now the focus. Have placed hospice consult for home. Will need additional assistance in the home. Appreciate CM assistance. POST form signed for dnr/dni comfort measures, no feeding tube. Original to chart, copy to family. Family OK to start comfort measures here in the hospital.  
 
1/28/2019 1. Advanced care plan discussions patient does not have an AMD on file. He is currently not able to participate in his medical decision making. Call to his son Caroline Tesfaye, lives with him, He is  but according to Caroline Tesfaye she has been diagnosed with dementia and not able to participate in decisions. There is a brother in [place] who according to Caroline Tesfaye is not involved in his care. Plan to meet Caroline Tesfaye at bedside tomorrow at 2:00 pm to discuss care decisions. Goals of care noted discussion with attending, DNR. DDNR to be signed by son ( he is aware). 2.  Hypercalcemia  Oncology on board, suspicious for multiple myeloma, continue IVF's. Plan to discuss with son. Discussed with oncology 3. Dementia bed bound for over a month, incontinent per EMS notes, oriented to himself only. Speech consult noted, nectar thick liquids 4. Debility bed bound for over a month / currently unsure of baseline will discuss with 5. Initial consult note routed to primary continuity provider 6. Communicated plan of care with: Palliative IDT, son, oncology GOALS OF CARE: 
Patient/Health Care Proxy Stated Goals: Comfort TREATMENT PREFERENCES:  
Code Status: DNR Advance Care Planning: 
[x] The [facility] Interdisciplinary Team has updated the ACP Navigator with Health Care Agent and Patient Capacity Primary Decision Maker (Health Care Agent): Ankur Rafat Relationship to patient: son Phone number: [phone] [] Named in a scanned document  
[x] Legal Next of Kin 
[] Guardian Secondary Decision Maker (First Alternate Health Care Agent):  
Relationship to patient: 
Phone number: 
[] Named in a scanned document  
[] Legal Next of Kin 
[] Guardian Medical Interventions: Comfort measures Artificially Administered Nutrition: No feeding tube Other: As far as possible, the palliative care team has discussed with patient / health care proxy about goals of care / treatment preferences for patient. HISTORY:  
 
History obtained from: chart CHIEF COMPLAINT: weakness HPI/SUBJECTIVE: The patient is:  
[] Verbal and participatory [x] Non-participatory due to: dementia Please see summary Clinical Pain Assessment (nonverbal scale for nonverbal patients): Clinical Pain Assessment Severity: 0 Activity (Movement): Lying quietly, normal position Duration: for how long has pt been experiencing pain (e.g., 2 days, 1 month, years) Frequency: how often pain is an issue (e.g., several times per day, once every few days, constant) FUNCTIONAL ASSESSMENT:  
 
Palliative Performance Scale (PPS): PPS: 30 
 
ECOG 
ECOG Status : Completely disabled PSYCHOSOCIAL/SPIRITUAL SCREENING:  
  
Any spiritual / Islam concerns: unable to assess for patient  
[] Yes /  [] No 
 
Caregiver Burnout: 
[] Yes /  [] No /  [x] No Caregiver Present Anticipatory grief assessment:  Unable to assess for patient  
[] Normal  / [] Maladaptive REVIEW OF SYSTEMS:  
 
Positive and pertinent negative findings in ROS are noted above in HPI. The following systems were [] reviewed / [x] unable to be reviewed as noted in HPI Other findings are noted below.  
Systems: constitutional, ears/nose/mouth/throat, respiratory, gastrointestinal, genitourinary, musculoskeletal, integumentary, neurologic, psychiatric, endocrine. Positive findings noted below. Modified ESAS Completed by: provider Fatigue: 7 Pain: 0 Dyspnea: 0 Stool Occurrence(s): 1 PHYSICAL EXAM:  
 
Wt Readings from Last 3 Encounters:  
01/30/19 59.5 kg (131 lb 1.6 oz) 01/22/19 68 kg (150 lb)  
11/19/18 68 kg (150 lb) Blood pressure (!) 160/95, pulse 68, temperature 97.5 °F (36.4 °C), resp. rate 18, weight 59.5 kg (131 lb 1.6 oz), SpO2 99 %. Pain: 
Pain Scale 1: Visual 
Pain Intensity 1: 0 Last bowel movement: none recorded Constitutional: frail, chronically ill appearing participate with brief answers to questions, in NAD Eyes: pupils equal, anicteric Respiratory: breathing not labored Skin: warm, dry Neurologic: alert oriented to himself. HISTORY:  
 
Active Problems: Hypercalcemia (1/26/2019) Fever (1/26/2019) ANN (acute kidney injury) ([facility] [address] (1/26/2019) Late onset Alzheimer's disease without behavioral disturbance (1/28/2019) Debility (1/28/2019) Past Medical History:  
Diagnosis Date  CKD (chronic kidney disease) stage 2, GFR 60-89 ml/min 3/29/2010  Dementia  High blood cholesterol level 3/29/2010  
 HTN (hypertension), benign 3/29/2010  Hyperglycemia 3/29/2010  Microalbuminuria 12/14/2010  Vertigo 3/29/2010 No past surgical history on file. No family history on file. History reviewed, no pertinent family history. Social History Tobacco Use  Smoking status: Never Smoker  Smokeless tobacco: Never Used Substance Use Topics  Alcohol use: No  
 
No Known Allergies Current Facility-Administered Medications Medication Dose Route Frequency  acetaminophen (TYLENOL) solution 650 mg  650 mg Oral Q4H PRN  
 amLODIPine (NORVASC) tablet 10 mg  10 mg Oral DAILY  hydrALAZINE (APRESOLINE) 20 mg/mL injection 10 mg  10 mg IntraVENous Q6H PRN  mineral oil-hydrophil petrolat (AQUAPHOR) ointment   Topical BID  sodium chloride (NS) flush 5-10 mL  5-10 mL IntraVENous PRN  
 aspirin tablet 325 mg  325 mg Oral DAILY  heparin (porcine) injection 5,000 Units  5,000 Units SubCUTAneous Q8H  
 
 
 LAB AND IMAGING FINDINGS:  
 
Lab Results Component Value Date/Time WBC 5.4 01/28/2019 04:11 AM  
 HGB 11.5 (L) 01/28/2019 04:11 AM  
 PLATELET 685 (L) 67/18/7147 04:11 AM  
 
Lab Results Component Value Date/Time Sodium 136 01/30/2019 03:30 AM  
 Potassium 3.0 (L) 01/30/2019 03:30 AM  
 Chloride 103 01/30/2019 03:30 AM  
 CO2 25 01/30/2019 03:30 AM  
 BUN 14 01/30/2019 03:30 AM  
 Creatinine 0.99 01/30/2019 03:30 AM  
 Calcium 9.5 01/30/2019 03:30 AM  
 Magnesium 1.7 01/28/2019 04:11 AM  
 Phosphorus 1.8 (L) 01/28/2019 04:11 AM  
  
Lab Results Component Value Date/Time AST (SGOT) 24 01/28/2019 04:11 AM  
 Alk. phosphatase 73 01/28/2019 04:11 AM  
 Protein, total 10.4 (H) 01/28/2019 04:11 AM  
 Albumin 2.7 (L) 01/28/2019 04:11 AM  
 Globulin 7.7 (H) 01/28/2019 04:11 AM  
 
Lab Results Component Value Date/Time INR 1.3 (H) 01/27/2019 03:02 AM  
 Prothrombin time 16.2 (H) 01/27/2019 03:02 AM  
  
Lab Results Component Value Date/Time Iron 49 (L) 11/19/2018 04:00 PM  
 TIBC 189 (L) 11/19/2018 04:00 PM  
 Iron % saturation 26 11/19/2018 04:00 PM  
  
No results found for: PH, PCO2, PO2 No components found for: Emeterio Point Lab Results Component Value Date/Time CK 71 01/27/2019 03:02 AM  
 CK - MB <1.0 01/27/2019 03:02 AM  
  
 
   
 
Total time: 15 minutes Counseling / coordination time, spent as noted above: 10 minutes 
> 50% counseling / coordination:  Yes with patient and RN Prolonged service was provided for  []30 min   []75 min in face to face time in the presence of the patient, spent as noted above. Time Start:  
Time End: Note: this can only be billed with 70399 (initial) or 36365 (follow up). If multiple start / stop times, list each separately.

## 2019-01-30 NOTE — PROGRESS NOTES
Received report on pt.from off going RN. Resting quietly in bed on rounds. Denies c/o pain or SOB at this time. Wrist restraints d/c'd by PM nurse. Order has . Will monitor pt closely to see if pt is puilling at tubes. No acute distress noted. Bed alarm on. Call bell at side. Will cont to monitor for any changes in status. 1000 pt has not been seen pulling at feliciano cath . Restraints remain off. Will cont to monitor. 1200 attempted to feed pt but refused to eat. Drank glucerna. Assisted onto bedpan and had a large BM. 1400 attempted to give po potassium to pt for low potassium. Pt was agitated and combative. Knocked cup of medication out of nurses hand. Dr Abhilash Hernandez notified and no other orders obtained due to pt being comfort care only. 1730 remains agitated . Minor Ronde Becomes combative when attempting to give care. Refused to eat . Drank half glass of glucerna. 183 resting in bed quietly at this time. Feliciano patent with amanda urine with darker sediment present. 193 Bedside and Verbal shift change report given to [address] (oncoming nurse) by Yusuf White RN (offgoing nurse). Report given with SBAR, Kardex and MAR.

## 2019-01-30 NOTE — PROGRESS NOTES
Per Nursing Staff CMS will not be able to review and explain Important Message from Medicare with patient at bedside, patient will not be able to acknowledge an understanding due to medical condition. A copy provided to patient left at bedside. CMS unable to obtain signature, patient unable

## 2019-01-30 NOTE — PROGRESS NOTES
NUTRITION Nursing Referral: [facility] Nutrition Consult: General Nutrition Management & Supplements RECOMMENDATIONS / PLAN:  
 
- Continue to provide nutrition interventions consistent with plan of care goals: diet for comfort as tolerated with nutritional supplements. - Continue RD inpatient monitoring and evaluation. NUTRITION INTERVENTIONS & DIAGNOSIS:  
 
[x] Meals/snacks: modify composition 
[x] Medical food supplement therapy: continue Nutrition Diagnosis: Unintended weight loss related to inadequate energy intake as evidenced by 25 lb, 16.2% weight loss x 6 months PTA per chart review. Patient meets criteria for Severe Protein Calorie Malnutrition as evidenced by:  
ASPEN Malnutrition Criteria Acute Illness, Chronic Illness, or Social/Enviornmental: Chronic illness Weight Loss: Greater than 10% x 6 mos Body Fat: Severe(upper arm & thoracic regions) Muscle Mass: Severe(clavicle, dorsal hand, patellar, quadricep regions) ASPEN Malnutrition Score - Chronic Illness: 18 Chronic Illness - Malnutrition Diagnosis: Severe malnutrition. ASSESSMENT:  
 
1/30: Transitioned to comfort measures only with no feeding tube last night. Eating only a few bites of breakfast with assistance this am. K replaced. 1/28: Pt with AMS/dementia, unable to provide answers to questions. Per H&P family reporting poor meal intake x 1 or more weeks PTA. Unplanned weight loss per chart. NFPE completed. ANN upon admission with hypercalcemia, receiving IV fluids. Ate 50% of breakfast per RN, consistency modified per SLP. Average po intake adequate to meet patients estimated nutritional needs:   [] Yes     [x] No   [] Unable to determine at this time Diet: DIET DYSPHAGIA MECH ALTERED (NDD2) 1 NECTAR; 2 GM NA (House Low NA) DIET NUTRITIONAL SUPPLEMENTS Lunch, Dinner; [employer] CUPS 
DIET NUTRITIONAL SUPPLEMENTS Breakfast, Dinner; ENSURE PUDDING Food Allergies: NKFA Current Appetite:   [] Good     [x] Fair     [x] Poor     [] Other: 
Appetite/meal intake prior to admission:   [] Good     [] Fair     [x] Poor x 1 or more weeks per H&P (per son)     [] Other:  
Feeding Limitations:  [x] Swallowing difficulty: SLP following    [] Chewing difficulty    [] Other: 
Current Meal Intake:  
Patient Vitals for the past 100 hrs: 
 % Diet Eaten 01/28/19 1748 30 % 01/28/19 1220 30 % 01/28/19 0823 50 % BM: unable to assess per chart Skin Integrity: WDL Edema:   [x] No     [] Yes Pertinent Medications: Reviewed: 80 mEq KCL Recent Labs  
  01/30/19 
0330 01/29/19 
0248 01/28/19 
0411  138 141  
K 3.0* 3.6 3.5  105 106 CO2 25 28 26 GLU 83 84 83 BUN 14 18 27* CREA 0.99 1.34* 1.79* CA 9.5 10.1 11.6*  
MG  --   --  1.7 PHOS  --   --  1.8* ALB  --   --  2.7* SGOT  --   --  24 ALT  --   --  11* Intake/Output Summary (Last 24 hours) at 1/30/2019 1257 Last data filed at 1/30/2019 1516 Gross per 24 hour Intake  Output 400 ml Net -400 ml Anthropometrics: 
Ht Readings from Last 1 Encounters:  
01/22/19 5' 10\" (1.778 m) Last 3 Recorded Weights in this Encounter 01/28/19 8633 01/29/19 1985 01/30/19 8608 Weight: 58.6 kg (129 lb 3 oz) 59.8 kg (131 lb 12.8 oz) 59.5 kg (131 lb 1.6 oz) Body mass index is 18.81 kg/m². Borderline underweight Weight History: Pt unable to provide weight hx PTA. Per chart review pt with a 25 lb, 16.2% weight loss x 6 months PTA. Weight Metrics 1/30/2019 1/22/2019 11/19/2018 10/30/2018 7/19/2018 4/16/2018 1/15/2018 Weight 131 lb 1.6 oz 150 lb 150 lb 144 lb 154 lb 159 lb 161 lb BMI 18.81 kg/m2 21.52 kg/m2 21.52 kg/m2 20.66 kg/m2 22.1 kg/m2 22.81 kg/m2 23.1 kg/m2 Admitting Diagnosis: ANN (acute kidney injury) ([facility] [address] Hypercalcemia Fever Pertinent PMHx: CKD, dementia, HTN, DM Education Needs:        [x] None identified  [] Identified - Not appropriate at this time  []  Identified and addressed - refer to education log Learning Limitations:   [] None identified  [x] Identified: dementia Cultural, Muslim & ethnic food preferences:  [x] None identified    [] Identified and addressed ESTIMATED NUTRITION NEEDS:  
 
Calories: 6982-6202 kcal (MSJx1.2-1.3) based on  [] Actual BW     [x] IBW: 76 kg Protein: 61-76 gm (0.8-1 gm/kg) based on  [] Actual BW      [x] IBW Fluid: 1 mL/kcal 
  
MONITORING & EVALUATION:  
 
Nutrition Goal(s): goals modified 1. Po intake of meals will meet >75% of patient estimated nutritional needs within the next 7 days. Outcome:  [] Met/Ongoing    [x]  Not Met    [] New/Initial Goal  
2. Provide nutrition intervention as appropriate with goals of care for the next 5-7 days. Outcome:  [] Met/Ongoing    []  Not Met    [x] New/Initial Goal   
 
Monitoring:   [x] Food and beverage intake   [x] Diet order   [x] Nutrition-focused physical findings   [x] Treatment/therapy   [] Weight   [] Enteral nutrition intake Previous Recommendations (for follow-up assessments only):     [x]   Implemented       []   Not Implemented (RD to address)      [] No Longer Appropriate     [] No Recommendation Made Discharge Planning: regular diet; consistency per SLP [x] Participated in care planning, discharge planning, & interdisciplinary rounds as appropriate Jono Day, RD Pager: [phone]

## 2019-01-30 NOTE — PROGRESS NOTES
Palliative Med team consisting of this author, Primus Closs, NP, and Romeo Cole, RN, met with Pt at bedside. The soft restraints are off,  and he had taken a few bites of breakfast with assistance from nursing and drank his milk and juice. He let us know that he needed to use the bathroom to have a BM. He also told us that he's feeling \"pretty good\" but didn't talk other than that. This author called and spoke with son, Vandana Burgess, his MPOA, earlier this am to inform him that the undersigned was leaving a form [id number] application for healthcare for the [facility] in Pt's room. Vandana Burgess needs to complete the form and return it to this author along with a copy of Pt's [phone] and photo ID for this Vince Bradley to fax to Hadley at [facility]. He acknowledged understanding. Will cont to f/u with Pt and family to provide support. Thank you for the consult and the opportunity to assist with Mr. Juarez's care. Mp Th[place], [facility] Palliative Medicine

## 2019-01-30 NOTE — PROGRESS NOTES
Problem: Impaired Skin Integrity/Pressure Injury Treatment Goal: *Prevention of pressure injury Document Chidi Scale and appropriate interventions in the flowsheet. Outcome: Progressing Towards Goal 
Pressure Injury Interventions: 
Sensory Interventions: Assess changes in LOC, Assess need for specialty bed, Avoid rigorous massage over bony prominences, Check visual cues for pain, Discuss PT/OT consult with provider, Keep linens dry and wrinkle-free, Maintain/enhance activity level, Minimize linen layers, Monitor skin under medical devices, Pad between skin to skin, Pressure redistribution bed/mattress (bed type), Turn and reposition approx. every two hours (pillows and wedges if needed), Use 30-degree side-lying position Moisture Interventions: Absorbent underpads, Apply protective barrier, creams and emollients, Assess need for specialty bed, Check for incontinence Q2 hours and as needed, Internal/External urinary devices, Limit adult briefs, Maintain skin hydration (lotion/cream), Minimize layers, Moisture barrier, Offer toileting Q_hr Activity Interventions: Assess need for specialty bed, Pressure redistribution bed/mattress(bed type) Mobility Interventions: Assess need for specialty bed, HOB 30 degrees or less, Pressure redistribution bed/mattress (bed type), Turn and reposition approx. every two hours(pillow and wedges) Nutrition Interventions: Document food/fluid/supplement intake, Discuss nutritional consult with provider, Offer support with meals,snacks and hydration Friction and Shear Interventions: Apply protective barrier, creams and emollients, Foam dressings/transparent film/skin sealants, Lift sheet, HOB 30 degrees or less, Lift team/patient mobility team, Minimize layers Problem: Falls - Risk of 
Goal: *Absence of Falls Document Rupert Hernandes Fall Risk and appropriate interventions in the flowsheet. Outcome: Progressing Towards Goal 
Fall Risk Interventions: Mobility Interventions: Assess mobility with egress test, Bed/chair exit alarm, Communicate number of staff needed for ambulation/transfer, OT consult for ADLs, Patient to call before getting OOB, PT Consult for mobility concerns, PT Consult for assist device competence, Strengthening exercises (ROM-active/passive) Mentation Interventions: Adequate sleep, hydration, pain control, Bed/chair exit alarm, Door open when patient unattended, Evaluate medications/consider consulting pharmacy, Familiar objects from home, More frequent rounding, Reorient patient, Room close to nurse's station, Toileting rounds, Update white board Medication Interventions: Assess postural VS orthostatic hypotension, Bed/chair exit alarm, Evaluate medications/consider consulting pharmacy, Patient to call before getting OOB, Teach patient to arise slowly Elimination Interventions: Bed/chair exit alarm, Call light in reach, Elevated toilet seat, Patient to call for help with toileting needs, Toilet paper/wipes in reach, Toileting schedule/hourly rounds Problem: Pressure Injury - Risk of 
Goal: *Prevention of pressure injury Document Chidi Scale and appropriate interventions in the flowsheet. Outcome: Progressing Towards Goal 
Pressure Injury Interventions: 
Sensory Interventions: Assess changes in LOC, Assess need for specialty bed, Avoid rigorous massage over bony prominences, Check visual cues for pain, Discuss PT/OT consult with provider, Keep linens dry and wrinkle-free, Maintain/enhance activity level, Minimize linen layers, Monitor skin under medical devices, Pad between skin to skin, Pressure redistribution bed/mattress (bed type), Turn and reposition approx. every two hours (pillows and wedges if needed), Use 30-degree side-lying position Moisture Interventions: Absorbent underpads, Apply protective barrier, creams and emollients, Assess need for specialty bed, Check for incontinence Q2 hours and as needed, Internal/External urinary devices, Limit adult briefs, Maintain skin hydration (lotion/cream), Minimize layers, Moisture barrier, Offer toileting Q_hr Activity Interventions: Assess need for specialty bed, Pressure redistribution bed/mattress(bed type) Mobility Interventions: Assess need for specialty bed, HOB 30 degrees or less, Pressure redistribution bed/mattress (bed type), Turn and reposition approx. every two hours(pillow and wedges) Nutrition Interventions: Document food/fluid/supplement intake, Discuss nutritional consult with provider, Offer support with meals,snacks and hydration Friction and Shear Interventions: Apply protective barrier, creams and emollients, Foam dressings/transparent film/skin sealants, Lift sheet, HOB 30 degrees or less, Lift team/patient mobility team, Minimize layers

## 2019-01-30 NOTE — ROUTINE PROCESS
Received report from [facility]. Pt AAOx1-self only, NAD, breathing non labored, on room air, HOB up. IV site clean, dry and intact. Bilateral wrist restraints on. Bed at the lowest level on lock position, call bell w/i reach. Bedside and Verbal shift change report given to JOHANNA Flores (oncoming nurse) by me (offgoing nurse). Report included the following information SBAR, Kardex, Intake/Output, MAR and Recent Results.

## 2019-01-31 NOTE — ROUTINE PROCESS
Received bedside report from [address] [place] [address], patient was resting in bed, HOB elevated, bed in lowest position and oriented to call button. Gave bedside report to Anay Gutierrez RN, using SBAR, MAR, and Kardex.

## 2019-01-31 NOTE — DISCHARGE INSTRUCTIONS
DISCHARGE SUMMARY from Nurse    PATIENT INSTRUCTIONS:    After general anesthesia or intravenous sedation, for 24 hours or while taking prescription Narcotics:  · Limit your activities  · Do not drive and operate hazardous machinery  · Do not make important personal or business decisions  · Do  not drink alcoholic beverages  · If you have not urinated within 8 hours after discharge, please contact your surgeon on call. Report the following to your surgeon:  · Excessive pain, swelling, redness or odor of or around the surgical area  · Temperature over 100.5  · Nausea and vomiting lasting longer than 4 hours or if unable to take medications  · Any signs of decreased circulation or nerve impairment to extremity: change in color, persistent  numbness, tingling, coldness or increase pain  · Any questions    What to do at Home:  Recommended activity: Activity as tolerated,     If you experience any of the following symptoms shortness of breath, pain, decreased level of consciousness, please follow up with Hospice nurse. *  Please give a list of your current medications to your Primary Care Provider. *  Please update this list whenever your medications are discontinued, doses are      changed, or new medications (including over-the-counter products) are added. *  Please carry medication information at all times in case of emergency situations. These are general instructions for a healthy lifestyle:    No smoking/ No tobacco products/ Avoid exposure to second hand smoke  Surgeon General's Warning:  Quitting smoking now greatly reduces serious risk to your health.     Obesity, smoking, and sedentary lifestyle greatly increases your risk for illness    A healthy diet, regular physical exercise & weight monitoring are important for maintaining a healthy lifestyle    You may be retaining fluid if you have a history of heart failure or if you experience any of the following symptoms:  Weight gain of 3 pounds or more overnight or 5 pounds in a week, increased swelling in our hands or feet or shortness of breath while lying flat in bed. Please call your doctor as soon as you notice any of these symptoms; do not wait until your next office visit. Recognize signs and symptoms of STROKE:    F-face looks uneven    A-arms unable to move or move unevenly    S-speech slurred or non-existent    T-time-call 911 as soon as signs and symptoms begin-DO NOT go       Back to bed or wait to see if you get better-TIME IS BRAIN. Warning Signs of HEART ATTACK     Call 911 if you have these symptoms:   Chest discomfort. Most heart attacks involve discomfort in the center of the chest that lasts more than a few minutes, or that goes away and comes back. It can feel like uncomfortable pressure, squeezing, fullness, or pain.  Discomfort in other areas of the upper body. Symptoms can include pain or discomfort in one or both arms, the back, neck, jaw, or stomach.  Shortness of breath with or without chest discomfort.  Other signs may include breaking out in a cold sweat, nausea, or lightheadedness. Don't wait more than five minutes to call 911 - MINUTES MATTER! Fast action can save your life. Calling 911 is almost always the fastest way to get lifesaving treatment. Emergency Medical Services staff can begin treatment when they arrive -- up to an hour sooner than if someone gets to the hospital by car. The discharge information has been reviewed with the copy sent home with pt per medical transport. The copy sent home verbalized understanding. Discharge medications reviewed with the copy sent home and appropriate educational materials and side effects teaching were provided. ___________________________________________________________________________________________________________________    [employer] Activation    Thank you for requesting access to [employer].  Please follow the instructions below to securely access and download your online medical record. [employer] allows you to send messages to your doctor, view your test results, renew your prescriptions, schedule appointments, and more. How Do I Sign Up? 1. In your internet browser, go to [email or web address]. 2. Click on the First Time User? Click Here link in the Sign In box. You will see the New Member Sign Up page. 3. Enter your [employer] Access Code exactly as it appears below. You will not need to use this code after youve completed the sign-up process. If you do not sign up before the expiration date, you must request a new code. [employer] Access Code: [email or web address]  Expires: 3/8/2019  2:05 PM (This is the date your [employer] access code will )    4. Enter the last four digits of your Social Security Number (xxxx) and Date of Birth (mm/dd/yyyy) as indicated and click Submit. You will be taken to the next sign-up page. 5. Create a [employer] ID. This will be your [employer] login ID and cannot be changed, so think of one that is secure and easy to remember. 6. Create a [employer] password. You can change your password at any time. 7. Enter your Password Reset Question and Answer. This can be used at a later time if you forget your password. 8. Enter your e-mail address. You will receive e-mail notification when new information is available in [address] 9. Click Sign Up. You can now view and download portions of your medical record. 10. Click the Download Summary menu link to download a portable copy of your medical information. Additional Information    If you have questions, please visit the Frequently Asked Questions section of the [employer] website at [email or web address] Remember, [employer] is NOT to be used for urgent needs. For medical emergencies, dial 911.     Patient armband removed and shredded________________

## 2019-01-31 NOTE — PROGRESS NOTES
Received report on pt.from off going RN. Resting quietly in bed on rounds. Denies c/o pain or SOB at this time. No acute distress noted. Oriented tp person. Cooperative at this time. Follows commands. feliciano patent. Bed alarm on. Call bell at side. Will cont to monitor for any changes in status. 1140 discharged home. Transported home per medical transport with feliciano catheter. Copy of discharged instructions and hard copy for 2 prescriptions, morphine and lorazepam sent in discharge packet per transport. No distress noted at time of transport.

## 2019-01-31 NOTE — PROGRESS NOTES
Per Jaylyn (VALE) called [facility] to schedule 11:00 am transport to patient's home ([address] [place] [address]) with Camellia Barthel. Informed Jaylyn of transportation arrangements.

## 2019-01-31 NOTE — DISCHARGE SUMMARY
[address] [place] [address] Dr DISCHARGE SUMMARY Name:RADHA SINGH MR#: [id number] : 1935 ACCOUNT #: [de-identified] ADMIT DATE: 2019 DISCHARGE DATE: 2019 DISPOSITION:  Discharged to home with hospice. DISCHARGE CONDITION:  Fair. DISCHARGE DIAGNOSES: 
1. Hypercalcemia secondary to #2, status post treatment. 2.  Highly suspicious of multiple myeloma as the patient had M-spike on immunofixation, IgA monoclonal protein with kappa light chain and positive skeletal survey. 3.  Low-grade fever could be from cancer. No sepsis. 4.  Acute renal failure, improving. 5.  Hypokalemia. 6.  Dehydration, improving. 7.  Failure to thrive. 8.  Dementia. 9.  Urinary retention requiring a Recinos catheter. 10.  Chronic worsening debilitating conditions. 11.  Hypertension. 12.  Severe protein calorie malnutrition. DISCHARGE MEDICATION: 
1. Ativan 0.5 mL sublingual every 4 hours as needed p.r.n. for anxiety/restlessness. 2.  Roxanol 0.25 to 0.5 mL sublingual every 12 hours p.r.n. as needed for pain and/or shortness of breath. 3.  Norvasc 5 mg daily. 4.  Meclizine 25 mg t.i.d. p.r.n. for dizziness. IMAGING AND PROCEDURES: 
1. Chest x-ray was done, reported no acute finding. 2.  CT head was reported no acute intracranial abnormality. 3.  Skeletal survey was done, showed numerous lytic foci, most pronounced in the humeri, pelvis and femurs concerning for multiple myeloma. 4.  Blood culture x2 negative. CONSULTANTS:   
1. Nephrology with Dr. Thad Gutierrez. 2.  Oncologist with Dr. Jules Rowell and group. HOSPITAL COURSE:  The patient was admitted to the hospital on 2019 with a complaint of generalized weakness and poor oral intake. Please refer to hospital admission H and P for further details. The patient was found out to have hypercalcemia with a calcium level of 14.3. Patient was seen by oncologist as Zometa was given with improvement in calcium. Patient had immunofixation done in 11/2018, which showed an M-spike as well as IgA monoclonal protein with kappa light chains. Patient also had a skeletal survey which was positive for possible multiple myeloma. Dr. Kamlesh Burch recommended a family meeting before proceeding for any further diagnosis. She spoke to patient's son who is medical power of  as the patient has underlying dementia. Palliative care was also involved. After the meeting, it was decided that there is no good quality of life and the patient is not a candidate for any further treatment even if it is cancer, so decided to keep him as comfortable as possible and hospice was involved. Patient will be transferred home today with hospice care. The patient had urinary retention requiring Recinos catheter changed every month. DISCHARGE INSTRUCTIONS: 
1. Diet:  Dysphagia mechanical altered diet. 2.  Activity:  Bed rest. 
3.  Hospice to follow this patient with Dr. Rivka Kelley. Total time greater than 35 minutes. MD JUAN RAMON Lin/[place] 
D: 01/31/2019 09:20    
T: 01/31/2019 09:58 JOB #: [id number] CC: ASHISH BEHL MD 
CC: Alhaji Yusuf MD 
CC: Marino Vargas MD 
CC: Sushil Craig MD

## 2019-01-31 NOTE — PROGRESS NOTES
[facility] Hospitalist Group Progress Note Patient: Arpita Kenney Age: 81 y.o. : 1935 MR#: [id number] SSN: [id number] Date/Time: 2019 Subjective:   
 
Patient is lying in bed in NAD. No pain currently. No nausea or vomiting. Objective:  
 
/83 (BP 1 Location: Right arm, BP Patient Position: At rest)   Pulse 89   Temp 97 °F (36.1 °C)   Resp 22   Wt 58.6 kg (129 lb 3 oz)   SpO2 100%   BMI 18.54 kg/m² General:  Alert, cooperative currently, no acute distress   
Pulmonary:  CTA Bilaterally. No Wheezes. Cardiovascular: Regular rate and Rhythm. GI:  Soft, Non distended, Non tender. + Bowel sounds. Extremities:  No pedal edema. Additional: diffuse muscle wasting and dry scaly skin Assessment/Plan: 1. Hypercalcemia likely due to highly suscipious multiple myeloma, Immunofixation shows M-Rell, IgA monoclonal protein with kappa light chain Specificity in 2018 and positive skeletal survey. 2. Fever could be from cancer. 3. ANN due to # 1, improving 4. Dehydration, poor oral intake 5. FTT 6. Urinary retention 7. Dementia 8. HTN 9. Severe protein calorie malnutrition Plan: 
 
Cont current management Discharge home with hospice today Case discussed with:  [x]Patient  []Family  [x]Nursing  [x]Case Management DVT Prophylaxis:  []Lovenox  []Hep SQ  []SCDs  []Coumadin   []On Heparin gtt Labs:   
No results found for this or any previous visit (from the past 24 hour(s)). Current Discharge Medication List  
  
START taking these medications Details LORazepam (INTENSOL) 2 mg/mL concentrated solution Take 0.5 mL by mouth every four (4) hours as needed. Max Daily Amount: 6 mg. Qty: 30 mL, Refills: 0 Associated Diagnoses: Comfort measures only status  
  
morphine (ROXANOL) 20 mg/mL concentrated oral syringe 0.25-0.5 mL by SubLINGual route every two (2) hours as needed. Max Daily Amount: 120 mg. Qty: 30 mL, Refills: 0 Associated Diagnoses: Comfort measures only status CONTINUE these medications which have NOT CHANGED Details  
amLODIPine (NORVASC) 5 mg tablet Take 1 Tab by mouth daily. Qty: 30 Tab, Refills: 5 Associated Diagnoses: Essential hypertension  
  
meclizine (ANTIVERT) 25 mg tablet Take 1 Tab by mouth three (3) times daily as needed for up to 3 doses. Qty: 9 Tab, Refills: 0 STOP taking these medications  
  
 naproxen sodium (ALEVE) 220 mg tablet Comments:  
Reason for Stopping:   
   
 aspirin (ASPIRIN) 325 mg tablet Comments:  
Reason for Stopping:   
   
 trimethoprim-sulfamethoxazole (BACTRIM DS, SEPTRA DS) 160-800 mg per tablet Comments:  
Reason for Stopping:   
   
  
 
 
Signed By: Jaycee Mendez MD   
 January 31, 2019

## 2019-01-31 NOTE — DISCHARGE SUMMARY
PATIENT DISCHARGE INSTRUCTIONS PATIENT DISCHARGE INSTRUCTIONS Blair Aragon / [id number] : 1935 Admitted 2019 Discharged: 2019 Dictated # [id number] · It is important that you take the medication exactly as they are prescribed. · Keep your medication in the bottles provided by the pharmacist and keep a list of the medication names, dosages, and times to be taken in your wallet. · Do not take other medications without consulting your doctor. What to do at [facility] Recommended Diet: Comfort feeding Recommended Activity: Bedrest 
 
 
Current Discharge Medication List  
  
START taking these medications Details LORazepam (INTENSOL) 2 mg/mL concentrated solution Take 0.5 mL by mouth every four (4) hours as needed. Max Daily Amount: 6 mg. Qty: 30 mL, Refills: 0 Associated Diagnoses: Comfort measures only status  
  
morphine (ROXANOL) 20 mg/mL concentrated oral syringe 0.25-0.5 mL by SubLINGual route every two (2) hours as needed. Max Daily Amount: 120 mg. 
Qty: 30 mL, Refills: 0 Associated Diagnoses: Comfort measures only status CONTINUE these medications which have NOT CHANGED Details  
amLODIPine (NORVASC) 5 mg tablet Take 1 Tab by mouth daily. Qty: 30 Tab, Refills: 5 Associated Diagnoses: Essential hypertension  
  
meclizine (ANTIVERT) 25 mg tablet Take 1 Tab by mouth three (3) times daily as needed for up to 3 doses. Qty: 9 Tab, Refills: 0 STOP taking these medications  
  
 naproxen sodium (ALEVE) 220 mg tablet Comments:  
Reason for Stopping:   
   
 aspirin (ASPIRIN) 325 mg tablet Comments:  
Reason for Stopping:   
   
 trimethoprim-sulfamethoxazole (BACTRIM DS, SEPTRA DS) 160-800 mg per tablet Comments:  
Reason for Stopping:   
   
  
 
 
 
 
 
Signed By: Echo Elkins MD   
 2019

## 2019-01-31 NOTE — PROGRESS NOTES
Discharge: 
 
Patient will discharge home today (1/31/2019) on hospice services [employer]). Patient's transport has been arranged through [facility] for a  time of 11AM. His equipment was delivered yesterday, to his home. There are no other care manager concerns regarding this discharge. This writer will continue to closely monitor for discharge planning until patient has officially discharged from [facility]. Jaylyn Elizabeth. MSW Care Manager Pager#: [phone]

## 2019-02-01 NOTE — PROGRESS NOTES
Hospital Discharge Follow-Up Date/Time:  2/1/2019 10:32 AM 
 
Patient was admitted to [facility] on 1/26/19 and discharged on 1/31/19 for Multiple Myeloma patient went home on hospice care. The physician discharge summary was available at the time of outreach. Nurse Navigator attempted to contact patient's son to find out how patient is doing. Message left introducing myself, the purpose of the call and giving my contact information. Requested that patient call back at his earliest convenience Current Outpatient Medications Medication Sig  
 bisacodyl (DULCOLAX, BISACODYL,) 10 mg suppository Insert 10 mg into rectum daily as needed (constipation).  LORazepam (INTENSOL) 2 mg/mL concentrated solution Take 0.5 mL by mouth every four (4) hours as needed. Max Daily Amount: 6 mg.  morphine (ROXANOL) 20 mg/mL concentrated oral syringe 0.25-0.5 mL by SubLINGual route every two (2) hours as needed. Max Daily Amount: 120 mg.  
 amLODIPine (NORVASC) 5 mg tablet Take 1 Tab by mouth daily.  meclizine (ANTIVERT) 25 mg tablet Take 1 Tab by mouth three (3) times daily as needed for up to 3 doses. (Patient taking differently: Take 1 Tab by mouth three (3) times daily as needed for Dizziness.) No current facility-administered medications for this visit. There are no discontinued medications. [facility] follow up appointment(s):  
Future Appointments Date Time Provider Chinyere Lopez 3/11/2019  7:10 AM [facility] NURSE WBVENU BARNEY  
3/18/2019  3:15 PM Hunte, Lilia Sicard, MD [facility] Non-[facility] follow up appointment(s):none [facility]:  out of service area Goals  Supportive resources in place to maintain patient in the community (ie. Home Health, DME equipment, refer to, medication assistant plan, etc.) Patient followed by home health hospice for symptom management

## 2019-02-04 NOTE — PROGRESS NOTES
Hospital Discharge Follow-Up Date/Time:  2019 10:06 AM 
 
Patient was admitted to [facility] on 19 and discharged on 19 for hypercalcemia related to multiple myeloma. The physician discharge summary was available at the time of outreach. Patient was contacted within 2 business days of discharge. Top Challenges reviewed with the provider Patient comfortable at home. Son stated he is better than when he went in the hospital but not back to normal.  Son went on to say that he did not think his father would get better though. Method of communication with provider :chart routing Inpatient RRAT score: High Risk 32 Total Score 3 Has Seen PCP in Last 6 Months (Yes=3, No=0) 28 Charlson Comorbidity Score (Age + Comorbid Conditions) Criteria that do not apply:  
 . Living with Significant Other. Assisted Living. LTAC. SNF. or  
Rehab Patient Length of Stay (>5 days = 3) IP Visits Last 12 Months (1-3=4, 4=9, >4=11) Pt. Coverage (Medicare=5 , Medicaid, or Self-Pay=4) Was this a readmission? no  
Patient stated reason for the readmission: n/a Nurse Navigator (NN) contacted the family by telephone to perform post hospital discharge assessment. Verified name and  with family as identifiers. Provided introduction to self, and explanation of the Nurse Navigator role. Reviewed discharge instructions with family who verbalized understanding. Family given an opportunity to ask questions and does not have any further questions or concerns at this time. The family agrees to contact the PCP office for questions related to their healthcare. NN provided contact information for future reference. Disease Specific:   N/A Summary of patient's top problems: 1. Multiple myeloma - patient went home on home hospice Home Health orders at discharge: home with home hospice orders [address]: [facility] Date of initial visit: 2/1/19 Barriers to care? no barriers to care Advance Care Planning:  
Does patient have an Advance Directive:  reviewed and current Medication(s):  
New Medications at Discharge: LORazepam (INTENSOL) 2 mg/mL concentrated solution Take 0.5 mL by mouth every four (4) hours as needed. Max Daily Amount: 6 mg. Qty: 30 mL, Refills: 0  
  Associated Diagnoses: Comfort measures only status  
   
morphine (ROXANOL) 20 mg/mL concentrated oral syringe 0.25-0.5 mL by SubLINGual route every two (2) hours as needed. Max Daily Amount: 120 mg. 
Qty: 30 mL, Refills: 0  
  Associated Diagnoses: Comfort measures only status Changed Medications at Discharge: none Discontinued Medications at Discharge:  
naproxen sodium (ALEVE) 220 mg tablet  
aspirin (ASPIRIN) 325 mg tablet  
trimethoprim-sulfamethoxazole (BACTRIM DS, SEPTRA DS) 160-800 mg per tablet Medication reconciliation was performed with family, who verbalizes understanding of administration of home medications. There were no barriers to obtaining medications identified at this time. Referral to Pharm D needed: no  
 
Current Outpatient Medications Medication Sig  
 bisacodyl (DULCOLAX, BISACODYL,) 10 mg suppository Insert 10 mg into rectum daily as needed (constipation).  LORazepam (INTENSOL) 2 mg/mL concentrated solution Take 0.5 mL by mouth every four (4) hours as needed. Max Daily Amount: 6 mg.  morphine (ROXANOL) 20 mg/mL concentrated oral syringe 0.25-0.5 mL by SubLINGual route every two (2) hours as needed. Max Daily Amount: 120 mg.  
 amLODIPine (NORVASC) 5 mg tablet Take 1 Tab by mouth daily.  meclizine (ANTIVERT) 25 mg tablet Take 1 Tab by mouth three (3) times daily as needed for up to 3 doses. (Patient taking differently: Take 1 Tab by mouth three (3) times daily as needed for Dizziness.) No current facility-administered medications for this visit. There are no discontinued medications. [facility] follow up appointment(s):  
Future Appointments Date Time Provider Chinyere Lopez 2/6/2019 To Be Determined Daranusha Patel,  [place] [address]  
2/11/2019 To Be Determined Daralyn Seton, CNA [address] [facility]  
2/13/2019 To Be Determined Daralyn Seton, CNA [address] [facility]  
2/18/2019 To Be Determined Daralyn Seton,  [place] [address]  
2/20/2019 To Be Determined Daralyn Seton,  [place] [address]  
2/25/2019 To Be Determined Daralyn Seton, CNA [address] [facility]  
2/27/2019 To Be Determined Daralyn Seton,  [place] [address]  
3/4/2019 To Be Determined Daralyn Seton, CNA [address] [facility]  
3/6/2019 To Be Determined Daralyn Seton, CNA [address] [facility]  
3/11/2019 To Be Determined Daralyn Seton, CNA [address] [facility]  
3/11/2019  7:10 AM [facility] NURSE [facility]LEJANDRO [facility]  
3/13/2019 To Be Determined Daralynixon Seton,  [place] [address]  
3/18/2019 To Be Determined Daralyn Seton, CNA [address] [facility]  
3/18/2019  3:15 PM Jeanine Shultz MD [address] [facility]  
3/20/2019 To Be Determined Daralyn Seton, CNA [address] [facility]  
3/25/2019 To Be Determined Daralyn Seton, CNA [address] [facility]  
3/27/2019 To Be Determined Daralyn Seton, CNA [address] [facility]  
4/1/2019 To Be Determined Daralyn Seton, CNA [address] [facility]  
4/3/2019 To Be Determined Daralyn Seton, CNA [address] [facility]  
4/8/2019 To Be Determined Daralyn Seton, CNA [address] [facility]  
4/10/2019 To Be Determined Christoph Patel,  [facility], [id number]  
4/15/2019 To Be Determined Christoph Patel,  [facility] [id number]  
4/17/2019 To Be Determined Christoph Patel, ANGELAA [address] [facility]  
4/22/2019 To Be Determined Christoph Patel, ANGELAA [address] [facility], [facility] [id number]  
4/24/2019 To Be Determined Christoph Patel, ANGELAA [address] [facility] [id number] Non-[facility] follow up appointment(s):none [facility]:  out of service area Goals  Supportive resources in place to maintain patient in the community (ie. Home Health, DME equipment, refer to, medication assistant plan, etc.) Patient followed by home health hospice for symptom management 2/4/2019 patient son reports that home hospice is coming out to see patient routinely. Next visit is 2/4/2019

## 2019-02-13 NOTE — PROGRESS NOTES
Per Chart Review:  
 
Patient is enrolled into hospice services with [facility] and Hospice. Hospice services have been initiated Follow up contact was made by Rj Colvin. No further nurse navigator follow up planned at this time as Hospice staff will be following patient's case. Episode of care closed.

## 2023-12-23 NOTE — HOSPICE
Bedside visit. The patient was able to answer a few questions. Called placed to son Anamaria Gómez to discuss [address], criteria, and IDT. Answered all questions. Anamaria Gómez wishes to pursue hospice services. Left brochure with 24/7 contact information. [facility] If we can be of further assistance please contact [phone]. PAVITHRA CondeN, RN 
Nurse Liaison, [address] [facility] [address] [place], [address] 
[phone] 
[email or web address] 

Chart review in process. Placed call to the patient's son Jenna Fonseca. No answer, left VM requesting return call with 24/7 contact information.
DME equipment ordered, scheduled delivery tonight. D/C planned for 1/31 @ 11am. Discussed with Dr. Adonay Lemons and VALE De La O.
Transportation scheduled for 11am today. Discussed with Jaylyn, Dr. Gunnar Agarwal, and the patient's son Bladimir Ames.
Patent